# Patient Record
Sex: FEMALE | HISPANIC OR LATINO | Employment: OTHER | URBAN - METROPOLITAN AREA
[De-identification: names, ages, dates, MRNs, and addresses within clinical notes are randomized per-mention and may not be internally consistent; named-entity substitution may affect disease eponyms.]

---

## 2018-04-18 ENCOUNTER — APPOINTMENT (OUTPATIENT)
Dept: LAB | Facility: CLINIC | Age: 63
End: 2018-04-18
Payer: COMMERCIAL

## 2018-04-18 ENCOUNTER — OFFICE VISIT (OUTPATIENT)
Dept: DERMATOLOGY | Facility: CLINIC | Age: 63
End: 2018-04-18
Payer: COMMERCIAL

## 2018-04-18 ENCOUNTER — TRANSCRIBE ORDERS (OUTPATIENT)
Dept: LAB | Facility: CLINIC | Age: 63
End: 2018-04-18

## 2018-04-18 DIAGNOSIS — E13.8 DIABETES MELLITUS OF OTHER TYPE WITH COMPLICATION, UNSPECIFIED WHETHER LONG TERM INSULIN USE: ICD-10-CM

## 2018-04-18 DIAGNOSIS — L30.4 INTERTRIGO: ICD-10-CM

## 2018-04-18 DIAGNOSIS — R31.9 HEMATURIA, UNSPECIFIED TYPE: ICD-10-CM

## 2018-04-18 DIAGNOSIS — R31.9 HEMATURIA, UNSPECIFIED TYPE: Primary | ICD-10-CM

## 2018-04-18 DIAGNOSIS — L65.0 TELOGEN EFFLUVIUM: Primary | ICD-10-CM

## 2018-04-18 DIAGNOSIS — Z13.89 SCREENING FOR SKIN CONDITION: ICD-10-CM

## 2018-04-18 LAB
ALBUMIN SERPL BCP-MCNC: 3.1 G/DL (ref 3.5–5)
ALP SERPL-CCNC: 79 U/L (ref 46–116)
ALT SERPL W P-5'-P-CCNC: 21 U/L (ref 12–78)
ANION GAP SERPL CALCULATED.3IONS-SCNC: 8 MMOL/L (ref 4–13)
AST SERPL W P-5'-P-CCNC: 19 U/L (ref 5–45)
BACTERIA UR QL AUTO: ABNORMAL /HPF
BILIRUB SERPL-MCNC: 0.34 MG/DL (ref 0.2–1)
BILIRUB UR QL STRIP: NEGATIVE
BUN SERPL-MCNC: 12 MG/DL (ref 5–25)
CALCIUM SERPL-MCNC: 8.8 MG/DL (ref 8.3–10.1)
CHLORIDE SERPL-SCNC: 104 MMOL/L (ref 100–108)
CHOLEST SERPL-MCNC: 91 MG/DL (ref 50–200)
CLARITY UR: CLEAR
CO2 SERPL-SCNC: 29 MMOL/L (ref 21–32)
COLOR UR: ABNORMAL
CREAT SERPL-MCNC: 0.75 MG/DL (ref 0.6–1.3)
EST. AVERAGE GLUCOSE BLD GHB EST-MCNC: 137 MG/DL
GFR SERPL CREATININE-BSD FRML MDRD: 85 ML/MIN/1.73SQ M
GLUCOSE P FAST SERPL-MCNC: 109 MG/DL (ref 65–99)
GLUCOSE UR STRIP-MCNC: NEGATIVE MG/DL
HBA1C MFR BLD: 6.4 % (ref 4.2–6.3)
HDLC SERPL-MCNC: 35 MG/DL (ref 40–60)
HGB UR QL STRIP.AUTO: NEGATIVE
HYALINE CASTS #/AREA URNS LPF: ABNORMAL /LPF
KETONES UR STRIP-MCNC: NEGATIVE MG/DL
LDLC SERPL CALC-MCNC: 25 MG/DL (ref 0–100)
LEUKOCYTE ESTERASE UR QL STRIP: NEGATIVE
NITRITE UR QL STRIP: NEGATIVE
NON-SQ EPI CELLS URNS QL MICRO: ABNORMAL /HPF
NONHDLC SERPL-MCNC: 56 MG/DL
PH UR STRIP.AUTO: 6 [PH] (ref 4.5–8)
POTASSIUM SERPL-SCNC: 3.8 MMOL/L (ref 3.5–5.3)
PROT SERPL-MCNC: 7.8 G/DL (ref 6.4–8.2)
PROT UR STRIP-MCNC: NEGATIVE MG/DL
RBC #/AREA URNS AUTO: ABNORMAL /HPF
SODIUM SERPL-SCNC: 141 MMOL/L (ref 136–145)
SP GR UR STRIP.AUTO: 1.02 (ref 1–1.03)
TRIGL SERPL-MCNC: 156 MG/DL
UROBILINOGEN UR QL STRIP.AUTO: 1 E.U./DL
WBC #/AREA URNS AUTO: ABNORMAL /HPF

## 2018-04-18 PROCEDURE — 83036 HEMOGLOBIN GLYCOSYLATED A1C: CPT

## 2018-04-18 PROCEDURE — 80053 COMPREHEN METABOLIC PANEL: CPT

## 2018-04-18 PROCEDURE — 80061 LIPID PANEL: CPT

## 2018-04-18 PROCEDURE — 36415 COLL VENOUS BLD VENIPUNCTURE: CPT

## 2018-04-18 PROCEDURE — 99213 OFFICE O/P EST LOW 20 MIN: CPT | Performed by: DERMATOLOGY

## 2018-04-18 PROCEDURE — 81001 URINALYSIS AUTO W/SCOPE: CPT | Performed by: INTERNAL MEDICINE

## 2018-04-18 RX ORDER — METOPROLOL SUCCINATE 50 MG/1
TABLET, EXTENDED RELEASE ORAL
COMMUNITY
Start: 2017-12-05

## 2018-04-18 RX ORDER — CHLORAL HYDRATE 500 MG
CAPSULE ORAL
COMMUNITY

## 2018-04-18 RX ORDER — CLOPIDOGREL BISULFATE 75 MG/1
TABLET ORAL
COMMUNITY
Start: 2017-11-21

## 2018-04-18 RX ORDER — METHOCARBAMOL 500 MG/1
500 TABLET, FILM COATED ORAL EVERY 24 HOURS
COMMUNITY
Start: 2013-07-31 | End: 2018-05-07 | Stop reason: ALTCHOICE

## 2018-04-18 RX ORDER — UBIDECARENONE 75 MG
1 CAPSULE ORAL
COMMUNITY

## 2018-04-18 RX ORDER — NITROGLYCERIN 0.4 MG/1
1 TABLET SUBLINGUAL 3 TIMES DAILY
COMMUNITY

## 2018-04-18 RX ORDER — LISINOPRIL 10 MG/1
TABLET ORAL
COMMUNITY

## 2018-04-18 RX ORDER — OMEPRAZOLE 20 MG/1
CAPSULE, DELAYED RELEASE ORAL
COMMUNITY
End: 2019-01-17 | Stop reason: SDUPTHER

## 2018-04-18 RX ORDER — MONTELUKAST SODIUM 10 MG/1
10 TABLET ORAL
COMMUNITY
Start: 2017-12-04

## 2018-04-18 RX ORDER — CLOTRIMAZOLE 1 %
CREAM (GRAM) TOPICAL
COMMUNITY
Start: 2013-07-31 | End: 2019-01-17

## 2018-04-18 RX ORDER — ALBUTEROL SULFATE 90 UG/1
AEROSOL, METERED RESPIRATORY (INHALATION)
COMMUNITY
Start: 2018-02-16 | End: 2019-05-10 | Stop reason: SDUPTHER

## 2018-04-18 RX ORDER — ATORVASTATIN CALCIUM 80 MG/1
80 TABLET, FILM COATED ORAL
COMMUNITY
Start: 2013-07-31

## 2018-04-18 RX ORDER — ALPRAZOLAM 2 MG/1
TABLET ORAL
COMMUNITY

## 2018-04-18 NOTE — PROGRESS NOTES
[address] [facility] OF [address] [facility] DERMATOLOGY  [address] [place] [address]     MRN: [id number] : 1955  Encounter: [id number]  Patient Information: Ad Barnes  Chief complaint:Hair loss and concerns regarding a rash in the groin folds    History of present illness:  58-year-old female presents for concerns regarding some loss of hair loss over the last couple weeks patient notably had the flu in January was quite sick and was hospitalized with dehydration patient noted suddenly her hair loss over the past couple weeks also concerned regarding chronic irritation in the area of her abdominal scar  No past medical history on file  No past surgical history on file  Social History   History   Alcohol use Not on file     History   Drug use: Unknown     History   Smoking Status    Former Smoker   Smokeless Tobacco    Never Used     No family history on file  Meds/Allergies   No Known Allergies    Meds:  Prior to Admission medications    Medication Sig Start Date End Date Taking?  Authorizing Provider   albuterol (PROAIR HFA) 90 mcg/act inhaler USE 2 INHALATIONS FOUR TIMES A DAY 18  Yes Historical Provider, MD   ALPRAZolam Kyree Dome) 2 MG tablet 1mg -2mg 3 times daily prn   Yes Historical Provider, MD   aspirin 81 MG tablet Take by mouth   Yes Historical Provider, MD   atorvastatin (LIPITOR) 80 mg tablet Take 80 mg by mouth 13  Yes Historical Provider, MD   clopidogrel (PLAVIX) 75 mg tablet TAKE 1 TABLET DAILY 17  Yes Historical Provider, MD   clotrimazole (LOTRIMIN) 1 % cream apply to both adominal skin folds under belly twice a day 13  Yes Historical Provider, MD   cyanocobalamin (VITAMIN B-12) 100 mcg tablet Take 1 tablet by mouth   Yes Historical Provider, MD   lisinopril (ZESTRIL) 10 mg tablet Take by mouth   Yes Historical Provider, MD   methocarbamol (ROBAXIN) 500 mg tablet Take 500 mg by mouth every 24 hours 13  Yes Historical Provider, MD   metoprolol succinate (TOPROL-XL) 50 mg 24 hr tablet TAKE 1 TABLET DAILY 12/5/17  Yes Historical Provider, MD   montelukast (SINGULAIR) 10 mg tablet Take 10 mg by mouth 12/4/17  Yes Historical Provider, MD   nitroglycerin (NITROSTAT) 0 4 mg SL tablet Take 1 tablet by mouth Three times a day   Yes Historical Provider, MD   [place]-3 1000 MG CAPS Take by mouth   Yes Historical Provider, MD   omeprazole (PriLOSEC) 20 mg delayed release capsule Take by mouth   Yes Historical Provider, MD       Subjective:     Review of Systems:    General: negative for - chills, fatigue, fever,  weight gain or weight loss  Psychological: negative for - anxiety, behavioral disorder, concentration difficulties, decreased libido, depression, irritability, memory difficulties, mood swings, sleep disturbances or suicidal ideation  ENT: negative for - hearing difficulties , nasal congestion, nasal discharge, oral lesions, sinus pain, sneezing, sore throat  Allergy and Immunology: negative for - hives, insect bite sensitivity,  Hematological and Lymphatic: negative for - bleeding problems, blood clots,bruising, swollen lymph nodes  Endocrine: negative for - hair pattern changes, hot flashes, malaise/lethargy, mood swings, palpitations, polydipsia/polyuria, skin changes, temperature intolerance or unexpected weight change  Respiratory: negative for - cough, hemoptysis, orthopnea, shortness of breath, or wheezing  Cardiovascular: negative for - chest pain, dyspnea on exertion, edema,  Gastrointestinal: negative for - abdominal pain, nausea/vomiting  Genito-Urinary: negative for - dysuria, incontinence, irregular/heavy menses or urinary frequency/urgency  Musculoskeletal: negative for - gait disturbance, joint pain, joint stiffness, joint swelling, muscle pain, muscular weakness  Dermatological:  As in HPI  Neurological: negative for confusion, dizziness, headaches, impaired coordination/balance, memory loss, numbness/tingling, seizures, speech problems, tremors or weakness       Objective: There were no vitals taken for this visit  Physical Exam:    General Appearance:    Alert, cooperative, no distress   Head:    Normocephalic, without obvious abnormality, atraumatic           Skin:   A full skin exam was performed including scalp, head scalp, eyes, ears, nose, lips, neck, chest, axilla, abdomen, back, buttocks, bilateral upper extremities, bilateral lower extremities, hands, feet, fingers, toes, fingernails, and toenails  Hair pull some hairs not numerous normal pigmented lesions regular shape and color some inflammation erosion noted on the scar on her lower abdominal fold nothing else of concern noted     Assessment:     1  Telogen effluvium     2  Intertrigo     3  Screening for skin condition           Plan:    telogen effluvium we discussed the concept of this diagnosis hopefully this is a temporary hair loss which will resolve back to normal patient may have underlying pattern alopecia as well  Patient to call if this process continues after a few more months   intertrigo continues to be a problem advise the use of petrolatum to protect the area and weight loss would be helpful  Screening for Dermatologic Disorders: Nothing else of concern noted on complete exam follow up lambert Burgos MD  4/18/2018,11:32 AM    Portions of the record may have been created with voice recognition software   Occasional wrong word or "sound a like" substitutions may have occurred due to the inherent limitations of voice recognition software   Read the chart carefully and recognize, using context, where substitutions have occurred

## 2018-04-18 NOTE — PATIENT INSTRUCTIONS
telogen effluvium we discussed the concept of this diagnosis hopefully this is a temporary hair loss which will resolve back to normal patient may have underlying pattern alopecia as well    Patient to call if this process continues after a few more months   intertrigo continues to be a problem advise the use of petrolatum to protect the area and weight loss would be helpful  Screening for Dermatologic Disorders: Nothing else of concern noted on complete exam follow up prn

## 2018-04-19 ENCOUNTER — TRANSCRIBE ORDERS (OUTPATIENT)
Dept: MRI IMAGING | Facility: CLINIC | Age: 63
End: 2018-04-19

## 2018-04-19 DIAGNOSIS — S09.90XA TRAUMATIC INJURY OF HEAD, INITIAL ENCOUNTER: Primary | ICD-10-CM

## 2018-04-20 ENCOUNTER — HOSPITAL ENCOUNTER (OUTPATIENT)
Dept: CT IMAGING | Facility: CLINIC | Age: 63
Discharge: HOME | End: 2018-04-20
Payer: COMMERCIAL

## 2018-04-20 DIAGNOSIS — S09.90XA TRAUMATIC INJURY OF HEAD, INITIAL ENCOUNTER: ICD-10-CM

## 2018-04-20 PROCEDURE — 70450 CT HEAD/BRAIN W/O DYE: CPT

## 2018-05-07 ENCOUNTER — OFFICE VISIT (OUTPATIENT)
Dept: OBGYN CLINIC | Age: 63
End: 2018-05-07
Payer: COMMERCIAL

## 2018-05-07 VITALS
WEIGHT: 234 LBS | SYSTOLIC BLOOD PRESSURE: 152 MMHG | DIASTOLIC BLOOD PRESSURE: 72 MMHG | BODY MASS INDEX: 47.17 KG/M2 | HEIGHT: 59 IN

## 2018-05-07 DIAGNOSIS — R10.2 PELVIC PAIN: ICD-10-CM

## 2018-05-07 DIAGNOSIS — L30.4 INTERTRIGO: ICD-10-CM

## 2018-05-07 DIAGNOSIS — Z01.411 ENCOUNTER FOR GYNECOLOGICAL EXAMINATION WITH ABNORMAL FINDING: Primary | ICD-10-CM

## 2018-05-07 DIAGNOSIS — L30.9 DERMATITIS: ICD-10-CM

## 2018-05-07 DIAGNOSIS — Z12.31 ENCOUNTER FOR SCREENING MAMMOGRAM FOR MALIGNANT NEOPLASM OF BREAST: ICD-10-CM

## 2018-05-07 DIAGNOSIS — R31.9 HEMATURIA, UNSPECIFIED TYPE: ICD-10-CM

## 2018-05-07 PROCEDURE — G0101 CA SCREEN;PELVIC/BREAST EXAM: HCPCS | Performed by: NURSE PRACTITIONER

## 2018-05-07 PROCEDURE — G0145 SCR C/V CYTO,THINLAYER,RESCR: HCPCS | Performed by: NURSE PRACTITIONER

## 2018-05-07 RX ORDER — ZOLPIDEM TARTRATE 10 MG/1
10 TABLET ORAL DAILY
COMMUNITY
Start: 2013-07-31

## 2018-05-07 RX ORDER — CLOTRIMAZOLE AND BETAMETHASONE DIPROPIONATE 10; .64 MG/G; MG/G
CREAM TOPICAL 2 TIMES DAILY
Qty: 45 G | Refills: 1 | Status: SHIPPED | OUTPATIENT
Start: 2018-05-07 | End: 2019-01-17

## 2018-05-07 RX ORDER — TRAMADOL HYDROCHLORIDE 50 MG/1
50 TABLET ORAL
COMMUNITY
Start: 2013-07-31

## 2018-05-07 NOTE — PROGRESS NOTES
Assessment/Plan:    No problem-specific Assessment & Plan notes found for this encounter  Diagnoses and all orders for this visit:    Encounter for gynecological examination with abnormal finding  -     Liquid-based pap, screening    Pelvic pain  -     US pelvis complete w transvaginal; Future  -     conjugated estrogens (PREMARIN) vaginal cream; Insert 0 5 g into the vagina 2 (two) times a week for 30 days    Intertrigo  -     clotrimazole-betamethasone (LOTRISONE) 1-0 05 % cream; Apply topically 2 (two) times a day    Hematuria, unspecified type  -     Ambulatory referral to Urology    Dermatitis    Encounter for screening mammogram for malignant neoplasm of breast  -     Mammo screening bilateral w 3d & cad; Future    Other orders  -     traMADol (ULTRAM) 50 mg tablet; Take 50 mg by mouth  -     zolpidem (AMBIEN) 10 mg tablet; Take 10 mg by mouth daily        Call as needed, encouraged calcium/vit D in her diet, all questions answered  Return in 6 weeks for re-evaluation and possible vulvar bx if no symptom relief  Subjective:      Patient ID: Jerrod Castillo is a 57 y o  female  Pleasant 57 y o  postmenopausal female here for annual exam  She denies postmenopausal bleeding, Hysterectomy/oopherectomy for ovarian cysts in 1980s  + history of abnormal pap smears, last one abnml in 2017  +vaginal issues with itching and discomfort on her vulva  + pelvic pain  Denies menopausal/postmenopausal issues  Colonoscopy 2017  The following portions of the patient's history were reviewed and updated as appropriate:   She  has a past medical history of Arthritis ( ); Diabetes ([address] ); and High blood pressure    She   Patient Active Problem List    Diagnosis Date Noted    Encounter for gynecological examination with abnormal finding 05/07/2018    Dermatitis 05/07/2018    Hematuria 05/07/2018    Pelvic pain 05/07/2018    Telogen effluvium 04/18/2018    Intertrigo 04/18/2018    Screening for skin condition 04/18/2018     She  has a past surgical history that includes Other surgical history; Coronary stent placement; Gallbladder surgery; Hysterectomy; and Knee surgery (Right)  Her family history includes Breast cancer in her maternal aunt, maternal aunt, mother, and paternal aunt; Cervical cancer in her maternal aunt; Colon cancer in her father; Ovarian cancer in her maternal aunt; Uterine cancer in her maternal aunt  She  reports that she has quit smoking  She has never used smokeless tobacco  She reports that she does not drink alcohol or use drugs  Current Outpatient Prescriptions   Medication Sig Dispense Refill    albuterol (PROAIR HFA) 90 mcg/act inhaler USE 2 INHALATIONS FOUR TIMES A DAY      ALPRAZolam (XANAX) 2 MG tablet 1mg -2mg 3 times daily prn      aspirin 81 MG tablet Take by mouth      atorvastatin (LIPITOR) 80 mg tablet Take 80 mg by mouth      clopidogrel (PLAVIX) 75 mg tablet TAKE 1 TABLET DAILY      cyanocobalamin (VITAMIN B-12) 100 mcg tablet Take 1 tablet by mouth      lisinopril (ZESTRIL) 10 mg tablet Take by mouth      metoprolol succinate (TOPROL-XL) 50 mg 24 hr tablet TAKE 1 TABLET DAILY      montelukast (SINGULAIR) 10 mg tablet Take 10 mg by mouth      nitroglycerin (NITROSTAT) 0 4 mg SL tablet Take 1 tablet by mouth Three times a day      [place]-3 1000 MG CAPS Take by mouth      omeprazole (PriLOSEC) 20 mg delayed release capsule Take by mouth      zolpidem (AMBIEN) 10 mg tablet Take 10 mg by mouth daily      clotrimazole (LOTRIMIN) 1 % cream apply to both adominal skin folds under belly twice a day      clotrimazole-betamethasone (LOTRISONE) 1-0 05 % cream Apply topically 2 (two) times a day 45 g 1    conjugated estrogens (PREMARIN) vaginal cream Insert 0 5 g into the vagina 2 (two) times a week for 30 days 42 5 g 0    traMADol (ULTRAM) 50 mg tablet Take 50 mg by mouth       No current facility-administered medications for this visit        She is allergic to wound dressing adhesive  OB History    Para Term  AB Living   2 2 2     2   SAB TAB Ectopic Multiple Live Births           2      # Outcome Date GA Lbr Simeon/2nd Weight Sex Delivery Anes PTL Lv   2 Term            1 Term                 Grown daughter and son  Review of Systems   Constitutional: Negative for chills, fatigue, fever and unexpected weight change  Respiratory: Negative for shortness of breath  Gastrointestinal: Negative for anal bleeding, blood in stool, constipation and diarrhea  Genitourinary: Negative for difficulty urinating, dysuria and hematuria  Objective:      /72 (BP Location: Right arm, Patient Position: Sitting)   Ht 4' 11" (1 499 m)   Wt 106 kg (234 lb)   Breastfeeding? No   BMI 47 26 kg/m²          Physical Exam   Constitutional: She appears well-developed and well-nourished  No distress  HENT:   Head: Normocephalic  Neck: Normal range of motion  Neck supple  Pulmonary/Chest: Effort normal  Right breast exhibits no inverted nipple, no mass, no nipple discharge, no skin change and no tenderness  Left breast exhibits no inverted nipple, no mass, no nipple discharge, no skin change and no tenderness  Breasts are symmetrical    Abdominal: Soft  Genitourinary: No breast swelling, tenderness, discharge or bleeding  Pelvic exam was performed with patient supine  There is labial fusion  There is no rash, tenderness, lesion or injury on the right labia  There is no rash, tenderness, lesion or injury on the left labia  Right adnexum displays no mass, no tenderness and no fullness  Left adnexum displays no mass, no tenderness and no fullness  No erythema or tenderness in the vagina  No foreign body in the vagina  No signs of injury around the vagina  No vaginal discharge found  Genitourinary Comments: Cx/uterus absent, +leukoplakia with fusion noted bilaterally  Erythema/atrophy noted to vagina  Pannus with irritation midabdomen  Also pruritic as well  Lymphadenopathy:        Right: No inguinal adenopathy present  Left: No inguinal adenopathy present

## 2018-05-07 NOTE — PATIENT INSTRUCTIONS
Dermatitis   AMBULATORY CARE:   Dermatitis  is skin inflammation  You may have an itchy rash, redness, or swelling  You may also have bumps or blisters that crust over or ooze clear fluid  Call 911 if you have any of the following symptoms of anaphylaxis:   · Sudden trouble breathing    · Throat swelling and tightness    · Dizziness, lightheadedness, fainting, or confusion  Seek care immediately if:   · You develop a fever or have red streaks going up your arm or leg  · Your rash gets more swollen, red, or hot  Contact your healthcare provider if:   · Your skin blisters, oozes white or yellow pus, or has a foul-smelling discharge  · Your rash spreads or does not get better, even after treatment  · You have questions or concerns about your condition or care  Treatment for dermatitis  depends on the cause of your rash  You may need medicines to help decrease itching and inflammation or treat a bacterial infection  They may be given as a topical cream, shot, or a pill  Manage dermatitis:   · Apply a cool compress to your rash  This will help soothe your skin  · Keep your skin moist   Rub unscented cream or lotion on your skin to prevent dryness and itching  Do this right after a lukewarm bath or shower when your skin is still damp  · Avoid skin irritants  Do not use skin irritants, such as makeup, hair products, soaps, and cleansers  Use products that do not contain fragrances or dye  Follow up with your healthcare provider as directed:  Write down your questions so you remember to ask them during your visits  © 2017 [employer] Information is for End User's use only and may not be sold, redistributed or otherwise used for commercial purposes  All illustrations and images included in CareNotes® are the copyrighted property of [employer] , [employer]  or [employer]  The above information is an  only   It is not intended as medical advice for individual conditions or treatments  Talk to your doctor, nurse or pharmacist before following any medical regimen to see if it is safe and effective for you

## 2018-05-08 ENCOUNTER — TELEPHONE (OUTPATIENT)
Dept: OBGYN CLINIC | Facility: CLINIC | Age: 63
End: 2018-05-08

## 2018-05-09 DIAGNOSIS — R10.2 PELVIC PAIN: Primary | ICD-10-CM

## 2018-05-10 LAB
LAB AP GYN PRIMARY INTERPRETATION: NORMAL
Lab: NORMAL

## 2018-05-15 ENCOUNTER — OFFICE VISIT (OUTPATIENT)
Dept: UROLOGY | Facility: CLINIC | Age: 63
End: 2018-05-15
Payer: COMMERCIAL

## 2018-05-15 VITALS
HEIGHT: 59 IN | WEIGHT: 237 LBS | BODY MASS INDEX: 47.78 KG/M2 | SYSTOLIC BLOOD PRESSURE: 138 MMHG | HEART RATE: 82 BPM | DIASTOLIC BLOOD PRESSURE: 80 MMHG

## 2018-05-15 DIAGNOSIS — R31.21 ASYMPTOMATIC MICROSCOPIC HEMATURIA: ICD-10-CM

## 2018-05-15 DIAGNOSIS — R31.29 MICROHEMATURIA: Primary | ICD-10-CM

## 2018-05-15 LAB
SL AMB  POCT GLUCOSE, UA: NORMAL
SL AMB LEUKOCYTE ESTERASE,UA: NORMAL
SL AMB POCT BILIRUBIN,UA: NORMAL
SL AMB POCT BLOOD,UA: NORMAL
SL AMB POCT CLARITY,UA: CLEAR
SL AMB POCT COLOR,UA: YELLOW
SL AMB POCT KETONES,UA: NORMAL
SL AMB POCT NITRITE,UA: NORMAL
SL AMB POCT PH,UA: 6
SL AMB POCT SPECIFIC GRAVITY,UA: 1.01
SL AMB POCT URINE PROTEIN: NORMAL
SL AMB POCT UROBILINOGEN: NORMAL

## 2018-05-15 PROCEDURE — 81002 URINALYSIS NONAUTO W/O SCOPE: CPT | Performed by: UROLOGY

## 2018-05-15 PROCEDURE — 99203 OFFICE O/P NEW LOW 30 MIN: CPT | Performed by: UROLOGY

## 2018-05-15 NOTE — LETTER
May 15, 2018     [address] [place] [address]  [address] [facility]    Patient: Amy Wagner   YOB: 1955   Date of Visit: 5/15/2018       Dear Dr Chel Huber: Thank you for referring Amy Wagner to me for evaluation  Below are my notes for this consultation  If you have questions, please do not hesitate to call me  I look forward to following your patient along with you  Sincerely,        Archana Donovan MD        CC: No Recipients  Archana Donovan MD  5/15/2018  2:00 PM  Sign at close encounter  Referring Physician: Rafael Akins MD  A copy of this note was sent to the referring physician  Diagnoses and all orders for this visit:    Microhematuria  -     POCT urine dip    Asymptomatic microscopic hematuria            Assessment and plan: This is a 64-year-old female with a reported history of microscopic hematuria  I reviewed review with the patient the results of her recent urinalysis with microscopy which reveals 0 red blood cells per high-powered field  In addition urine dipstick in the office today was negative  As such discussed that no further urologic evaluation is necessary  Patient was encouraged to follow-up with her gynecologist for her atrophic vaginitis    Archana Donovan MD      Chief Complaint     Hematuria      History of Present Illness     Amy Wagner is a 57 y o  female referred for evaluation of possible microscopic hematuria  Patient reports a history of chronic constipation  She notes that often when she strains to defecate he sees blood with her stool  She denies any gross hematuria  She recently had a microscopic urinalysis obtained during a gynecologic evaluation  This was negative for any hematuria  Detailed Urologic History     - please refer to HPI    Review of Systems     Review of Systems   Constitutional: Positive for activity change  Negative for fatigue     HENT: Negative for congestion  Eyes: Negative for visual disturbance  Respiratory: Negative for shortness of breath and wheezing  Cardiovascular: Negative for chest pain and leg swelling  Gastrointestinal: Negative for abdominal pain  Endocrine: Negative for polyuria  Genitourinary: Negative for dysuria, flank pain, hematuria and urgency  Musculoskeletal: Positive for back pain  Allergic/Immunologic: Negative for immunocompromised state  Neurological: Negative for dizziness and numbness  Psychiatric/Behavioral: Negative for dysphoric mood  All other systems reviewed and are negative  Allergies     Allergies   Allergen Reactions    Wound Dressing Adhesive      Tape        Physical Exam     Physical Exam   Constitutional: She is oriented to person, place, and time  She appears well-developed  HENT:   Head: Normocephalic and atraumatic  Eyes: Pupils are equal, round, and reactive to light  Neck: Normal range of motion  Cardiovascular:   Negative lower extremity edema   Pulmonary/Chest: Effort normal and breath sounds normal    Abdominal: Soft  Genitourinary:   Genitourinary Comments: Negative suprapubic tenderness   Musculoskeletal: Normal range of motion  Neurological: She is alert and oriented to person, place, and time  Skin: Skin is warm  Psychiatric: She has a normal mood and affect             Vital Signs  Vitals:    05/15/18 1311   BP: 138/80   Pulse: 82   Weight: 108 kg (237 lb)   Height: 4' 11" (1 499 m)         Current Medications       Current Outpatient Prescriptions:     albuterol (PROAIR HFA) 90 mcg/act inhaler, USE 2 INHALATIONS FOUR TIMES A DAY, Disp: , Rfl:     ALPRAZolam (XANAX) 2 MG tablet, 1mg -2mg 3 times daily prn, Disp: , Rfl:     aspirin 81 MG tablet, Take by mouth, Disp: , Rfl:     atorvastatin (LIPITOR) 80 mg tablet, Take 80 mg by mouth, Disp: , Rfl:     clopidogrel (PLAVIX) 75 mg tablet, TAKE 1 TABLET DAILY, Disp: , Rfl:     cyanocobalamin (VITAMIN B-12) 100 mcg tablet, Take 1 tablet by mouth, Disp: , Rfl:     lisinopril (ZESTRIL) 10 mg tablet, Take by mouth, Disp: , Rfl:     metoprolol succinate (TOPROL-XL) 50 mg 24 hr tablet, TAKE 1 TABLET DAILY, Disp: , Rfl:     montelukast (SINGULAIR) 10 mg tablet, Take 10 mg by mouth, Disp: , Rfl:     nitroglycerin (NITROSTAT) 0 4 mg SL tablet, Take 1 tablet by mouth Three times a day, Disp: , Rfl:     Omega-3 1000 MG CAPS, Take by mouth, Disp: , Rfl:     traMADol (ULTRAM) 50 mg tablet, Take 50 mg by mouth, Disp: , Rfl:     zolpidem (AMBIEN) 10 mg tablet, Take 10 mg by mouth daily, Disp: , Rfl:     clotrimazole (LOTRIMIN) 1 % cream, apply to both adominal skin folds under belly twice a day, Disp: , Rfl:     clotrimazole-betamethasone (LOTRISONE) 1-0 05 % cream, Apply topically 2 (two) times a day, Disp: 45 g, Rfl: 1    conjugated estrogens (PREMARIN) vaginal cream, Insert 0 5 g into the vagina 2 (two) times a week for 30 days, Disp: 42 5 g, Rfl: 0    omeprazole (PriLOSEC) 20 mg delayed release capsule, Take by mouth, Disp: , Rfl:       Active Problems     Patient Active Problem List   Diagnosis    Telogen effluvium    Intertrigo    Screening for skin condition    Encounter for gynecological examination with abnormal finding    Dermatitis    Hematuria    Pelvic pain         Past Medical History     Past Medical History:   Diagnosis Date    Angina at rest [facility])     Arthritis       Back problem     Diabetes ([facility] [address] )     Gall stones     Heart attack ([facility] [address] )     High blood pressure     Stomach problems          Surgical History     Past Surgical History:   Procedure Laterality Date    CORONARY STENT PLACEMENT      x3    GALLBLADDER SURGERY      HYSTERECTOMY      KNEE SURGERY Right     OTHER SURGICAL HISTORY      Double Bypass          Family History     Family History   Problem Relation Age of Onset    Breast cancer Mother     Colon cancer Father     Breast cancer Maternal Aunt     Ovarian cancer Maternal Aunt     Breast cancer Maternal Aunt     Cervical cancer Maternal Aunt     Uterine cancer Maternal Aunt     Breast cancer Paternal Aunt          Social History     Social History     History   Smoking Status    Former Smoker   Smokeless Tobacco    Never Used         Pertinent Lab Values     Lab Results   Component Value Date    CREATININE 0 75 04/18/2018       No results found for: PSA    @RESULTRCNT(1H])@      Pertinent Imaging      - n/a    Portions of the record may have been created with voice recognition software   Occasional wrong word or "sound a like" substitutions may have occurred due to the inherent limitations of voice recognition software   Read the chart carefully and recognize, using context, where substitutions have occurred

## 2018-05-15 NOTE — PROGRESS NOTES
Referring Physician: Milton Adams MD  A copy of this note was sent to the referring physician  Diagnoses and all orders for this visit:    Microhematuria  -     POCT urine dip    Asymptomatic microscopic hematuria            Assessment and plan: This is a 12-year-old female with a reported history of microscopic hematuria  I reviewed review with the patient the results of her recent urinalysis with microscopy which reveals 0 red blood cells per high-powered field  In addition urine dipstick in the office today was negative  As such discussed that no further urologic evaluation is necessary  Patient was encouraged to follow-up with her gynecologist for her atrophic vaginitis    Victor Hugo Guzman MD      Chief Complaint     Hematuria      History of Present Illness     Tacho Tristan is a 57 y o  female referred for evaluation of possible microscopic hematuria  Patient reports a history of chronic constipation  She notes that often when she strains to defecate he sees blood with her stool  She denies any gross hematuria  She recently had a microscopic urinalysis obtained during a gynecologic evaluation  This was negative for any hematuria  Detailed Urologic History     - please refer to HPI    Review of Systems     Review of Systems   Constitutional: Positive for activity change  Negative for fatigue  HENT: Negative for congestion  Eyes: Negative for visual disturbance  Respiratory: Negative for shortness of breath and wheezing  Cardiovascular: Negative for chest pain and leg swelling  Gastrointestinal: Negative for abdominal pain  Endocrine: Negative for polyuria  Genitourinary: Negative for dysuria, flank pain, hematuria and urgency  Musculoskeletal: Positive for back pain  Allergic/Immunologic: Negative for immunocompromised state  Neurological: Negative for dizziness and numbness  Psychiatric/Behavioral: Negative for dysphoric mood     All other systems reviewed and are negative  Allergies     Allergies   Allergen Reactions    Wound Dressing Adhesive      Tape        Physical Exam     Physical Exam   Constitutional: She is oriented to person, place, and time  She appears well-developed  HENT:   Head: Normocephalic and atraumatic  Eyes: Pupils are equal, round, and reactive to light  Neck: Normal range of motion  Cardiovascular:   Negative lower extremity edema   Pulmonary/Chest: Effort normal and breath sounds normal    Abdominal: Soft  Genitourinary:   Genitourinary Comments: Negative suprapubic tenderness   Musculoskeletal: Normal range of motion  Neurological: She is alert and oriented to person, place, and time  Skin: Skin is warm  Psychiatric: She has a normal mood and affect             Vital Signs  Vitals:    05/15/18 1311   BP: 138/80   Pulse: 82   Weight: 108 kg (237 lb)   Height: 4' 11" (1 499 m)         Current Medications       Current Outpatient Prescriptions:     albuterol (PROAIR HFA) 90 mcg/act inhaler, USE 2 INHALATIONS FOUR TIMES A DAY, Disp: , Rfl:     ALPRAZolam (XANAX) 2 MG tablet, 1mg -2mg 3 times daily prn, Disp: , Rfl:     aspirin 81 MG tablet, Take by mouth, Disp: , Rfl:     atorvastatin (LIPITOR) 80 mg tablet, Take 80 mg by mouth, Disp: , Rfl:     clopidogrel (PLAVIX) 75 mg tablet, TAKE 1 TABLET DAILY, Disp: , Rfl:     cyanocobalamin (VITAMIN B-12) 100 mcg tablet, Take 1 tablet by mouth, Disp: , Rfl:     lisinopril (ZESTRIL) 10 mg tablet, Take by mouth, Disp: , Rfl:     metoprolol succinate (TOPROL-XL) 50 mg 24 hr tablet, TAKE 1 TABLET DAILY, Disp: , Rfl:     montelukast (SINGULAIR) 10 mg tablet, Take 10 mg by mouth, Disp: , Rfl:     nitroglycerin (NITROSTAT) 0 4 mg SL tablet, Take 1 tablet by mouth Three times a day, Disp: , Rfl:     Omega-3 1000 MG CAPS, Take by mouth, Disp: , Rfl:     traMADol (ULTRAM) 50 mg tablet, Take 50 mg by mouth, Disp: , Rfl:     zolpidem (AMBIEN) 10 mg tablet, Take 10 mg by mouth daily, Disp: , Rfl:     clotrimazole (LOTRIMIN) 1 % cream, apply to both adominal skin folds under belly twice a day, Disp: , Rfl:     clotrimazole-betamethasone (LOTRISONE) 1-0 05 % cream, Apply topically 2 (two) times a day, Disp: 45 g, Rfl: 1    conjugated estrogens (PREMARIN) vaginal cream, Insert 0 5 g into the vagina 2 (two) times a week for 30 days, Disp: 42 5 g, Rfl: 0    omeprazole (PriLOSEC) 20 mg delayed release capsule, Take by mouth, Disp: , Rfl:       Active Problems     Patient Active Problem List   Diagnosis    Telogen effluvium    Intertrigo    Screening for skin condition    Encounter for gynecological examination with abnormal finding    Dermatitis    Hematuria    Pelvic pain         Past Medical History     Past Medical History:   Diagnosis Date    [facility])     Arthritis       Back problem     [facility]     Gall stones     [facility]     High blood pressure     Stomach problems          Surgical History     Past Surgical History:   Procedure Laterality Date    CORONARY STENT PLACEMENT      x3    GALLBLADDER SURGERY      HYSTERECTOMY      KNEE SURGERY Right     OTHER SURGICAL HISTORY      Double Bypass          Family History     Family History   Problem Relation Age of Onset   Susy Mayes Breast cancer Mother     Colon cancer Father     Breast cancer Maternal Aunt     Ovarian cancer Maternal Aunt     Breast cancer Maternal Aunt     Cervical cancer Maternal Aunt     Uterine cancer Maternal Aunt     Breast cancer Paternal Aunt          Social History     Social History     History   Smoking Status    Former Smoker   Smokeless Tobacco    Never Used         Pertinent Lab Values     Lab Results   Component Value Date    CREATININE 0 75 04/18/2018       No results found for: PSA    @RESULTRCNT(1H])@      Pertinent Imaging      - n/a    Portions of the record may have been created with voice recognition software   Occasional wrong word or "sound a like" substitutions may have occurred due to the inherent limitations of voice recognition software   Read the chart carefully and recognize, using context, where substitutions have occurred

## 2018-06-18 ENCOUNTER — TELEPHONE (OUTPATIENT)
Dept: OBGYN CLINIC | Age: 63
End: 2018-06-18

## 2018-06-18 NOTE — TELEPHONE ENCOUNTER
Pt was scheduled to see you this morning for a f/u medication (which she never picked up) + test results  Pt would like to know if you can call her regarding this  Please advise

## 2018-06-18 NOTE — TELEPHONE ENCOUNTER
Spoke to pt  Vaginal itching is "once in a blue moon"  She will use lotrisone if it returns bc she could not afford the vajinal premarin  Has appt for pelvic u/s in July to evaluate her remaining ovary (hx of partial hysterectomy)  No further concerns  Will return after her appts to follow up with me

## 2018-07-27 ENCOUNTER — HOSPITAL ENCOUNTER (OUTPATIENT)
Dept: MAMMOGRAPHY | Facility: CLINIC | Age: 63
Discharge: HOME | End: 2018-07-27
Payer: COMMERCIAL

## 2018-07-27 ENCOUNTER — HOSPITAL ENCOUNTER (OUTPATIENT)
Dept: ULTRASOUND IMAGING | Facility: HOSPITAL | Age: 63
Discharge: HOME | End: 2018-07-27
Payer: COMMERCIAL

## 2018-07-27 DIAGNOSIS — Z12.31 ENCOUNTER FOR SCREENING MAMMOGRAM FOR MALIGNANT NEOPLASM OF BREAST: ICD-10-CM

## 2018-07-27 DIAGNOSIS — R10.2 PELVIC PAIN: ICD-10-CM

## 2018-07-27 PROCEDURE — 76830 TRANSVAGINAL US NON-OB: CPT

## 2018-07-27 PROCEDURE — 76856 US EXAM PELVIC COMPLETE: CPT

## 2018-07-27 PROCEDURE — 77067 SCR MAMMO BI INCL CAD: CPT

## 2018-07-27 PROCEDURE — 77063 BREAST TOMOSYNTHESIS BI: CPT

## 2018-07-31 ENCOUNTER — TELEPHONE (OUTPATIENT)
Dept: OBGYN CLINIC | Facility: CLINIC | Age: 63
End: 2018-07-31

## 2018-07-31 NOTE — TELEPHONE ENCOUNTER
----- Message from Huseyin Ramos, [address] sent at 7/30/2018 10:16 AM EDT -----  Pls advise pt that her ovaries were not seen which is not uncommon in a postmenopausal women bc they can shrink but no MASSES were seen so I don't feel any follow up imaging is necessary  Thanks

## 2018-11-29 ENCOUNTER — APPOINTMENT (OUTPATIENT)
Dept: LAB | Facility: CLINIC | Age: 63
End: 2018-11-29
Payer: COMMERCIAL

## 2018-11-29 ENCOUNTER — TRANSCRIBE ORDERS (OUTPATIENT)
Dept: LAB | Facility: CLINIC | Age: 63
End: 2018-11-29

## 2018-11-29 DIAGNOSIS — E13.8 DIABETES MELLITUS OF OTHER TYPE WITH COMPLICATION, UNSPECIFIED WHETHER LONG TERM INSULIN USE: Primary | ICD-10-CM

## 2018-11-29 DIAGNOSIS — E13.8 DIABETES MELLITUS OF OTHER TYPE WITH COMPLICATION, UNSPECIFIED WHETHER LONG TERM INSULIN USE: ICD-10-CM

## 2018-11-29 LAB
ALBUMIN SERPL BCP-MCNC: 3.4 G/DL (ref 3.5–5)
ALP SERPL-CCNC: 88 U/L (ref 46–116)
ALT SERPL W P-5'-P-CCNC: 24 U/L (ref 12–78)
ANION GAP SERPL CALCULATED.3IONS-SCNC: 3 MMOL/L (ref 4–13)
AST SERPL W P-5'-P-CCNC: 18 U/L (ref 5–45)
BILIRUB SERPL-MCNC: 0.37 MG/DL (ref 0.2–1)
BUN SERPL-MCNC: 14 MG/DL (ref 5–25)
CALCIUM SERPL-MCNC: 9.9 MG/DL (ref 8.3–10.1)
CHLORIDE SERPL-SCNC: 101 MMOL/L (ref 100–108)
CHOLEST SERPL-MCNC: 118 MG/DL (ref 50–200)
CO2 SERPL-SCNC: 31 MMOL/L (ref 21–32)
CREAT SERPL-MCNC: 0.84 MG/DL (ref 0.6–1.3)
EST. AVERAGE GLUCOSE BLD GHB EST-MCNC: 160 MG/DL
GFR SERPL CREATININE-BSD FRML MDRD: 73 ML/MIN/1.73SQ M
GLUCOSE P FAST SERPL-MCNC: 110 MG/DL (ref 65–99)
HBA1C MFR BLD: 7.2 % (ref 4.2–6.3)
HDLC SERPL-MCNC: 54 MG/DL (ref 40–60)
LDLC SERPL CALC-MCNC: 27 MG/DL (ref 0–100)
NONHDLC SERPL-MCNC: 64 MG/DL
POTASSIUM SERPL-SCNC: 3.6 MMOL/L (ref 3.5–5.3)
PROT SERPL-MCNC: 7.7 G/DL (ref 6.4–8.2)
SODIUM SERPL-SCNC: 135 MMOL/L (ref 136–145)
TRIGL SERPL-MCNC: 183 MG/DL

## 2018-11-29 PROCEDURE — 80053 COMPREHEN METABOLIC PANEL: CPT

## 2018-11-29 PROCEDURE — 80061 LIPID PANEL: CPT

## 2018-11-29 PROCEDURE — 36415 COLL VENOUS BLD VENIPUNCTURE: CPT

## 2018-11-29 PROCEDURE — 83036 HEMOGLOBIN GLYCOSYLATED A1C: CPT

## 2019-01-15 ENCOUNTER — TELEPHONE (OUTPATIENT)
Dept: GASTROENTEROLOGY | Facility: CLINIC | Age: 64
End: 2019-01-15

## 2019-01-16 RX ORDER — METHOCARBAMOL 500 MG/1
500 TABLET, FILM COATED ORAL
COMMUNITY
Start: 2013-07-31

## 2019-01-16 RX ORDER — ALBUTEROL SULFATE 90 UG/1
AEROSOL, METERED RESPIRATORY (INHALATION)
COMMUNITY
Start: 2018-11-13

## 2019-01-16 RX ORDER — PNEUMOCOCCAL VACCINE POLYVALENT 25; 25; 25; 25; 25; 25; 25; 25; 25; 25; 25; 25; 25; 25; 25; 25; 25; 25; 25; 25; 25; 25; 25 UG/.5ML; UG/.5ML; UG/.5ML; UG/.5ML; UG/.5ML; UG/.5ML; UG/.5ML; UG/.5ML; UG/.5ML; UG/.5ML; UG/.5ML; UG/.5ML; UG/.5ML; UG/.5ML; UG/.5ML; UG/.5ML; UG/.5ML; UG/.5ML; UG/.5ML; UG/.5ML; UG/.5ML; UG/.5ML; UG/.5ML
INJECTION, SOLUTION INTRAMUSCULAR; SUBCUTANEOUS
Refills: 0 | COMMUNITY
Start: 2018-11-05

## 2019-01-17 ENCOUNTER — OFFICE VISIT (OUTPATIENT)
Dept: GASTROENTEROLOGY | Facility: CLINIC | Age: 64
End: 2019-01-17
Payer: COMMERCIAL

## 2019-01-17 VITALS
BODY MASS INDEX: 49.28 KG/M2 | HEART RATE: 74 BPM | DIASTOLIC BLOOD PRESSURE: 82 MMHG | WEIGHT: 244 LBS | SYSTOLIC BLOOD PRESSURE: 144 MMHG

## 2019-01-17 DIAGNOSIS — K62.5 RECTAL BLEEDING: Primary | ICD-10-CM

## 2019-01-17 DIAGNOSIS — R10.33 PERIUMBILICAL ABDOMINAL PAIN: ICD-10-CM

## 2019-01-17 DIAGNOSIS — R10.13 EPIGASTRIC PAIN: ICD-10-CM

## 2019-01-17 PROCEDURE — 99204 OFFICE O/P NEW MOD 45 MIN: CPT | Performed by: INTERNAL MEDICINE

## 2019-01-17 RX ORDER — OMEPRAZOLE 20 MG/1
20 CAPSULE, DELAYED RELEASE ORAL DAILY
Qty: 30 CAPSULE | Refills: 3 | Status: SHIPPED | OUTPATIENT
Start: 2019-01-17

## 2019-01-17 NOTE — PROGRESS NOTES
[facility] - Outpatient Consultation  Isiah Otto 70 y o  female MRN: [id number]  Encounter: [id number]          ASSESSMENT AND PLAN:      1  Periumbilical/epigastric abdominal pain  - Will plan EGD at this time to rule out PUD, gastritis, esophagitis  - Will restart PPI QD  2  Rectal bleeding  - Will do colonoscopy  - Recommend high fiber    ______________________________________________________________________    HPI:   70year old female who is here with abdominal pain and rectal bleeding for 6 months  She reports blood is bright red in color  She reports her stools are variable and she does have to strain at times, at other times she is having diarrhea  She admits to periumbilical/epigastric abdominal pain and bloating  She reports that certain foods aggravate her pain  Nausea but no vomiting  She has been off of her PPI for some time now  Her last EGD and colonoscopy were several years ago  She has a history of diverticulosis, colon polyps, and IH  REVIEW OF SYSTEMS:    CONSTITUTIONAL: Denies any fever, chills, rigors, and weight loss  HEENT: No earache or tinnitus  Denies hearing loss or visual disturbances  CARDIOVASCULAR: No chest pain or palpitations  RESPIRATORY: Denies any cough, hemoptysis, shortness of breath or dyspnea on exertion  GASTROINTESTINAL: As noted in the History of Present Illness  GENITOURINARY: No problems with urination  Denies any hematuria or dysuria  NEUROLOGIC: No dizziness or vertigo, denies headaches  MUSCULOSKELETAL: Denies any muscle or joint pain  SKIN: Denies skin rashes or itching  ENDOCRINE: Denies excessive thirst  Denies intolerance to heat or cold  PSYCHOSOCIAL: Denies depression or anxiety  Denies any recent memory loss  Historical Information   Past Medical History:   Diagnosis Date    [facility])     Arthritis       Back problem     [facility]     Gall stones     Heart attack ([facility] )  High blood pressure     Stomach problems      Past Surgical History:   Procedure Laterality Date    CORONARY STENT PLACEMENT      x3    GALLBLADDER SURGERY      HYSTERECTOMY      KNEE SURGERY Right     OTHER SURGICAL HISTORY      Double Bypass      Social History   History   Alcohol Use No     History   Drug Use No     History   Smoking Status    Former Smoker   Smokeless Tobacco    Never Used     Family History   Problem Relation Age of Onset    Breast cancer Mother     Colon cancer Father     Breast cancer Maternal Aunt     Ovarian cancer Maternal Aunt     Breast cancer Maternal Aunt     Cervical cancer Maternal Aunt     Uterine cancer Maternal Aunt     Breast cancer Paternal Aunt        Meds/Allergies       Current Outpatient Prescriptions:     albuterol (PROAIR HFA) 90 mcg/act inhaler    ALPRAZolam (XANAX) 2 MG tablet    aspirin 81 MG tablet    atorvastatin (LIPITOR) 80 mg tablet    clopidogrel (PLAVIX) 75 mg tablet    clotrimazole (LOTRIMIN) 1 % cream    cyanocobalamin (VITAMIN B-12) 100 mcg tablet    lisinopril (ZESTRIL) 10 mg tablet    methocarbamol (ROBAXIN) 500 mg tablet    metoprolol succinate (TOPROL-XL) 50 mg 24 hr tablet    montelukast (SINGULAIR) 10 mg tablet    nitroglycerin (NITROSTAT) 0 4 mg SL tablet    Omega-3 1000 MG CAPS    PNEUMOVAX 23 25 MCG/0 5ML    traMADol (ULTRAM) 50 mg tablet    zolpidem (AMBIEN) 10 mg tablet    albuterol (PROAIR HFA) 90 mcg/act inhaler    guaiFENesin (ROBITUSSIN) 100 MG/5ML oral liquid    omeprazole (PriLOSEC) 20 mg delayed release capsule    Allergies   Allergen Reactions    Wound Dressing Adhesive      Tape     Wound Dressings Rash     Swelling, red           Objective     Blood pressure 144/82, pulse 74, weight 111 kg (244 lb), not currently breastfeeding  Body mass index is 49 28 kg/m²          PHYSICAL EXAM:      General Appearance:   Alert, cooperative, no distress   HEENT:   Normocephalic, atraumatic, anicteric      Neck:  Supple, symmetrical, trachea midline   Lungs:   Clear to auscultation bilaterally; no rales, rhonchi or wheezing; respirations unlabored    Heart[de-identified]   Regular rate and rhythm; no murmur, rub, or gallop  Abdomen:   Soft, tender in RUQ and epigastrum, non-distended; normal bowel sounds; no masses, no organomegaly    Genitalia:   Deferred    Rectal:   Deferred    Extremities:  No cyanosis, clubbing or edema    Pulses:  2+ and symmetric    Skin:  No jaundice, rashes, or lesions    Lymph nodes:  No palpable cervical lymphadenopathy        Lab Results:   No visits with results within 1 Day(s) from this visit  Latest known visit with results is:   Appointment on 11/29/2018   Component Date Value    Sodium 11/29/2018 135*    Potassium 11/29/2018 3 6     Chloride 11/29/2018 101     CO2 11/29/2018 31     ANION GAP 11/29/2018 3*    BUN 11/29/2018 14     Creatinine 11/29/2018 0 84     Glucose, Fasting 11/29/2018 110*    Calcium 11/29/2018 9 9     AST 11/29/2018 18     ALT 11/29/2018 24     Alkaline Phosphatase 11/29/2018 88     Total Protein 11/29/2018 7 7     Albumin 11/29/2018 3 4*    Total Bilirubin 11/29/2018 0 37     eGFR 11/29/2018 73     Cholesterol 11/29/2018 118     Triglycerides 11/29/2018 183*    HDL, Direct 11/29/2018 54     LDL Calculated 11/29/2018 27     Non-HDL-Chol (CHOL-HDL) 11/29/2018 64     Hemoglobin A1C 11/29/2018 7 2*    EAG 11/29/2018 160          Radiology Results:   No results found

## 2019-02-11 ENCOUNTER — TELEPHONE (OUTPATIENT)
Dept: GASTROENTEROLOGY | Facility: CLINIC | Age: 64
End: 2019-02-11

## 2019-02-11 NOTE — TELEPHONE ENCOUNTER
We do not have her colonoscopy report - can we please obtain   egd was normal and biopsy was benign (normal) pls let patient know

## 2019-02-11 NOTE — TELEPHONE ENCOUNTER
Obtained Colonoscopy report in [facility] and presented to [facility]    Colonoscopy was norm     Called PT and advised colonoscopy, egd and biopsy were all normal

## 2019-02-11 NOTE — TELEPHONE ENCOUNTER
Dr Leatha Marc - Patient called to get results of colonoscopy performed on 01/22/19   Please call [phone]

## 2019-05-10 ENCOUNTER — ANNUAL EXAM (OUTPATIENT)
Dept: OBGYN CLINIC | Age: 64
End: 2019-05-10
Payer: COMMERCIAL

## 2019-05-10 ENCOUNTER — TELEPHONE (OUTPATIENT)
Dept: OBGYN CLINIC | Age: 64
End: 2019-05-10

## 2019-05-10 VITALS
HEIGHT: 59 IN | SYSTOLIC BLOOD PRESSURE: 128 MMHG | DIASTOLIC BLOOD PRESSURE: 82 MMHG | WEIGHT: 248 LBS | BODY MASS INDEX: 50 KG/M2

## 2019-05-10 DIAGNOSIS — N95.2 ATROPHIC VAGINITIS: ICD-10-CM

## 2019-05-10 DIAGNOSIS — T14.8XXA OPEN WOUND: ICD-10-CM

## 2019-05-10 DIAGNOSIS — Z12.31 ENCOUNTER FOR SCREENING MAMMOGRAM FOR MALIGNANT NEOPLASM OF BREAST: ICD-10-CM

## 2019-05-10 DIAGNOSIS — L30.4 INTERTRIGO: ICD-10-CM

## 2019-05-10 DIAGNOSIS — L30.9 DERMATITIS: ICD-10-CM

## 2019-05-10 DIAGNOSIS — Z01.411 ENCOUNTER FOR GYNECOLOGICAL EXAMINATION WITH ABNORMAL FINDING: Primary | ICD-10-CM

## 2019-05-10 DIAGNOSIS — Z78.0 ASYMPTOMATIC POSTMENOPAUSAL STATE: ICD-10-CM

## 2019-05-10 PROBLEM — Z13.89 SCREENING FOR SKIN CONDITION: Status: RESOLVED | Noted: 2018-04-18 | Resolved: 2019-05-10

## 2019-05-10 PROBLEM — M81.0 AGE-RELATED OSTEOPOROSIS WITHOUT CURRENT PATHOLOGICAL FRACTURE: Status: ACTIVE | Noted: 2019-05-10

## 2019-05-10 PROCEDURE — 87147 CULTURE TYPE IMMUNOLOGIC: CPT | Performed by: NURSE PRACTITIONER

## 2019-05-10 PROCEDURE — G0101 CA SCREEN;PELVIC/BREAST EXAM: HCPCS | Performed by: NURSE PRACTITIONER

## 2019-05-10 PROCEDURE — 87070 CULTURE OTHR SPECIMN AEROBIC: CPT | Performed by: NURSE PRACTITIONER

## 2019-05-10 PROCEDURE — 87205 SMEAR GRAM STAIN: CPT | Performed by: NURSE PRACTITIONER

## 2019-05-10 RX ORDER — ALENDRONATE SODIUM 70 MG/1
TABLET ORAL
COMMUNITY
Start: 2019-04-05

## 2019-05-10 RX ORDER — CLOTRIMAZOLE AND BETAMETHASONE DIPROPIONATE 10; .64 MG/G; MG/G
CREAM TOPICAL 2 TIMES DAILY
Qty: 45 G | Refills: 1 | Status: SHIPPED | OUTPATIENT
Start: 2019-05-10

## 2019-05-12 LAB
BACTERIA WND AEROBE CULT: ABNORMAL
BACTERIA WND AEROBE CULT: ABNORMAL
GRAM STN SPEC: ABNORMAL
GRAM STN SPEC: ABNORMAL

## 2019-05-13 DIAGNOSIS — T14.8XXA OPEN WOUND: Primary | ICD-10-CM

## 2019-05-13 RX ORDER — PENICILLIN V POTASSIUM 500 MG/1
500 TABLET ORAL EVERY 6 HOURS SCHEDULED
Qty: 40 TABLET | Refills: 0 | Status: SHIPPED | OUTPATIENT
Start: 2019-05-13 | End: 2019-05-23

## 2019-07-10 ENCOUNTER — HOSPITAL ENCOUNTER (OUTPATIENT)
Dept: MAMMOGRAPHY | Facility: CLINIC | Age: 64
Discharge: HOME | End: 2019-07-10
Payer: MEDICARE

## 2019-07-10 VITALS — HEIGHT: 59 IN | WEIGHT: 253 LBS | BODY MASS INDEX: 51 KG/M2

## 2019-07-10 DIAGNOSIS — Z12.31 ENCOUNTER FOR SCREENING MAMMOGRAM FOR MALIGNANT NEOPLASM OF BREAST: ICD-10-CM

## 2019-07-10 PROCEDURE — 77067 SCR MAMMO BI INCL CAD: CPT

## 2019-07-10 PROCEDURE — 77063 BREAST TOMOSYNTHESIS BI: CPT

## 2022-01-31 NOTE — TELEPHONE ENCOUNTER
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 
Per [facility] pharmacy, pt needs prior auth for premarin cream rx'd 05/07 by ramya  Need to call her secondary ins which is [facility] at [phone]  ID # [id number]  Bin# [id number]    linda De Souza  [phone]    Pt awaits response tomorrow  thanks
sciatic pain

## 2022-03-15 ENCOUNTER — APPOINTMENT (EMERGENCY)
Dept: RADIOLOGY | Facility: HOSPITAL | Age: 67
End: 2022-03-15
Payer: COMMERCIAL

## 2022-03-15 ENCOUNTER — HOSPITAL ENCOUNTER (EMERGENCY)
Facility: HOSPITAL | Age: 67
Discharge: HOME | End: 2022-03-15
Attending: EMERGENCY MEDICINE | Admitting: EMERGENCY MEDICINE
Payer: COMMERCIAL

## 2022-03-15 ENCOUNTER — TELEPHONE (OUTPATIENT)
Dept: NEUROLOGY | Facility: CLINIC | Age: 67
End: 2022-03-15

## 2022-03-15 ENCOUNTER — APPOINTMENT (EMERGENCY)
Dept: CT IMAGING | Facility: HOSPITAL | Age: 67
End: 2022-03-15
Payer: COMMERCIAL

## 2022-03-15 VITALS
OXYGEN SATURATION: 94 % | DIASTOLIC BLOOD PRESSURE: 84 MMHG | BODY MASS INDEX: 45.65 KG/M2 | WEIGHT: 226 LBS | HEART RATE: 64 BPM | TEMPERATURE: 98 F | SYSTOLIC BLOOD PRESSURE: 164 MMHG | RESPIRATION RATE: 30 BRPM

## 2022-03-15 DIAGNOSIS — K43.9 EPIGASTRIC HERNIA: Primary | ICD-10-CM

## 2022-03-15 DIAGNOSIS — R07.9 CHEST PAIN, UNSPECIFIED TYPE: ICD-10-CM

## 2022-03-15 DIAGNOSIS — R51.9 NONINTRACTABLE HEADACHE, UNSPECIFIED CHRONICITY PATTERN, UNSPECIFIED HEADACHE TYPE: ICD-10-CM

## 2022-03-15 LAB
2HR DELTA HS TROPONIN: 2 NG/L
ALBUMIN SERPL BCP-MCNC: 3.5 G/DL (ref 3.5–5)
ALP SERPL-CCNC: 88 U/L (ref 46–116)
ALT SERPL W P-5'-P-CCNC: 20 U/L (ref 12–78)
ANION GAP SERPL CALCULATED.3IONS-SCNC: 10 MMOL/L (ref 4–13)
APTT PPP: 40 SECONDS (ref 23–37)
AST SERPL W P-5'-P-CCNC: 19 U/L (ref 5–45)
ATRIAL RATE: 77 BPM
BASOPHILS # BLD AUTO: 0.05 THOUSANDS/ΜL (ref 0–0.1)
BASOPHILS NFR BLD AUTO: 1 % (ref 0–1)
BILIRUB SERPL-MCNC: 0.65 MG/DL (ref 0.2–1)
BUN SERPL-MCNC: 14 MG/DL (ref 5–25)
CALCIUM SERPL-MCNC: 9.2 MG/DL (ref 8.3–10.1)
CARDIAC TROPONIN I PNL SERPL HS: 5 NG/L
CARDIAC TROPONIN I PNL SERPL HS: 7 NG/L
CHLORIDE SERPL-SCNC: 103 MMOL/L (ref 100–108)
CO2 SERPL-SCNC: 24 MMOL/L (ref 21–32)
CREAT SERPL-MCNC: 0.75 MG/DL (ref 0.6–1.3)
EOSINOPHIL # BLD AUTO: 0.07 THOUSAND/ΜL (ref 0–0.61)
EOSINOPHIL NFR BLD AUTO: 1 % (ref 0–6)
ERYTHROCYTE [DISTWIDTH] IN BLOOD BY AUTOMATED COUNT: 13.5 % (ref 11.6–15.1)
GFR SERPL CREATININE-BSD FRML MDRD: 82 ML/MIN/1.73SQ M
GLUCOSE SERPL-MCNC: 133 MG/DL (ref 65–140)
HCT VFR BLD AUTO: 40.1 % (ref 34.8–46.1)
HGB BLD-MCNC: 13.4 G/DL (ref 11.5–15.4)
IMM GRANULOCYTES # BLD AUTO: 0.03 THOUSAND/UL (ref 0–0.2)
IMM GRANULOCYTES NFR BLD AUTO: 0 % (ref 0–2)
INR PPP: 1.14 (ref 0.84–1.19)
LIPASE SERPL-CCNC: 48 U/L (ref 73–393)
LYMPHOCYTES # BLD AUTO: 2.09 THOUSANDS/ΜL (ref 0.6–4.47)
LYMPHOCYTES NFR BLD AUTO: 21 % (ref 14–44)
MAGNESIUM SERPL-MCNC: 2 MG/DL (ref 1.6–2.6)
MCH RBC QN AUTO: 30.7 PG (ref 26.8–34.3)
MCHC RBC AUTO-ENTMCNC: 33.4 G/DL (ref 31.4–37.4)
MCV RBC AUTO: 92 FL (ref 82–98)
MONOCYTES # BLD AUTO: 0.71 THOUSAND/ΜL (ref 0.17–1.22)
MONOCYTES NFR BLD AUTO: 7 % (ref 4–12)
NEUTROPHILS # BLD AUTO: 7.24 THOUSANDS/ΜL (ref 1.85–7.62)
NEUTS SEG NFR BLD AUTO: 70 % (ref 43–75)
NRBC BLD AUTO-RTO: 0 /100 WBCS
P AXIS: 67 DEGREES
PLATELET # BLD AUTO: 292 THOUSANDS/UL (ref 149–390)
PMV BLD AUTO: 9.1 FL (ref 8.9–12.7)
POTASSIUM SERPL-SCNC: 3.8 MMOL/L (ref 3.5–5.3)
PR INTERVAL: 162 MS
PROT SERPL-MCNC: 7.8 G/DL (ref 6.4–8.2)
PROTHROMBIN TIME: 14.2 SECONDS (ref 11.6–14.5)
QRS AXIS: 49 DEGREES
QRSD INTERVAL: 86 MS
QT INTERVAL: 404 MS
QTC INTERVAL: 457 MS
RBC # BLD AUTO: 4.37 MILLION/UL (ref 3.81–5.12)
SODIUM SERPL-SCNC: 137 MMOL/L (ref 136–145)
T WAVE AXIS: 87 DEGREES
TSH SERPL DL<=0.05 MIU/L-ACNC: 2.95 UIU/ML (ref 0.36–3.74)
VENTRICULAR RATE: 77 BPM
WBC # BLD AUTO: 10.19 THOUSAND/UL (ref 4.31–10.16)

## 2022-03-15 PROCEDURE — 84484 ASSAY OF TROPONIN QUANT: CPT | Performed by: EMERGENCY MEDICINE

## 2022-03-15 PROCEDURE — 85025 COMPLETE CBC W/AUTO DIFF WBC: CPT | Performed by: EMERGENCY MEDICINE

## 2022-03-15 PROCEDURE — 70450 CT HEAD/BRAIN W/O DYE: CPT

## 2022-03-15 PROCEDURE — 36415 COLL VENOUS BLD VENIPUNCTURE: CPT | Performed by: EMERGENCY MEDICINE

## 2022-03-15 PROCEDURE — 99285 EMERGENCY DEPT VISIT HI MDM: CPT

## 2022-03-15 PROCEDURE — 84443 ASSAY THYROID STIM HORMONE: CPT | Performed by: EMERGENCY MEDICINE

## 2022-03-15 PROCEDURE — 85730 THROMBOPLASTIN TIME PARTIAL: CPT | Performed by: EMERGENCY MEDICINE

## 2022-03-15 PROCEDURE — 93005 ELECTROCARDIOGRAM TRACING: CPT

## 2022-03-15 PROCEDURE — 80053 COMPREHEN METABOLIC PANEL: CPT | Performed by: EMERGENCY MEDICINE

## 2022-03-15 PROCEDURE — 74177 CT ABD & PELVIS W/CONTRAST: CPT

## 2022-03-15 PROCEDURE — 71046 X-RAY EXAM CHEST 2 VIEWS: CPT

## 2022-03-15 PROCEDURE — 96375 TX/PRO/DX INJ NEW DRUG ADDON: CPT

## 2022-03-15 PROCEDURE — 71275 CT ANGIOGRAPHY CHEST: CPT

## 2022-03-15 PROCEDURE — 99285 EMERGENCY DEPT VISIT HI MDM: CPT | Performed by: EMERGENCY MEDICINE

## 2022-03-15 PROCEDURE — 96365 THER/PROPH/DIAG IV INF INIT: CPT

## 2022-03-15 PROCEDURE — 83690 ASSAY OF LIPASE: CPT | Performed by: EMERGENCY MEDICINE

## 2022-03-15 PROCEDURE — 93010 ELECTROCARDIOGRAM REPORT: CPT | Performed by: INTERNAL MEDICINE

## 2022-03-15 PROCEDURE — 85610 PROTHROMBIN TIME: CPT | Performed by: EMERGENCY MEDICINE

## 2022-03-15 PROCEDURE — G1004 CDSM NDSC: HCPCS

## 2022-03-15 PROCEDURE — 83735 ASSAY OF MAGNESIUM: CPT | Performed by: EMERGENCY MEDICINE

## 2022-03-15 RX ORDER — METOCLOPRAMIDE HYDROCHLORIDE 5 MG/ML
10 INJECTION INTRAMUSCULAR; INTRAVENOUS ONCE
Status: COMPLETED | OUTPATIENT
Start: 2022-03-15 | End: 2022-03-15

## 2022-03-15 RX ORDER — DIPHENHYDRAMINE HYDROCHLORIDE 50 MG/ML
25 INJECTION INTRAMUSCULAR; INTRAVENOUS ONCE
Status: COMPLETED | OUTPATIENT
Start: 2022-03-15 | End: 2022-03-15

## 2022-03-15 RX ORDER — MAGNESIUM SULFATE 1 G/100ML
1 INJECTION INTRAVENOUS ONCE
Status: COMPLETED | OUTPATIENT
Start: 2022-03-15 | End: 2022-03-15

## 2022-03-15 RX ADMIN — MAGNESIUM SULFATE HEPTAHYDRATE 1 G: 1 INJECTION, SOLUTION INTRAVENOUS at 15:32

## 2022-03-15 RX ADMIN — METOCLOPRAMIDE HYDROCHLORIDE 10 MG: 5 INJECTION INTRAMUSCULAR; INTRAVENOUS at 13:25

## 2022-03-15 RX ADMIN — DIPHENHYDRAMINE HYDROCHLORIDE 25 MG: 50 INJECTION, SOLUTION INTRAMUSCULAR; INTRAVENOUS at 13:25

## 2022-03-15 RX ADMIN — IOHEXOL 100 ML: 350 INJECTION, SOLUTION INTRAVENOUS at 14:12

## 2022-03-15 NOTE — TELEPHONE ENCOUNTER
Patient calling to schedule new patient appointment for headaches  Patient confirmed this was the primary diagnosis  Some testing done in past   Triage intake sent

## 2022-03-15 NOTE — ED PROVIDER NOTES
Pt Name: Landy Amezcua  MRN: [id number]  [place] 1955  Age/Sex: 74 y o  female  Date of evaluation: 3/15/2022  PCP: Eddy Noriega MD    [address] [place] [address]    Chief Complaint   Patient presents with    Chest Pain     Nausea, diarrhea, chest pains in middle chest pt describes a pressure  Nitro taken yesterday at night  Headaches and high blood pressure, sent by PCP  New stents placed over the summer         HPI and MDM    74 y o  female presenting with chest pain  Chest pressure for 1 week intermittently, also has had headache intermittently for one week  Yesterday started having nausea, vomiting, and diarrhea  Also complains of right sided abdominal pain  States she also is noticing that her ankles are getting swollen  No sick contacts  Hx of ACS s/p stent last summer, these sx do not feel similar  Neurologically intact without any focal deficits  Workup revealing large epigastric hernia, patient is aware of this  Since it is causing her issues now, advised her to follow up with General surgery  She has a heart score of 5  Advise cardiology follow-up  CT head is reassuring, she is following up with Neurology outpatient  Patient is feeling better upon re-evaluation  Patient updated with results  Family also updated with results  Return precautions discussed  Medications   metoclopramide (REGLAN) injection 10 mg (10 mg Intravenous Given 3/15/22 1325)   diphenhydrAMINE (BENADRYL) injection 25 mg (25 mg Intravenous Given 3/15/22 1325)   iohexol (OMNIPAQUE) 350 MG/ML injection (SINGLE-DOSE) 100 mL (100 mL Intravenous Given 3/15/22 1412)   magnesium sulfate IVPB (premix) SOLN 1 g (0 g Intravenous Stopped 3/15/22 1632)         Past Medical and Surgical History    Past Medical History:   Diagnosis Date    Angina at rest [facility])     Arthritis       Back problem     Diabetes ([facility] [address] )     Gall stones     Heart attack ([facility] [address] )     High blood pressure     Stomach problems        Past Surgical History:   Procedure Laterality Date    CORONARY STENT PLACEMENT      x3    GALLBLADDER SURGERY      HYSTERECTOMY      KNEE SURGERY Right     OTHER SURGICAL HISTORY      Double Bypass        Family History   Problem Relation Age of Onset    Breast cancer Mother 46    Colon cancer Father     Breast cancer Maternal Aunt     Ovarian cancer Maternal Aunt     Breast cancer Maternal Aunt     Cervical cancer Maternal Aunt     Uterine cancer Maternal Aunt     No Known Problems Sister     No Known Problems Daughter     No Known Problems Sister     No Known Problems Sister        Social History     Tobacco Use    Smoking status: Former Smoker    Smokeless tobacco: Never Used   Substance Use Topics    Alcohol use: No    Drug use: No           Allergies    Allergies   Allergen Reactions    Wound Dressing Adhesive      Tape     Wound Dressings Rash     Swelling, red       Home Medications    Prior to Admission medications    Medication Sig Start Date End Date Taking?  Authorizing Provider   albuterol (PROAIR HFA) 90 mcg/act inhaler USE 2 INHALATIONS FOUR TIMES A DAY 11/13/18   Historical Provider, MD   alendronate (FOSAMAX) 70 mg tablet  4/5/19   Historical Provider, MD   ALPRAZolam Evetta Vargas) 2 MG tablet 1mg -2mg 3 times daily prn    Historical Provider, MD   aspirin 81 MG tablet Take by mouth    Historical Provider, MD   atorvastatin (LIPITOR) 80 mg tablet Take 80 mg by mouth 7/31/13   Historical Provider, MD   calcium-vitamin D 250-100 MG-UNIT per tablet Take 1 tablet by mouth 2 (two) times a day    Historical Provider, MD   clopidogrel (PLAVIX) 75 mg tablet TAKE 1 TABLET DAILY 11/21/17   Historical Provider, MD   clotrimazole-betamethasone (Robin Claude) 1-0 05 % cream Apply topically 2 (two) times a day 5/10/19   ANASTACIA Arriaga   conjugated estrogens (PREMARIN) vaginal cream Insert 0 5 g into the vagina 2 (two) times a week for 30 days 5/13/19 6/12/19  ANASTACIA rAriaga   cyanocobalamin (VITAMIN B-12) 100 mcg tablet Take 1 tablet by mouth    Historical Provider, MD   guaiFENesin (ROBITUSSIN) 100 MG/5ML oral liquid Take 100 mg by mouth Three times daily as needed    Historical Provider, MD   lisinopril (ZESTRIL) 10 mg tablet Take by mouth    Historical Provider, MD   methocarbamol (ROBAXIN) 500 mg tablet Take 500 mg by mouth 7/31/13   Historical Provider, MD   metoprolol succinate (TOPROL-XL) 50 mg 24 hr tablet TAKE 1 TABLET DAILY 12/5/17   Historical Provider, MD   montelukast (SINGULAIR) 10 mg tablet Take 10 mg by mouth 12/4/17   Historical Provider, MD   nitroglycerin (NITROSTAT) 0 4 mg SL tablet Take 1 tablet by mouth Three times a day    Historical Provider, MD   Nicasio-3 1000 MG CAPS Take by mouth    Historical Provider, MD   omeprazole (PriLOSEC) 20 mg delayed release capsule Take 1 capsule (20 mg total) by mouth daily 1/17/19   Jakob Caldwell DO   PNEUMOVAX 23 25 MCG/0 5ML TO BE ADMINISTERED BY PHARMACIST FOR IMMUNIZATION 11/5/18   Historical Provider, MD   terconazole (TERAZOL 7) 0 4 % vaginal cream Insert 1 applicator into the vagina daily at bedtime 5/13/19   ANASTACIA Arriaga   traMADol (ULTRAM) 50 mg tablet Take 50 mg by mouth 7/31/13   Historical Provider, MD   zolpidem (AMBIEN) 10 mg tablet Take 10 mg by mouth daily 7/31/13   Historical Provider, MD           Review of Systems    Review of Systems   Constitutional: Negative for chills and fever  HENT: Negative for rhinorrhea and sore throat  Eyes: Negative for pain and visual disturbance  Respiratory: Negative for cough and shortness of breath  Cardiovascular: Positive for chest pain and leg swelling  Gastrointestinal: Positive for abdominal pain, diarrhea, nausea and vomiting  Negative for blood in stool  Genitourinary: Negative for dysuria and hematuria  Musculoskeletal: Negative for back pain and myalgias  Skin: Negative for rash and wound  Neurological: Positive for headaches  Negative for syncope             Physical Exam      ED Triage Vitals [03/15/22 1306]   Temperature Pulse Respirations Blood Pressure SpO2   98 °F (36 7 °C) 78 18 (!) 188/84 96 %      Temp src Heart Rate Source Patient Position - Orthostatic VS BP Location FiO2 (%)   -- Monitor Sitting Left arm --      Pain Score       --               Physical Exam  Constitutional:       General: She is not in acute distress  Appearance: She is not ill-appearing  HENT:      Head: Normocephalic and atraumatic  Nose: Nose normal    Eyes:      Extraocular Movements: Extraocular movements intact  Pupils: Pupils are equal, round, and reactive to light  Cardiovascular:      Rate and Rhythm: Normal rate and regular rhythm  Pulmonary:      Effort: No respiratory distress  Breath sounds: Normal breath sounds  No wheezing  Chest:      Chest wall: No tenderness  Abdominal:      General: There is no distension  Palpations: Abdomen is soft  Tenderness: There is no abdominal tenderness  There is no guarding or rebound  Musculoskeletal:         General: No tenderness or deformity  Normal range of motion  Cervical back: Normal range of motion and neck supple  Right lower leg: No edema  Left lower leg: No edema  Skin:     General: Skin is warm  Findings: No erythema  Neurological:      Mental Status: She is alert and oriented to person, place, and time  Mental status is at baseline  Cranial Nerves: No cranial nerve deficit  Sensory: No sensory deficit  Motor: No weakness        Comments: No visual field deficits   Psychiatric:         Mood and Affect: Mood normal               Diagnostic Results      Labs:    Results Reviewed     Procedure Component Value Units Date/Time    HS Troponin I 2hr [[id number]]  (Normal) Collected: 03/15/22 1601    Lab Status: Final result Specimen: Blood from Arm, Left Updated: 03/15/22 1633     hs TnI 2hr 7 ng/L      Delta 2hr hsTnI 2 ng/L     Protime-INR [[id number]]  (Normal) Collected: 03/15/22 1324    Lab Status: Final result Specimen: Blood from Arm, Left Updated: 03/15/22 1357     Protime 14 2 seconds      INR 1 14    APTT [[id number]]  (Abnormal) Collected: 03/15/22 1324    Lab Status: Final result Specimen: Blood from Arm, Left Updated: 03/15/22 1357     PTT 40 seconds     TSH, 3rd generation with Free T4 reflex [[id number]]  (Normal) Collected: 03/15/22 1324    Lab Status: Final result Specimen: Blood from Arm, Left Updated: 03/15/22 1357     TSH 3RD GENERATON 2 949 uIU/mL     Narrative:      Patients undergoing fluorescein dye angiography may retain small amounts of fluorescein in the body for 48-72 hours post procedure  Samples containing fluorescein can produce falsely depressed TSH values  If the patient had this procedure,a specimen should be resubmitted post fluorescein clearance        HS Troponin 0hr (reflex protocol) [[id number]]  (Normal) Collected: 03/15/22 1324    Lab Status: Final result Specimen: Blood from Arm, Left Updated: 03/15/22 1356     hs TnI 0hr 5 ng/L     Comprehensive metabolic panel [[id number]] Collected: 03/15/22 1324    Lab Status: Final result Specimen: Blood from Arm, Left Updated: 03/15/22 1351     Sodium 137 mmol/L      Potassium 3 8 mmol/L      Chloride 103 mmol/L      CO2 24 mmol/L      ANION GAP 10 mmol/L      BUN 14 mg/dL      Creatinine 0 75 mg/dL      Glucose 133 mg/dL      Calcium 9 2 mg/dL      AST 19 U/L      ALT 20 U/L      Alkaline Phosphatase 88 U/L      Total Protein 7 8 g/dL      Albumin 3 5 g/dL      Total Bilirubin 0 65 mg/dL      eGFR 82 ml/min/1 73sq m     Narrative:      Mary guidelines for Chronic Kidney Disease (CKD):     Stage 1 with normal or high GFR (GFR > 90 mL/min/1 73 square meters)    Stage 2 Mild CKD (GFR = 60-89 mL/min/1 73 square meters)    Stage 3A Moderate CKD (GFR = 45-59 mL/min/1 73 square meters)    Stage 3B Moderate CKD (GFR = 30-44 mL/min/1 73 square meters)    Stage 4 Severe CKD (GFR = 15-29 mL/min/1 73 square meters)    Stage 5 End Stage CKD (GFR <15 mL/min/1 73 square meters)  Note: GFR calculation is accurate only with a steady state creatinine    Lipase [[id number]]  (Abnormal) Collected: 03/15/22 1324    Lab Status: Final result Specimen: Blood from Arm, Left Updated: 03/15/22 1351     Lipase 48 u/L     Magnesium [[id number]]  (Normal) Collected: 03/15/22 1324    Lab Status: Final result Specimen: Blood from Arm, Left Updated: 03/15/22 1351     Magnesium 2 0 mg/dL     CBC and differential [[id number]]  (Abnormal) Collected: 03/15/22 1324    Lab Status: Final result Specimen: Blood from Arm, Left Updated: 03/15/22 1334     WBC 10 19 Thousand/uL      RBC 4 37 Million/uL      Hemoglobin 13 4 g/dL      Hematocrit 40 1 %      MCV 92 fL      MCH 30 7 pg      MCHC 33 4 g/dL      RDW 13 5 %      MPV 9 1 fL      Platelets 836 Thousands/uL      nRBC 0 /100 WBCs      Neutrophils Relative 70 %      Immat GRANS % 0 %      Lymphocytes Relative 21 %      Monocytes Relative 7 %      Eosinophils Relative 1 %      Basophils Relative 1 %      Neutrophils Absolute 7 24 Thousands/µL      Immature Grans Absolute 0 03 Thousand/uL      Lymphocytes Absolute 2 09 Thousands/µL      Monocytes Absolute 0 71 Thousand/µL      Eosinophils Absolute 0 07 Thousand/µL      Basophils Absolute 0 05 Thousands/µL           All labs reviewed and utilized in the medical decision making process    Radiology:    CT head without contrast   Final Result      No acute intracranial abnormality  Workstation performed: [id number]         PE Study with CT Abdomen and Pelvis with contrast   Final Result      No evidence of pulmonary embolus  The abdomen demonstrates no evidence of small bowel obstruction or inflammatory change  A large epigastric hernia is seen to the right of midline at the level of the liver  Bronchial thickening could be related to bronchitis                 The study was marked in [facility] for immediate notification  Workstation performed: [id number]         XR chest 2 views    (Results Pending)       All radiology studies independently viewed by me and interpreted by the radiologist     Procedure    Procedures        FINAL IMPRESSION    Final diagnoses:   Epigastric hernia   Chest pain, unspecified type   Nonintractable headache, unspecified chronicity pattern, unspecified headache type         DISPOSITION    Time reflects when diagnosis was documented in both MDM as applicable and the Disposition within this note     Time User Action Codes Description Comment    3/15/2022  4:51 PM Alonza Ridges Add [K43 9] Epigastric hernia     3/15/2022  4:51 PM Alonza Ridges Add [R07 9] Chest pain, unspecified type     3/15/2022  4:51 PM Alonza Ridges Add [R51 9] Nonintractable headache, unspecified chronicity pattern, unspecified headache type       ED Disposition     ED Disposition Condition Date/Time Comment    Discharge Stable Tu Mar 15, 2022  5:16 PM Latonia Hernandez discharge to home/self care              Follow-up Information     Follow up With Specialties Details Why [address] [facility] General Surgery, Osteopathic Medicine Schedule an appointment as soon as possible for a visit  For hernia [address] [facility] [address] [facility]  [address]  [facility] [address] Hidalgo MD Internal Medicine Schedule an appointment as soon as possible for a visit in 1 week  [address] [address]              PATIENT REFERRED TO:    Jose Gordon DO  [address]  [facility] [address]    Schedule an appointment as soon as possible for a visit   For hernia    Thi Hidalgo MD  [address]  [phone]    Schedule an appointment as soon as possible for a visit in 1 week        DISCHARGE MEDICATIONS:    Discharge Medication List as of 3/15/2022  5:19 PM      CONTINUE these medications which have NOT CHANGED    Details   albuterol (PROAIR HFA) 90 mcg/act inhaler USE 2 INHALATIONS FOUR TIMES A DAY, Historical Med      alendronate (FOSAMAX) 70 mg tablet Starting Fri 4/5/2019, Historical Med      ALPRAZolam (XANAX) 2 MG tablet 1mg -2mg 3 times daily prn, Historical Med      aspirin 81 MG tablet Take by mouth, Historical Med      atorvastatin (LIPITOR) 80 mg tablet Take 80 mg by mouth, Starting Wed 7/31/2013, Historical Med      calcium-vitamin D 250-100 MG-UNIT per tablet Take 1 tablet by mouth 2 (two) times a day, Historical Med      clopidogrel (PLAVIX) 75 mg tablet TAKE 1 TABLET DAILY, Historical Med      clotrimazole-betamethasone (LOTRISONE) 1-0 05 % cream Apply topically 2 (two) times a day, Starting Fri 5/10/2019, Normal      conjugated estrogens (PREMARIN) vaginal cream Insert 0 5 g into the vagina 2 (two) times a week for 30 days, Starting Mon 5/13/2019, Until Wed 6/12/2019, Normal      cyanocobalamin (VITAMIN B-12) 100 mcg tablet Take 1 tablet by mouth, Historical Med      guaiFENesin (ROBITUSSIN) 100 MG/5ML oral liquid Take 100 mg by mouth Three times daily as needed, Historical Med      lisinopril (ZESTRIL) 10 mg tablet Take by mouth, Historical Med      methocarbamol (ROBAXIN) 500 mg tablet Take 500 mg by mouth, Starting Wed 7/31/2013, Historical Med      metoprolol succinate (TOPROL-XL) 50 mg 24 hr tablet TAKE 1 TABLET DAILY, Historical Med      montelukast (SINGULAIR) 10 mg tablet Take 10 mg by mouth, Starting Mon 12/4/2017, Historical Med      nitroglycerin (NITROSTAT) 0 4 mg SL tablet Take 1 tablet by mouth Three times a day, Historical Med      Omega-3 1000 MG CAPS Take by mouth, Historical Med      omeprazole (PriLOSEC) 20 mg delayed release capsule Take 1 capsule (20 mg total) by mouth daily, Starting u 1/17/2019, Normal      PNEUMOVAX 23 25 MCG/0 5ML TO BE ADMINISTERED BY PHARMACIST FOR IMMUNIZATION, Historical Med      terconazole (TERAZOL 7) 0 4 % vaginal cream Insert 1 applicator into the vagina daily at bedtime, Starting Mon 5/13/2019, Normal      traMADol (ULTRAM) 50 mg tablet Take 50 mg by mouth, Starting Wed 7/31/2013, Historical Med      zolpidem (AMBIEN) 10 mg tablet Take 10 mg by mouth daily, Starting Wed 7/31/2013, [address], DO        This note was partially completed using voice recognition technology, and was scanned for gross errors; however some errors may still exist  Please contact the author with any questions or requests for clarification        Sylvie Acuna, DO  03/15/22 0869

## 2022-03-17 ENCOUNTER — CONSULT (OUTPATIENT)
Dept: SURGERY | Facility: CLINIC | Age: 67
End: 2022-03-17
Payer: COMMERCIAL

## 2022-03-17 VITALS
TEMPERATURE: 98 F | HEART RATE: 72 BPM | WEIGHT: 223 LBS | HEIGHT: 59 IN | OXYGEN SATURATION: 94 % | RESPIRATION RATE: 16 BRPM | BODY MASS INDEX: 44.96 KG/M2 | SYSTOLIC BLOOD PRESSURE: 150 MMHG | DIASTOLIC BLOOD PRESSURE: 88 MMHG

## 2022-03-17 DIAGNOSIS — E66.01 MORBID OBESITY WITH BODY MASS INDEX (BMI) OF 45.0 TO 49.9 IN ADULT (HCC): ICD-10-CM

## 2022-03-17 DIAGNOSIS — K43.0 INCARCERATED INCISIONAL HERNIA: Primary | ICD-10-CM

## 2022-03-17 PROCEDURE — 99214 OFFICE O/P EST MOD 30 MIN: CPT | Performed by: SURGERY

## 2022-03-17 RX ORDER — PREDNISOLONE ACETATE 10 MG/ML
SUSPENSION/ DROPS OPHTHALMIC
COMMUNITY
Start: 2021-12-18

## 2022-03-17 RX ORDER — ALPRAZOLAM 0.5 MG/1
0.5 TABLET ORAL DAILY PRN
COMMUNITY
Start: 2021-12-15

## 2022-03-17 RX ORDER — ACETAMINOPHEN 325 MG/1
TABLET ORAL EVERY 6 HOURS
COMMUNITY

## 2022-03-17 RX ORDER — LORAZEPAM 0.5 MG/1
TABLET ORAL
COMMUNITY

## 2022-03-17 RX ORDER — UMECLIDINIUM BROMIDE AND VILANTEROL TRIFENATATE 62.5; 25 UG/1; UG/1
1 POWDER RESPIRATORY (INHALATION)
COMMUNITY
Start: 2021-11-29

## 2022-03-17 RX ORDER — AMLODIPINE BESYLATE 5 MG/1
TABLET ORAL
COMMUNITY
Start: 2022-03-17

## 2022-03-17 RX ORDER — EZETIMIBE 10 MG/1
TABLET ORAL
COMMUNITY

## 2022-03-17 RX ORDER — METOPROLOL SUCCINATE 25 MG/1
TABLET, EXTENDED RELEASE ORAL
COMMUNITY
Start: 2022-01-19

## 2022-03-17 RX ORDER — OFLOXACIN 3 MG/ML
SOLUTION/ DROPS OPHTHALMIC
COMMUNITY
Start: 2021-12-11

## 2022-03-17 RX ORDER — ALBUTEROL SULFATE 2.5 MG/3ML
SOLUTION RESPIRATORY (INHALATION)
COMMUNITY
Start: 2022-03-10

## 2022-03-17 RX ORDER — LOSARTAN POTASSIUM 25 MG/1
TABLET ORAL
COMMUNITY
Start: 2021-11-18

## 2022-03-17 RX ORDER — ALPRAZOLAM 0.25 MG/1
TABLET ORAL
COMMUNITY
Start: 2022-03-10

## 2022-03-17 RX ORDER — NYSTATIN 100000 [USP'U]/G
POWDER TOPICAL
COMMUNITY

## 2022-03-17 NOTE — PROGRESS NOTES
Consult- General Surgery   Kelly Virgen 74 y o  female MRN: [id number]  Unit/Bed#:  Encounter: [id number]    Assessment/Plan     Assessment:  Incarcerated incisional hernia, stable  Morbid obesity with BMI of 45  History of CAD, status post CABG and stent placement  History of smoking  History of COPD    Plan:  The patient has a moderate size incarcerated incisional hernia from prior open cholecystectomy, nontender to palpation, no significant symptoms at this time  She also has a BMI of 45, I will refer the patient to weight loss clinic before contemplating any surgical intervention  The patient will return to my office in 1 month to evaluate the progress of her weight loss  History of Present Illness     HPI:  Kelly Virgen is a 74 y o  female who presents to my office for evaluation of ventral hernia  The patient was complaining of chest pains, pain on the right side of her abdomen radiated to the lower right leg for which she went to the emergency room a couple days ago  She had extensive workup with CTA and CT scan of the abdomen and pelvis which revealed incisional hernia from prior open cholecystectomy  Patient does have occasional discomfort from the lump above the umbilicus on and off, especially when laying on her left side  She denies having any nausea, vomiting or any other constitutional symptoms  Review of Systems: The rest of the review of system total of 10 were negative except for the HPI  Historical Information   Past Medical History:   Diagnosis Date    [facility] rest [facility])     Arthritis       Back problem     Diabetes ([facility] [address] )     Gall stones     [facility] ([facility] )     High blood pressure     Stomach problems      Past Surgical History:   Procedure Laterality Date    CORONARY STENT PLACEMENT      x3    GALLBLADDER SURGERY      HYSTERECTOMY      KNEE SURGERY Right     OTHER SURGICAL HISTORY      Double Bypass      Social History   Social History     Substance and Sexual Activity   Alcohol Use No     Social History     Substance and Sexual Activity   Drug Use No     Social History     Tobacco Use   Smoking Status Former Smoker   Smokeless Tobacco Never Used     Family History: non-contributory    Meds/Allergies   all medications and allergies reviewed     Current Outpatient Medications:     acetaminophen (Tylenol) 325 mg tablet, every 6 (six) hours, Disp: , Rfl:     albuterol (2 5 mg/3 mL) 0 083 % nebulizer solution, , Disp: , Rfl:     albuterol (PROAIR HFA) 90 mcg/act inhaler, USE 2 INHALATIONS FOUR TIMES A DAY, Disp: , Rfl:     amLODIPine (NORVASC) 5 mg tablet, , Disp: , Rfl:     aspirin 81 MG tablet, Take by mouth, Disp: , Rfl:     atorvastatin (LIPITOR) 80 mg tablet, Take 80 mg by mouth, Disp: , Rfl:     calcium-vitamin D 250-100 MG-UNIT per tablet, Take 1 tablet by mouth 2 (two) times a day, Disp: , Rfl:     clopidogrel (PLAVIX) 75 mg tablet, TAKE 1 TABLET DAILY, Disp: , Rfl:     guaiFENesin (ROBITUSSIN) 100 MG/5ML oral liquid, Take 100 mg by mouth Three times daily as needed, Disp: , Rfl:     losartan (COZAAR) 25 mg tablet, losartan 25 mg tablet, Disp: , Rfl:     metoprolol succinate (TOPROL-XL) 50 mg 24 hr tablet, TAKE 1 TABLET DAILY, Disp: , Rfl:     nitroglycerin (NITROSTAT) 0 4 mg SL tablet, Take 1 tablet by mouth Three times a day, Disp: , Rfl:     Omega-3 1000 MG CAPS, Take by mouth, Disp: , Rfl:     sertraline (ZOLOFT) 50 mg tablet, Take 50 mg by mouth daily, Disp: , Rfl:     traMADol (ULTRAM) 50 mg tablet, Take 50 mg by mouth, Disp: , Rfl:     umeclidinium-vilanterol (Anoro Ellipta) 62 5-25 MCG/INH inhaler, Inhale 1 puff, Disp: , Rfl:     zolpidem (AMBIEN) 10 mg tablet, Take 10 mg by mouth daily, Disp: , Rfl:     alendronate (FOSAMAX) 70 mg tablet, , Disp: , Rfl:     ALPRAZolam (XANAX) 0 25 mg tablet, , Disp: , Rfl:     ALPRAZolam (XANAX) 0 5 mg tablet, Take 0 5 mg by mouth daily as needed, Disp: , Rfl:     ALPRAZolam (XANAX) 2 MG tablet, 1mg -2mg 3 times daily prn, Disp: , Rfl:     ascorbic Acid (VITAMIN C) 500 MG CPCR, Take 500 mg by mouth, Disp: , Rfl:     Cholecalciferol 25 MCG (1000 UT) capsule, Take 1,000 Units by mouth, Disp: , Rfl:     clotrimazole-betamethasone (LOTRISONE) 1-0 05 % cream, Apply topically 2 (two) times a day, Disp: 45 g, Rfl: 1    conjugated estrogens (PREMARIN) vaginal cream, Insert 0 5 g into the vagina 2 (two) times a week for 30 days, Disp: 42 5 g, Rfl: 1    cyanocobalamin (VITAMIN B-12) 100 mcg tablet, Take 1 tablet by mouth, Disp: , Rfl:     ezetimibe (Zetia) 10 mg tablet, None Entered, Disp: , Rfl:     fluticasone-salmeterol (Advair Diskus) 500-50 mcg/dose inhaler, None Entered, Disp: , Rfl:     lisinopril (ZESTRIL) 10 mg tablet, Take by mouth, Disp: , Rfl:     LORazepam (ATIVAN) 0 5 mg tablet, 1 TABLET TWICE DAILY, Disp: , Rfl:     methocarbamol (ROBAXIN) 500 mg tablet, Take 500 mg by mouth, Disp: , Rfl:     metoprolol succinate (TOPROL-XL) 25 mg 24 hr tablet, , Disp: , Rfl:     montelukast (SINGULAIR) 10 mg tablet, Take 10 mg by mouth, Disp: , Rfl:     nystatin (nystatin) powder, Nystop 100,000 unit/gram topical powder, Disp: , Rfl:     ofloxacin (OCUFLOX) 0 3 % ophthalmic solution, place 1 drop INTO AFFECTED EYE(S) four times a day as directed (S   (REFER TO PRESCRIPTION NOTES)  , Disp: , Rfl:     omeprazole (PriLOSEC) 20 mg delayed release capsule, Take 1 capsule (20 mg total) by mouth daily, Disp: 30 capsule, Rfl: 3    PNEUMOVAX 23 25 MCG/0 5ML, TO BE ADMINISTERED BY PHARMACIST FOR IMMUNIZATION, Disp: , Rfl: 0    prednisoLONE acetate (PRED FORTE) 1 % ophthalmic suspension, place 1 drop INTO AFFECTED EYE(S) four times a day as directed (S   (REFER TO PRESCRIPTION NOTES)  , Disp: , Rfl:     terconazole (TERAZOL 7) 0 4 % vaginal cream, Insert 1 applicator into the vagina daily at bedtime, Disp: 45 g, Rfl: 0  Allergies   Allergen Reactions    Wound Dressing Adhesive      Tape     Wound Dressings Rash Swelling, red       Objective     Current Vitals:   Blood Pressure: 150/88 (03/17/22 1310)  Pulse: 72 (03/17/22 1310)  Temperature: 98 °F (36 7 °C) (03/17/22 1310)  Temp Source: Oral (03/17/22 1310)  Respirations: 16 (03/17/22 1310)  Height: 4' 11" (149 9 cm) (03/17/22 1310)  Weight - Scale: 101 kg (223 lb) (03/17/22 1310)  SpO2: 94 % (03/17/22 1310)    Physical Exam  Vitals and nursing note reviewed  Constitutional:       General: She is not in acute distress  Appearance: She is obese  Cardiovascular:      Rate and Rhythm: Normal rate and regular rhythm  Comments: Mid sternotomy surgical scar  Pulmonary:      Effort: No respiratory distress  Breath sounds: Normal breath sounds  Abdominal:      Comments: Abdomen obese, soft, nondistended and nontender, right subcostal incision well healed  There is a lump medial to the subcostal incision, nontender to palpation, non reducible  Skin:     General: Skin is warm  Coloration: Skin is not jaundiced  Findings: No erythema or rash  Neurological:      Mental Status: She is alert and oriented to person, place, and time  Cranial Nerves: No cranial nerve deficit  Psychiatric:         Mood and Affect: Mood normal          Behavior: Behavior normal        Imaging: I have personally reviewed pertinent reports  and I have personally reviewed pertinent films in PACS  Procedure: XR chest 2 views    Result Date: 3/16/2022  Narrative: CHEST INDICATION:   cp  COMPARISON:  None EXAM PERFORMED/VIEWS:  XR CHEST PA & LATERAL  The frontal view was performed utilizing dual energy radiographic technique  FINDINGS: Cardiomediastinal silhouette appears unremarkable  The lungs are clear  CABG  No pneumothorax or pleural effusion  Osseous structures appear within normal limits for patient age  Impression: No acute cardiopulmonary disease   Workstation performed: [id number]     Procedure: CT head without contrast    Result Date: 3/15/2022  Narrative: CT BRAIN - WITHOUT CONTRAST INDICATION:   headache  COMPARISON:  Head CT April 20, 2018 TECHNIQUE:  CT examination of the brain was performed  In addition to axial images, sagittal and coronal 2D reformatted images were created and submitted for interpretation  Radiation dose length product (DLP) for this visit:  811 mGy-cm   This examination, like all CT scans performed in the [facility], was performed utilizing techniques to minimize radiation dose exposure, including the use of iterative reconstruction and automated exposure control  IMAGE QUALITY:  Diagnostic  FINDINGS: PARENCHYMA:  No intracranial mass, mass effect or midline shift  No CT signs of acute infarction  No acute parenchymal hemorrhage  VENTRICLES AND EXTRA-AXIAL SPACES:  Normal for the patient's age  VISUALIZED ORBITS AND PARANASAL SINUSES:  Unremarkable  CALVARIUM AND EXTRACRANIAL SOFT TISSUES:  Normal      Impression: No acute intracranial abnormality  Workstation performed: [id number]     Procedure: PE Study with CT Abdomen and Pelvis with contrast    Result Date: 3/15/2022  Narrative: CT PULMONARY ANGIOGRAM OF THE CHEST AND CT ABDOMEN AND PELVIS WITH INTRAVENOUS CONTRAST INDICATION:   cp, abd pain, n/v/d  High blood pressure COMPARISON:  None  TECHNIQUE:  CT examination of the chest, abdomen and pelvis was performed  Thin section CT angiographic technique was used in the chest in order to evaluate for pulmonary embolus and coronal 3D MIP postprocessing was performed on the acquisition scanner  Axial, sagittal, and coronal 2D reformatted images were created from the source data and submitted for interpretation  Some motion artifacts limit the examination  Radiation dose length product (DLP) for this visit:  7051 mGy-cm     This examination, like all CT scans performed in the [facility], was performed utilizing techniques to minimize radiation dose exposure, including the use of iterative reconstruction and automated exposure control  IV Contrast:  100 mL of iohexol (OMNIPAQUE) Enteric Contrast:  Enteric contrast was not administered  FINDINGS: CHEST PULMONARY ARTERIAL TREE:  No pulmonary embolus is seen  LUNGS:  Lungs are clear  Mild bronchial thickening is seen involving the lower lobe bronchi  [place] PLEURA:  Unremarkable  HEART/AORTA:  Heart is unremarkable for patient's age  No thoracic aortic aneurysm  Coronary bypass changes are identified  MEDIASTINUM AND GRISELDA:  Unremarkable  CHEST WALL AND LOWER NECK:   Unremarkable  ABDOMEN LIVER/BILIARY TREE:  Unremarkable  GALLBLADDER:  Gallbladder is surgically absent  Common bile duct is not dilated  SPLEEN:  Unremarkable  PANCREAS:  Unremarkable  ADRENAL GLANDS:  Unremarkable  KIDNEYS/URETERS:  Unremarkable  No hydronephrosis  STOMACH AND BOWEL:  Unremarkable  APPENDIX:  No findings to suggest appendicitis  ABDOMINOPELVIC CAVITY:  No ascites  No pneumoperitoneum  No lymphadenopathy  A few small periceliac nodes are identified  VESSELS:  Unremarkable for patient's age  PELVIS REPRODUCTIVE ORGANS:  Surgical changes of prior hysterectomy  URINARY BLADDER:  Unremarkable  ABDOMINAL WALL/INGUINAL REGIONS:  Epigastric hernia of fat is identified  Slight edema is noted within the hernia which may suggest some lymphatic congestion  OSSEOUS STRUCTURES:  No acute fracture or destructive osseous lesion  Some minimal rib deformities are seen left greater than right could represent old fractures  The patient is status post median sternotomy  Impression: No evidence of pulmonary embolus  The abdomen demonstrates no evidence of small bowel obstruction or inflammatory change  A large epigastric hernia is seen to the right of midline at the level of the liver  Bronchial thickening could be related to bronchitis  The study was marked in [facility] for immediate notification   Workstation performed: [id number]     EKG, Pathology, and Other Studies: I have personally reviewed pertinent reports

## 2022-04-28 ENCOUNTER — TELEPHONE (OUTPATIENT)
Dept: BARIATRICS | Facility: CLINIC | Age: 67
End: 2022-04-28

## 2022-05-05 ENCOUNTER — OFFICE VISIT (OUTPATIENT)
Dept: SURGERY | Facility: CLINIC | Age: 67
End: 2022-05-05
Payer: COMMERCIAL

## 2022-05-05 VITALS
WEIGHT: 217 LBS | TEMPERATURE: 98 F | DIASTOLIC BLOOD PRESSURE: 78 MMHG | HEIGHT: 59 IN | SYSTOLIC BLOOD PRESSURE: 120 MMHG | HEART RATE: 72 BPM | OXYGEN SATURATION: 96 % | BODY MASS INDEX: 43.75 KG/M2 | RESPIRATION RATE: 16 BRPM

## 2022-05-05 DIAGNOSIS — E66.01 MORBID OBESITY WITH BODY MASS INDEX (BMI) OF 45.0 TO 49.9 IN ADULT (HCC): ICD-10-CM

## 2022-05-05 DIAGNOSIS — K43.0 INCARCERATED INCISIONAL HERNIA: Primary | ICD-10-CM

## 2022-05-05 PROCEDURE — 99213 OFFICE O/P EST LOW 20 MIN: CPT | Performed by: SURGERY

## 2022-05-05 NOTE — PROGRESS NOTES
Follow Up - General Surgery   Kim Montaño 74 y o  female MRN: [id number]  Unit/Bed#:  Encounter: [id number]    Assessment/Plan     Assessment:  Incarcerated incisional hernia  History of CAD, MI, status post CABG  History of diabetes, under control  History of hypertension  Morbid obesity with BMI of 43 8  Plan:  Patient has lost approximately 5 lb since the last time we saw her in March 17 2022, patient missed her appointment with weight loss clinic last week  The patient was encouraged to be seen by weight loss clinic and I will see her in the office a month after that  She will get cardiac clearance in the meantime  History of Present Illness     HPI:  Kim Montaño is a 74 y o  female who presents to my office for follow-up  The patient is known to me from prior office visit for incisional hernia after open cholecystectomy  Patient does have some discomfort but denies having any nausea, vomiting, diarrhea, constipation or any other constitutional symptoms  Patient confess missing her appointment with weight loss clinic last week  She has been follow a diet from the Internet  She does walk approximately 10-15 minutes around her neighborhood every other day  She does get short of breath with walking  Review of Systems: The rest of the review of system total of 10 were negative except for the HPI  Historical Information   Past Medical History:   Diagnosis Date    [facility] rest [facility])     Arthritis       Back problem     Diabetes ([facility] )     Gall stones     Heart attack ([facility] )     High blood pressure     Stomach problems      Past Surgical History:   Procedure Laterality Date    CORONARY STENT PLACEMENT      x3    GALLBLADDER SURGERY      HYSTERECTOMY      KNEE SURGERY Right     OTHER SURGICAL HISTORY      Double Bypass      Social History   Social History     Substance and Sexual Activity   Alcohol Use No     Social History     Substance and Sexual Activity   Drug Use No     Social History     Tobacco Use   Smoking Status Former Smoker   Smokeless Tobacco Never Used     Family History: non-contributory    Meds/Allergies   all medications and allergies reviewed     Current Outpatient Medications:     albuterol (2 5 mg/3 mL) 0 083 % nebulizer solution, , Disp: , Rfl:     albuterol (PROAIR HFA) 90 mcg/act inhaler, USE 2 INHALATIONS FOUR TIMES A DAY, Disp: , Rfl:     ALPRAZolam (XANAX) 0 25 mg tablet, , Disp: , Rfl:     ascorbic Acid (VITAMIN C) 500 MG CPCR, Take 500 mg by mouth, Disp: , Rfl:     aspirin 81 MG tablet, Take by mouth, Disp: , Rfl:     atorvastatin (LIPITOR) 80 mg tablet, Take 80 mg by mouth, Disp: , Rfl:     calcium-vitamin D 250-100 MG-UNIT per tablet, Take 1 tablet by mouth 2 (two) times a day, Disp: , Rfl:     Cholecalciferol 25 MCG (1000 UT) capsule, Take 1,000 Units by mouth, Disp: , Rfl:     clopidogrel (PLAVIX) 75 mg tablet, TAKE 1 TABLET DAILY, Disp: , Rfl:     clotrimazole-betamethasone (LOTRISONE) 1-0 05 % cream, Apply topically 2 (two) times a day, Disp: 45 g, Rfl: 1    conjugated estrogens (PREMARIN) vaginal cream, Insert 0 5 g into the vagina 2 (two) times a week for 30 days, Disp: 42 5 g, Rfl: 1    cyanocobalamin (VITAMIN B-12) 100 mcg tablet, Take 1 tablet by mouth, Disp: , Rfl:     guaiFENesin (ROBITUSSIN) 100 MG/5ML oral liquid, Take 100 mg by mouth Three times daily as needed, Disp: , Rfl:     LORazepam (ATIVAN) 0 5 mg tablet, 1 TABLET TWICE DAILY, Disp: , Rfl:     losartan (COZAAR) 25 mg tablet, losartan 25 mg tablet, Disp: , Rfl:     methocarbamol (ROBAXIN) 500 mg tablet, Take 500 mg by mouth, Disp: , Rfl:     metoprolol succinate (TOPROL-XL) 25 mg 24 hr tablet, , Disp: , Rfl:     nitroglycerin (NITROSTAT) 0 4 mg SL tablet, Take 1 tablet by mouth Three times a day, Disp: , Rfl:     nystatin (nystatin) powder, Nystop 100,000 unit/gram topical powder, Disp: , Rfl:     Omega-3 1000 MG CAPS, Take by mouth, Disp: , Rfl:     PNEUMOVAX 23 25 MCG/0 5ML, TO BE ADMINISTERED BY PHARMACIST FOR IMMUNIZATION, Disp: , Rfl: 0    terconazole (TERAZOL 7) 0 4 % vaginal cream, Insert 1 applicator into the vagina daily at bedtime, Disp: 45 g, Rfl: 0    traMADol (ULTRAM) 50 mg tablet, Take 50 mg by mouth, Disp: , Rfl:     zolpidem (AMBIEN) 10 mg tablet, Take 10 mg by mouth daily, Disp: , Rfl:     acetaminophen (Tylenol) 325 mg tablet, every 6 (six) hours, Disp: , Rfl:     alendronate (FOSAMAX) 70 mg tablet, , Disp: , Rfl:     ALPRAZolam (XANAX) 0 5 mg tablet, Take 0 5 mg by mouth daily as needed, Disp: , Rfl:     ALPRAZolam (XANAX) 2 MG tablet, 1mg -2mg 3 times daily prn, Disp: , Rfl:     amLODIPine (NORVASC) 5 mg tablet, , Disp: , Rfl:     ezetimibe (Zetia) 10 mg tablet, None Entered, Disp: , Rfl:     fluticasone-salmeterol (Advair Diskus) 500-50 mcg/dose inhaler, None Entered, Disp: , Rfl:     lisinopril (ZESTRIL) 10 mg tablet, Take by mouth, Disp: , Rfl:     metoprolol succinate (TOPROL-XL) 50 mg 24 hr tablet, TAKE 1 TABLET DAILY, Disp: , Rfl:     montelukast (SINGULAIR) 10 mg tablet, Take 10 mg by mouth, Disp: , Rfl:     ofloxacin (OCUFLOX) 0 3 % ophthalmic solution, place 1 drop INTO AFFECTED EYE(S) four times a day as directed (S   (REFER TO PRESCRIPTION NOTES)  , Disp: , Rfl:     omeprazole (PriLOSEC) 20 mg delayed release capsule, Take 1 capsule (20 mg total) by mouth daily, Disp: 30 capsule, Rfl: 3    prednisoLONE acetate (PRED FORTE) 1 % ophthalmic suspension, place 1 drop INTO AFFECTED EYE(S) four times a day as directed (S   (REFER TO PRESCRIPTION NOTES)  , Disp: , Rfl:     sertraline (ZOLOFT) 50 mg tablet, Take 50 mg by mouth daily, Disp: , Rfl:     umeclidinium-vilanterol (Anoro Ellipta) 62 5-25 MCG/INH inhaler, Inhale 1 puff, Disp: , Rfl:   Allergies   Allergen Reactions    Wound Dressing Adhesive      Tape     Wound Dressings Rash     Swelling, red       Objective     Current Vitals:   Blood Pressure: 120/78 (05/05/22 0945)  Pulse: 72 (05/05/22 0945)  Temperature: 98 °F (36 7 °C) (05/05/22 0945)  Temp Source: Temporal (05/05/22 0945)  Respirations: 16 (05/05/22 0945)  Height: 4' 11" (149 9 cm) (05/05/22 0945)  Weight - Scale: 98 4 kg (217 lb) (05/05/22 0945)  SpO2: 96 % (05/05/22 0945)    Physical Exam  Vitals and nursing note reviewed  Constitutional:       General: She is not in acute distress  Appearance: She is obese  Cardiovascular:      Rate and Rhythm: Regular rhythm  Heart sounds: No murmur heard  Comments: Mid sternotomy surgical scar  Pulmonary:      Effort: Pulmonary effort is normal  No respiratory distress  Breath sounds: Normal breath sounds  Abdominal:      Comments: Abdomen is obese, soft, nondistended and mildly tender in the middle aspect of the right subcostal incision  Unable to palpate visceromegaly or mass palpable  Skin:     General: Skin is warm  Coloration: Skin is not jaundiced  Findings: No erythema or rash  Neurological:      Mental Status: She is alert and oriented to person, place, and time  Cranial Nerves: No cranial nerve deficit     Psychiatric:         Mood and Affect: Mood normal          Behavior: Behavior normal

## 2022-06-14 ENCOUNTER — TELEPHONE (OUTPATIENT)
Dept: SURGERY | Facility: CLINIC | Age: 67
End: 2022-06-14

## 2022-06-15 ENCOUNTER — TELEPHONE (OUTPATIENT)
Dept: GASTROENTEROLOGY | Facility: CLINIC | Age: 67
End: 2022-06-15

## 2022-06-15 NOTE — TELEPHONE ENCOUNTER
Dr Rick Joyner pt  Pt called, said she was in the ED Sunday and Monday for rectal bleeding, it has stopped now but she was told by her family doctor to call here to see what to do, she had a CT scan and blood work done

## 2022-07-18 ENCOUNTER — OFFICE VISIT (OUTPATIENT)
Dept: SURGERY | Facility: CLINIC | Age: 67
End: 2022-07-18
Payer: COMMERCIAL

## 2022-07-18 VITALS
DIASTOLIC BLOOD PRESSURE: 78 MMHG | OXYGEN SATURATION: 98 % | HEART RATE: 70 BPM | BODY MASS INDEX: 44.15 KG/M2 | HEIGHT: 59 IN | SYSTOLIC BLOOD PRESSURE: 124 MMHG | WEIGHT: 219 LBS

## 2022-07-18 DIAGNOSIS — K43.0 INCARCERATED INCISIONAL HERNIA: Primary | ICD-10-CM

## 2022-07-18 DIAGNOSIS — E66.01 MORBID OBESITY WITH BODY MASS INDEX (BMI) OF 45.0 TO 49.9 IN ADULT (HCC): ICD-10-CM

## 2022-07-18 PROCEDURE — 99213 OFFICE O/P EST LOW 20 MIN: CPT | Performed by: SURGERY

## 2022-07-18 NOTE — PROGRESS NOTES
Follow Up - General Surgery   Ben Beatty 69 y o  female MRN: [id number]  Unit/Bed#:  Encounter: [id number]    Assessment/Plan     Assessment:  Incarcerated subcostal incisional hernia  History of CAD  History of diabetes  History of hypertension  Morbid obesity BMI of 44 2  Plan:  Patient about the same, minimal complaints from the hernia, occasional sharp pains, not stopping her from doing her normal physical activities  Again discussed with patient about weight loss, encourage to see weight loss clinic before contemplating any surgical intervention  History of Present Illness     HPI:  Ben Beatty is a 69 y o  female who presents to my office accompanied by her granddaughter for follow-up  Patient stated having occasional sharp pains from the hernia, she is able to do her normal physical activities despite the pain  She denied having any nausea, vomiting, diarrhea, constipation or any other constitutional symptoms  I reviewed the CT scan of the chest, abdomen and pelvis performed in May of 2022 with patient and granddaughter  Review of Systems: The rest of the review of system total of 10 were negative except for the HPI  Historical Information   Past Medical History:   Diagnosis Date    [facility] rest [facility])     Arthritis       Back problem     Diabetes ([facility] )     Gall stones     [facility] attack ([facility] )     High blood pressure     Stomach problems      Past Surgical History:   Procedure Laterality Date    CORONARY STENT PLACEMENT      x3    GALLBLADDER SURGERY      HYSTERECTOMY      KNEE SURGERY Right     OTHER SURGICAL HISTORY      Double Bypass      Social History   Social History     Substance and Sexual Activity   Alcohol Use No     Social History     Substance and Sexual Activity   Drug Use No     Social History     Tobacco Use   Smoking Status Former Smoker   Smokeless Tobacco Never Used     Family History: non-contributory    Meds/Allergies   all medications and allergies reviewed Current Outpatient Medications:     acetaminophen (TYLENOL) 325 mg tablet, every 6 (six) hours, Disp: , Rfl:     albuterol (2 5 mg/3 mL) 0 083 % nebulizer solution, , Disp: , Rfl:     albuterol (PROVENTIL HFA,VENTOLIN HFA) 90 mcg/act inhaler, USE 2 INHALATIONS FOUR TIMES A DAY, Disp: , Rfl:     ALPRAZolam (XANAX) 0 25 mg tablet, , Disp: , Rfl:     amLODIPine (NORVASC) 5 mg tablet, , Disp: , Rfl:     ascorbic Acid (VITAMIN C) 500 MG CPCR, Take 500 mg by mouth, Disp: , Rfl:     aspirin 81 MG tablet, Take by mouth, Disp: , Rfl:     atorvastatin (LIPITOR) 80 mg tablet, Take 80 mg by mouth, Disp: , Rfl:     calcium-vitamin D 250-100 MG-UNIT per tablet, Take 1 tablet by mouth 2 (two) times a day, Disp: , Rfl:     Cholecalciferol 25 MCG (1000 UT) capsule, Take 1,000 Units by mouth, Disp: , Rfl:     clopidogrel (PLAVIX) 75 mg tablet, TAKE 1 TABLET DAILY, Disp: , Rfl:     clotrimazole-betamethasone (LOTRISONE) 1-0 05 % cream, Apply topically 2 (two) times a day, Disp: 45 g, Rfl: 1    cyanocobalamin (VITAMIN B-12) 100 mcg tablet, Take 1 tablet by mouth, Disp: , Rfl:     LORazepam (ATIVAN) 0 5 mg tablet, 1 TABLET TWICE DAILY, Disp: , Rfl:     losartan (COZAAR) 25 mg tablet, losartan 25 mg tablet, Disp: , Rfl:     metoprolol succinate (TOPROL-XL) 50 mg 24 hr tablet, TAKE 1 TABLET DAILY, Disp: , Rfl:     nitroglycerin (NITROSTAT) 0 4 mg SL tablet, Take 1 tablet by mouth Three times a day, Disp: , Rfl:     nystatin (MYCOSTATIN) powder, Nystop 100,000 unit/gram topical powder, Disp: , Rfl:     ofloxacin (OCUFLOX) 0 3 % ophthalmic solution, place 1 drop INTO AFFECTED EYE(S) four times a day as directed (S   (REFER TO PRESCRIPTION NOTES)  , Disp: , Rfl:     Omega-3 1000 MG CAPS, Take by mouth, Disp: , Rfl:     terconazole (TERAZOL 7) 0 4 % vaginal cream, Insert 1 applicator into the vagina daily at bedtime, Disp: 45 g, Rfl: 0    traMADol (ULTRAM) 50 mg tablet, Take 50 mg by mouth, Disp: , Rfl:    umeclidinium-vilanterol (Anoro Ellipta) 62 5-25 MCG/INH inhaler, Inhale 1 puff, Disp: , Rfl:     zolpidem (AMBIEN) 10 mg tablet, Take 10 mg by mouth daily, Disp: , Rfl:     alendronate (FOSAMAX) 70 mg tablet, , Disp: , Rfl:     ALPRAZolam (XANAX) 0 5 mg tablet, Take 0 5 mg by mouth daily as needed (Patient not taking: Reported on 7/18/2022), Disp: , Rfl:     ALPRAZolam (XANAX) 2 MG tablet, 1mg -2mg 3 times daily prn (Patient not taking: Reported on 7/18/2022), Disp: , Rfl:     conjugated estrogens (PREMARIN) vaginal cream, Insert 0 5 g into the vagina 2 (two) times a week for 30 days, Disp: 42 5 g, Rfl: 1    ezetimibe (ZETIA) 10 mg tablet, None Entered (Patient not taking: Reported on 7/18/2022), Disp: , Rfl:     fluticasone-salmeterol (Advair) 500-50 mcg/dose inhaler, None Entered (Patient not taking: Reported on 7/18/2022), Disp: , Rfl:     guaiFENesin (ROBITUSSIN) 100 MG/5ML oral liquid, Take 100 mg by mouth Three times daily as needed (Patient not taking: Reported on 7/18/2022), Disp: , Rfl:     lisinopril (ZESTRIL) 10 mg tablet, Take by mouth (Patient not taking: Reported on 7/18/2022), Disp: , Rfl:     methocarbamol (ROBAXIN) 500 mg tablet, Take 500 mg by mouth (Patient not taking: Reported on 7/18/2022), Disp: , Rfl:     metoprolol succinate (TOPROL-XL) 25 mg 24 hr tablet, , Disp: , Rfl:     montelukast (SINGULAIR) 10 mg tablet, Take 10 mg by mouth (Patient not taking: Reported on 7/18/2022), Disp: , Rfl:     omeprazole (PriLOSEC) 20 mg delayed release capsule, Take 1 capsule (20 mg total) by mouth daily (Patient not taking: Reported on 7/18/2022), Disp: 30 capsule, Rfl: 3    PNEUMOVAX 23 25 MCG/0 5ML, TO BE ADMINISTERED BY PHARMACIST FOR IMMUNIZATION (Patient not taking: Reported on 7/18/2022), Disp: , Rfl: 0    prednisoLONE acetate (PRED FORTE) 1 % ophthalmic suspension, place 1 drop INTO AFFECTED EYE(S) four times a day as directed (S   (REFER TO PRESCRIPTION NOTES)   (Patient not taking: Reported on 7/18/2022), Disp: , Rfl:     sertraline (ZOLOFT) 50 mg tablet, Take 50 mg by mouth daily, Disp: , Rfl:   Allergies   Allergen Reactions    Wound Dressing Adhesive      Tape     Wound Dressings Rash     Swelling, red       Objective     Current Vitals:   Blood Pressure: 124/78 (07/18/22 1059)  Pulse: 70 (07/18/22 1059)  Height: 4' 11" (149 9 cm) (07/18/22 1059)  Weight - Scale: 99 3 kg (219 lb) (07/18/22 1059)  SpO2: 98 % (07/18/22 1059)      Invasive Devices  Report    None                 Physical Exam  Vitals and nursing note reviewed  Constitutional:       General: She is not in acute distress  Appearance: She is obese  Cardiovascular:      Rate and Rhythm: Normal rate and regular rhythm  Pulmonary:      Effort: No respiratory distress  Breath sounds: Normal breath sounds  Abdominal:      Comments: Abdomen is obese, soft, nondistended and nontender, palpable lump on the right upper quadrant from incisional hernia  Skin:     General: Skin is warm  Coloration: Skin is not jaundiced  Findings: No erythema or rash  Neurological:      Mental Status: She is alert and oriented to person, place, and time  Cranial Nerves: No cranial nerve deficit     Psychiatric:         Mood and Affect: Mood normal          Behavior: Behavior normal

## 2022-08-29 ENCOUNTER — TELEPHONE (OUTPATIENT)
Dept: BARIATRICS | Facility: CLINIC | Age: 67
End: 2022-08-29

## 2022-09-28 ENCOUNTER — TELEPHONE (OUTPATIENT)
Dept: GASTROENTEROLOGY | Facility: CLINIC | Age: 67
End: 2022-09-28

## 2022-11-04 ENCOUNTER — APPOINTMENT (EMERGENCY)
Dept: RADIOLOGY | Facility: HOSPITAL | Age: 67
End: 2022-11-04

## 2022-11-04 ENCOUNTER — HOSPITAL ENCOUNTER (OUTPATIENT)
Facility: HOSPITAL | Age: 67
Setting detail: OBSERVATION
Discharge: HOME | End: 2022-11-07
Attending: EMERGENCY MEDICINE | Admitting: INTERNAL MEDICINE

## 2022-11-04 DIAGNOSIS — R07.9 CHEST PAIN, UNSPECIFIED TYPE: Primary | ICD-10-CM

## 2022-11-04 DIAGNOSIS — I20.9 ANGINA PECTORIS (HCC): ICD-10-CM

## 2022-11-04 LAB
2HR DELTA HS TROPONIN: 1 NG/L
4HR DELTA HS TROPONIN: 2 NG/L
ALBUMIN SERPL BCP-MCNC: 3.7 G/DL (ref 3.5–5)
ALP SERPL-CCNC: 104 U/L (ref 46–116)
ALT SERPL W P-5'-P-CCNC: 20 U/L (ref 12–78)
ANION GAP SERPL CALCULATED.3IONS-SCNC: 11 MMOL/L (ref 4–13)
AST SERPL W P-5'-P-CCNC: 19 U/L (ref 5–45)
BASOPHILS # BLD AUTO: 0.04 THOUSANDS/ÂΜL (ref 0–0.1)
BASOPHILS NFR BLD AUTO: 0 % (ref 0–1)
BILIRUB SERPL-MCNC: 0.47 MG/DL (ref 0.2–1)
BUN SERPL-MCNC: 16 MG/DL (ref 5–25)
CALCIUM SERPL-MCNC: 9.6 MG/DL (ref 8.3–10.1)
CARDIAC TROPONIN I PNL SERPL HS: 4 NG/L
CARDIAC TROPONIN I PNL SERPL HS: 5 NG/L
CARDIAC TROPONIN I PNL SERPL HS: 6 NG/L
CHLORIDE SERPL-SCNC: 100 MMOL/L (ref 96–108)
CO2 SERPL-SCNC: 26 MMOL/L (ref 21–32)
CREAT SERPL-MCNC: 0.94 MG/DL (ref 0.6–1.3)
EOSINOPHIL # BLD AUTO: 0.1 THOUSAND/ÂΜL (ref 0–0.61)
EOSINOPHIL NFR BLD AUTO: 1 % (ref 0–6)
ERYTHROCYTE [DISTWIDTH] IN BLOOD BY AUTOMATED COUNT: 12.6 % (ref 11.6–15.1)
GFR SERPL CREATININE-BSD FRML MDRD: 62 ML/MIN/1.73SQ M
GLUCOSE SERPL-MCNC: 125 MG/DL (ref 65–140)
HCT VFR BLD AUTO: 40.7 % (ref 34.8–46.1)
HGB BLD-MCNC: 13.5 G/DL (ref 11.5–15.4)
IMM GRANULOCYTES # BLD AUTO: 0.04 THOUSAND/UL (ref 0–0.2)
IMM GRANULOCYTES NFR BLD AUTO: 0 % (ref 0–2)
LYMPHOCYTES # BLD AUTO: 2.43 THOUSANDS/ÂΜL (ref 0.6–4.47)
LYMPHOCYTES NFR BLD AUTO: 18 % (ref 14–44)
MCH RBC QN AUTO: 31.5 PG (ref 26.8–34.3)
MCHC RBC AUTO-ENTMCNC: 33.2 G/DL (ref 31.4–37.4)
MCV RBC AUTO: 95 FL (ref 82–98)
MONOCYTES # BLD AUTO: 0.84 THOUSAND/ÂΜL (ref 0.17–1.22)
MONOCYTES NFR BLD AUTO: 6 % (ref 4–12)
NEUTROPHILS # BLD AUTO: 9.87 THOUSANDS/ÂΜL (ref 1.85–7.62)
NEUTS SEG NFR BLD AUTO: 75 % (ref 43–75)
NRBC BLD AUTO-RTO: 0 /100 WBCS
PLATELET # BLD AUTO: 309 THOUSANDS/UL (ref 149–390)
PMV BLD AUTO: 8.8 FL (ref 8.9–12.7)
POTASSIUM SERPL-SCNC: 3.8 MMOL/L (ref 3.5–5.3)
PROT SERPL-MCNC: 7.9 G/DL (ref 6.4–8.4)
RBC # BLD AUTO: 4.29 MILLION/UL (ref 3.81–5.12)
SODIUM SERPL-SCNC: 137 MMOL/L (ref 135–147)
WBC # BLD AUTO: 13.32 THOUSAND/UL (ref 4.31–10.16)

## 2022-11-04 RX ORDER — ASPIRIN 81 MG/1
324 TABLET, CHEWABLE ORAL ONCE
Status: COMPLETED | OUTPATIENT
Start: 2022-11-04 | End: 2022-11-04

## 2022-11-04 RX ORDER — METOCLOPRAMIDE HYDROCHLORIDE 5 MG/ML
10 INJECTION INTRAMUSCULAR; INTRAVENOUS ONCE
Status: COMPLETED | OUTPATIENT
Start: 2022-11-04 | End: 2022-11-04

## 2022-11-04 RX ORDER — DIPHENHYDRAMINE HYDROCHLORIDE 50 MG/ML
25 INJECTION INTRAMUSCULAR; INTRAVENOUS ONCE
Status: COMPLETED | OUTPATIENT
Start: 2022-11-04 | End: 2022-11-04

## 2022-11-04 RX ADMIN — METOCLOPRAMIDE 10 MG: 5 INJECTION, SOLUTION INTRAMUSCULAR; INTRAVENOUS at 20:34

## 2022-11-04 RX ADMIN — DIPHENHYDRAMINE HYDROCHLORIDE 25 MG: 50 INJECTION, SOLUTION INTRAMUSCULAR; INTRAVENOUS at 20:34

## 2022-11-04 RX ADMIN — ASPIRIN 324 MG: 81 TABLET, CHEWABLE ORAL at 20:29

## 2022-11-05 PROBLEM — R77.9 ELEVATED BLOOD PROTEIN: Status: RESOLVED | Noted: 2022-11-05 | Resolved: 2022-11-05

## 2022-11-05 PROBLEM — I10 HTN (HYPERTENSION): Status: ACTIVE | Noted: 2022-11-05

## 2022-11-05 PROBLEM — R77.9 ELEVATED BLOOD PROTEIN: Status: ACTIVE | Noted: 2022-11-05

## 2022-11-05 PROBLEM — R51.9 HEADACHE: Status: ACTIVE | Noted: 2022-11-05

## 2022-11-05 LAB
ATRIAL RATE: 250 BPM
ATRIAL RATE: 73 BPM
ERYTHROCYTE [SEDIMENTATION RATE] IN BLOOD: 40 MM/HOUR (ref 0–29)
P AXIS: 58 DEGREES
P AXIS: 66 DEGREES
PR INTERVAL: 194 MS
QRS AXIS: 43 DEGREES
QRS AXIS: 54 DEGREES
QRSD INTERVAL: 90 MS
QRSD INTERVAL: 92 MS
QT INTERVAL: 428 MS
QT INTERVAL: 440 MS
QTC INTERVAL: 471 MS
QTC INTERVAL: 471 MS
T WAVE AXIS: 76 DEGREES
T WAVE AXIS: 83 DEGREES
VENTRICULAR RATE: 69 BPM
VENTRICULAR RATE: 73 BPM

## 2022-11-05 RX ORDER — CLOPIDOGREL BISULFATE 75 MG/1
75 TABLET ORAL DAILY
Status: DISCONTINUED | OUTPATIENT
Start: 2022-11-05 | End: 2022-11-07 | Stop reason: HOSPADM

## 2022-11-05 RX ORDER — AMLODIPINE BESYLATE 5 MG/1
5 TABLET ORAL DAILY
Status: DISCONTINUED | OUTPATIENT
Start: 2022-11-05 | End: 2022-11-07 | Stop reason: HOSPADM

## 2022-11-05 RX ORDER — LIDOCAINE 50 MG/G
1 PATCH TOPICAL DAILY
Status: DISCONTINUED | OUTPATIENT
Start: 2022-11-05 | End: 2022-11-07 | Stop reason: HOSPADM

## 2022-11-05 RX ORDER — NITROGLYCERIN 0.4 MG/1
0.4 TABLET SUBLINGUAL
Status: DISCONTINUED | OUTPATIENT
Start: 2022-11-05 | End: 2022-11-07 | Stop reason: HOSPADM

## 2022-11-05 RX ORDER — ALPRAZOLAM 0.25 MG/1
0.25 TABLET ORAL
Status: DISCONTINUED | OUTPATIENT
Start: 2022-11-05 | End: 2022-11-07 | Stop reason: HOSPADM

## 2022-11-05 RX ORDER — LOSARTAN POTASSIUM 25 MG/1
25 TABLET ORAL DAILY
Status: DISCONTINUED | OUTPATIENT
Start: 2022-11-05 | End: 2022-11-07 | Stop reason: HOSPADM

## 2022-11-05 RX ORDER — ALBUTEROL SULFATE 2.5 MG/3ML
2.5 SOLUTION RESPIRATORY (INHALATION) EVERY 4 HOURS PRN
Status: DISCONTINUED | OUTPATIENT
Start: 2022-11-05 | End: 2022-11-07 | Stop reason: HOSPADM

## 2022-11-05 RX ORDER — MORPHINE SULFATE 4 MG/ML
4 INJECTION, SOLUTION INTRAMUSCULAR; INTRAVENOUS EVERY 4 HOURS PRN
Status: DISCONTINUED | OUTPATIENT
Start: 2022-11-05 | End: 2022-11-07 | Stop reason: HOSPADM

## 2022-11-05 RX ORDER — ACETAMINOPHEN 325 MG/1
650 TABLET ORAL EVERY 6 HOURS PRN
Status: DISCONTINUED | OUTPATIENT
Start: 2022-11-05 | End: 2022-11-07 | Stop reason: HOSPADM

## 2022-11-05 RX ORDER — ASPIRIN 81 MG/1
81 TABLET, CHEWABLE ORAL DAILY
Status: DISCONTINUED | OUTPATIENT
Start: 2022-11-05 | End: 2022-11-07 | Stop reason: HOSPADM

## 2022-11-05 RX ORDER — ZOLPIDEM TARTRATE 5 MG/1
10 TABLET ORAL
Status: DISCONTINUED | OUTPATIENT
Start: 2022-11-05 | End: 2022-11-07 | Stop reason: HOSPADM

## 2022-11-05 RX ORDER — ATORVASTATIN CALCIUM 40 MG/1
80 TABLET, FILM COATED ORAL
Status: DISCONTINUED | OUTPATIENT
Start: 2022-11-05 | End: 2022-11-07 | Stop reason: HOSPADM

## 2022-11-05 RX ORDER — METOPROLOL SUCCINATE 25 MG/1
25 TABLET, EXTENDED RELEASE ORAL DAILY
Status: DISCONTINUED | OUTPATIENT
Start: 2022-11-05 | End: 2022-11-07 | Stop reason: HOSPADM

## 2022-11-05 RX ORDER — CHLORAL HYDRATE 500 MG
1000 CAPSULE ORAL 3 TIMES DAILY
Status: DISCONTINUED | OUTPATIENT
Start: 2022-11-05 | End: 2022-11-07 | Stop reason: HOSPADM

## 2022-11-05 RX ORDER — ENOXAPARIN SODIUM 100 MG/ML
40 INJECTION SUBCUTANEOUS 2 TIMES DAILY
Status: DISCONTINUED | OUTPATIENT
Start: 2022-11-05 | End: 2022-11-07 | Stop reason: HOSPADM

## 2022-11-05 RX ORDER — ACETAMINOPHEN 325 MG/1
650 TABLET ORAL EVERY 6 HOURS PRN
Status: DISCONTINUED | OUTPATIENT
Start: 2022-11-05 | End: 2022-11-05

## 2022-11-05 RX ORDER — ONDANSETRON 2 MG/ML
4 INJECTION INTRAMUSCULAR; INTRAVENOUS EVERY 4 HOURS PRN
Status: DISCONTINUED | OUTPATIENT
Start: 2022-11-05 | End: 2022-11-07 | Stop reason: HOSPADM

## 2022-11-05 RX ADMIN — LOSARTAN POTASSIUM 25 MG: 25 TABLET, FILM COATED ORAL at 08:21

## 2022-11-05 RX ADMIN — NITROGLYCERIN 1 INCH: 20 OINTMENT TOPICAL at 00:59

## 2022-11-05 RX ADMIN — CLOPIDOGREL BISULFATE 75 MG: 75 TABLET ORAL at 08:21

## 2022-11-05 RX ADMIN — ENOXAPARIN SODIUM 40 MG: 40 INJECTION SUBCUTANEOUS at 08:20

## 2022-11-05 RX ADMIN — OMEGA-3 FATTY ACIDS CAP 1000 MG 1000 MG: 1000 CAP at 00:59

## 2022-11-05 RX ADMIN — ASPIRIN 81 MG: 81 TABLET, CHEWABLE ORAL at 08:21

## 2022-11-05 RX ADMIN — ZOLPIDEM TARTRATE 10 MG: 5 TABLET, COATED ORAL at 00:59

## 2022-11-05 RX ADMIN — OMEGA-3 FATTY ACIDS CAP 1000 MG 1000 MG: 1000 CAP at 16:14

## 2022-11-05 RX ADMIN — ENOXAPARIN SODIUM 40 MG: 40 INJECTION SUBCUTANEOUS at 18:34

## 2022-11-05 RX ADMIN — METOPROLOL SUCCINATE 25 MG: 25 TABLET, EXTENDED RELEASE ORAL at 08:20

## 2022-11-05 RX ADMIN — VITAM B12 100 MCG: 100 TAB at 08:21

## 2022-11-05 RX ADMIN — AMLODIPINE BESYLATE 5 MG: 5 TABLET ORAL at 08:21

## 2022-11-05 RX ADMIN — ATORVASTATIN CALCIUM 80 MG: 40 TABLET, FILM COATED ORAL at 16:14

## 2022-11-05 RX ADMIN — LIDOCAINE 5% 1 PATCH: 700 PATCH TOPICAL at 08:20

## 2022-11-05 RX ADMIN — ALPRAZOLAM 0.25 MG: 0.25 TABLET ORAL at 21:56

## 2022-11-05 RX ADMIN — MORPHINE SULFATE 4 MG: 4 INJECTION INTRAVENOUS at 19:52

## 2022-11-05 RX ADMIN — OMEGA-3 FATTY ACIDS CAP 1000 MG 1000 MG: 1000 CAP at 08:20

## 2022-11-05 RX ADMIN — MORPHINE SULFATE 2 MG: 2 INJECTION, SOLUTION INTRAMUSCULAR; INTRAVENOUS at 08:21

## 2022-11-05 NOTE — ASSESSMENT & PLAN NOTE
· Elevated BP on arrival 185/122  · Improved without intervention   · continue home amlodipine, losartan   · Monitor

## 2022-11-05 NOTE — H&P
[id number]  H&P- Jacki Shelby 1955, 69 y o  female MRN: [id number]  Unit/Bed#: ED 29 Encounter: [id number]  Primary Care Provider: Jc Alejo MD   Date and time admitted to hospital: 11/4/2022  5:39 PM    * Chest pain  Assessment & Plan  Hx of MI in 1998 with bypass, 3 stents placed since on 3 different occasions  Las stent placed in 2012  · KAY score 5  · EKG NSR 73  V1-V2 inversion, stable   • CXR unremarkable   • Troponin 6, delta negative; continue to trend with serial EKGs  • ASA 325mg x1  • Continue asa 81mg, statin   • Possible musculoskeletal vs pericarditis vs acs  • Check ESR, lidocaine patch  • Pain management  • Cardiology consult; recommendations appreciated  • Telemetry  • Cardiac diet; low sodium       Headache  Assessment & Plan  · Now resolved  · Prn tylenol     HTN (hypertension)  Assessment & Plan  · Elevated BP on arrival 185/122  · Improved without intervention   · continue home amlodipine, losartan   · Monitor     VTE Pharmacologic Prophylaxis:   Moderate Risk (Score 3-4) - Pharmacological DVT Prophylaxis Ordered: enoxaparin (Lovenox)  Code Status: Level 1 - Full Code   Discussion with family: Patient declined call to   Anticipated Length of Stay: Patient will be admitted on an observation basis with an anticipated length of stay of less than 2 midnights secondary to CP r/o ACS  Total Time for Visit, including Counseling / Coordination of Care: 60 minutes Greater than 50% of this total time spent on direct patient counseling and coordination of care  Chief Complaint:   Chief Complaint   Patient presents with   • Chest Pain     L chest pressure started today  Denies n/v/d  Reports sob and headache  Denies cough/fever  History of Present Illness:  Jacki Shelby is a 69 y o  female with a PMH of CAD s/p stents and bypass, CVA, HTN who presents with chest pain   Left sided-chest pressure intermittently for past 2 days, which became constant today  She developed stabbing pain today in addition to pressure that radiated to the back  No relief after taking nitro x 2  She had lightheadedness and palpations in ED  States she "felt like something bad was going to happen " Patient reports that she has been lifting/doing more laundry than usual  Has chronic shortness of breath which she thinks is worsening with age  It comes on during rest and with activity  No fever or chills  No hx of DVT  She is compliant with aspirin/plavix  She has been experiencing external stress due to social issues  Review of Systems:  A 10-point review of systems was obtained  Pertinent positives and negatives are outlined in the HPI above  Remainder of review of systems are otherwise negative  Past Medical and Surgical History:   Past Medical History:   Diagnosis Date   • Angina at rest [facility])    • Arthritis   • Back problem    • [facility] ([facility] )    • Gall stones    • Heart attack ([facility] )    • High blood pressure    • Stomach problems        Past Surgical History:   Procedure Laterality Date   • CORONARY STENT PLACEMENT      x3   • GALLBLADDER SURGERY     • HYSTERECTOMY     • KNEE SURGERY Right    • OTHER SURGICAL HISTORY      Double Bypass        Meds/Allergies:  Prior to Admission medications    Medication Sig Start Date End Date Taking?  Authorizing Provider   acetaminophen (TYLENOL) 325 mg tablet every 6 (six) hours    Historical Provider, MD   albuterol (2 5 mg/3 mL) 0 083 % nebulizer solution  3/10/22   Historical Provider, MD   ALPRAZolam Adolfo [place]) 0 25 mg tablet  3/10/22   Historical Provider, MD   amLODIPine (NORVASC) 5 mg tablet  3/17/22   Historical Provider, MD   aspirin 81 MG tablet Take by mouth    Historical Provider, MD   atorvastatin (LIPITOR) 80 mg tablet Take 80 mg by mouth 7/31/13   Historical Provider, MD   calcium-vitamin D 250-100 MG-UNIT per tablet Take 1 tablet by mouth 2 (two) times a day    Historical Provider, MD   clopidogrel (PLAVIX) 75 mg tablet TAKE 1 TABLET DAILY 11/21/17   Historical Provider, MD   clotrimazole-betamethasone (Melvin Reeks) 1-0 05 % cream Apply topically 2 (two) times a day 5/10/19   ANASTACIA Arriaga   conjugated estrogens (PREMARIN) vaginal cream Insert 0 5 g into the vagina 2 (two) times a week for 30 days 5/13/19 5/5/22  ANASTACIA Arriaga   cyanocobalamin (VITAMIN B-12) 100 mcg tablet Take 1 tablet by mouth    Historical Provider, MD   guaiFENesin (ROBITUSSIN) 100 MG/5ML oral liquid Take 100 mg by mouth Three times daily as needed  Patient not taking: Reported on 7/18/2022    Historical Provider, MD   losartan (COZAAR) 25 mg tablet losartan 25 mg tablet 11/18/21   Historical Provider, MD   metoprolol succinate (TOPROL-XL) 25 mg 24 hr tablet  1/19/22   Historical Provider, MD   nitroglycerin (NITROSTAT) 0 4 mg SL tablet Take 1 tablet by mouth Three times a day    Historical Provider, MD   [place]-3 1000 MG CAPS Take by mouth    Historical Provider, MD   PNEUMOVAX 23 25 MCG/0 5ML TO BE ADMINISTERED BY PHARMACIST FOR IMMUNIZATION  Patient not taking: Reported on 7/18/2022 11/5/18   Historical Provider, MD   terconazole (TERAZOL 7) 0 4 % vaginal cream Insert 1 applicator into the vagina daily at bedtime 5/13/19   ANASTACIA Arriaga   traMADol (ULTRAM) 50 mg tablet Take 50 mg by mouth 7/31/13   Historical Provider, MD   zolpidem (AMBIEN) 10 mg tablet Take 10 mg by mouth daily 7/31/13   Historical Provider, MD VINSON have reviewed home medications with patient personally  Allergies:    Allergies   Allergen Reactions   • Wound Dressing Adhesive      Tape    • Wound Dressings Rash     Swelling, red       Social History:  Marital Status: Single   Occupation:   Patient Pre-hospital Living Situation: Home  Patient Pre-hospital Level of Mobility: walks  Patient Pre-hospital Diet Restrictions:   Substance Use History:   Social History     Substance and Sexual Activity   Alcohol Use No     Social History     Tobacco Use Smoking Status Former Smoker   Smokeless Tobacco Never Used     Social History     Substance and Sexual Activity   Drug Use No       Family History:  Family History   Problem Relation Age of Onset   • Breast cancer Mother 46   • Colon cancer Father    • Breast cancer Maternal Aunt    • Ovarian cancer Maternal Aunt    • Breast cancer Maternal Aunt    • Cervical cancer Maternal Aunt    • Uterine cancer Maternal Aunt    • No Known Problems Sister    • No Known Problems Daughter    • No Known Problems Sister    • No Known Problems Sister        Physical Exam:     Vitals:   Blood Pressure: 129/67 (11/05/22 0600)  Pulse: 70 (11/05/22 0600)  Temperature: 97 5 °F (36 4 °C) (11/04/22 1730)  Temp Source: Tympanic (11/04/22 1730)  Respirations: 20 (11/05/22 0600)  Height: 4' 11" (149 9 cm) (11/04/22 2318)  Weight - Scale: 109 kg (240 lb) (11/04/22 2318)  SpO2: 94 % (11/05/22 0600)    Physical Exam  Vitals and nursing note reviewed  Constitutional:       General: She is not in acute distress  Appearance: She is well-developed  HENT:      Head: Normocephalic and atraumatic  Eyes:      Extraocular Movements: Extraocular movements intact  Conjunctiva/sclera: Conjunctivae normal       Pupils: Pupils are equal, round, and reactive to light  Cardiovascular:      Rate and Rhythm: Normal rate and regular rhythm  Heart sounds: No murmur heard  Pulmonary:      Effort: Pulmonary effort is normal  No respiratory distress  Breath sounds: Normal breath sounds  No wheezing  Abdominal:      General: Abdomen is flat  Bowel sounds are normal       Palpations: Abdomen is soft  Tenderness: There is no abdominal tenderness  Musculoskeletal:      Cervical back: Neck supple  Comments: +chest wall tenderness   Skin:     General: Skin is warm and dry  Neurological:      Mental Status: She is alert  Mental status is at baseline     Psychiatric:         Mood and Affect: Mood normal           Additional Data: Lab Results:  Results from last 7 days   Lab Units 11/04/22  1743   WBC Thousand/uL 13 32*   HEMOGLOBIN g/dL 13 5   HEMATOCRIT % 40 7   PLATELETS Thousands/uL 309   NEUTROS PCT % 75   LYMPHS PCT % 18   MONOS PCT % 6   EOS PCT % 1     Results from last 7 days   Lab Units 11/04/22  1743   SODIUM mmol/L 137   POTASSIUM mmol/L 3 8   CHLORIDE mmol/L 100   CO2 mmol/L 26   BUN mg/dL 16   CREATININE mg/dL 0 94   ANION GAP mmol/L 11   CALCIUM mg/dL 9 6   ALBUMIN g/dL 3 7   TOTAL BILIRUBIN mg/dL 0 47   ALK PHOS U/L 104   ALT U/L 20   AST U/L 19   GLUCOSE RANDOM mg/dL 125                       Imaging: Reviewed radiology reports from this admission including: chest xray  XR chest 2 views    (Results Pending)       EKG and Other Studies Reviewed on Admission:   · EKG: NSR  HR 73  V1-V2 inversion       ** Please Note: This note has been constructed using a voice recognition system   **

## 2022-11-05 NOTE — ASSESSMENT & PLAN NOTE
Hx of MI in 1998 with bypass, 3 stents placed since on 3 different occasions  Las stent placed in 2012  · KAY score 5  · EKG NSR 73   V1-V2 inversion, stable   • CXR unremarkable   • Troponin 6, delta negative; continue to trend with serial EKGs  • ASA 325mg x1  • Continue asa 81mg, statin   • Possible musculoskeletal vs pericarditis vs acs  • Check ESR, lidocaine patch  • Pain management  • Cardiology consult; recommendations appreciated  • Telemetry  • Cardiac diet; low sodium

## 2022-11-05 NOTE — ED PROVIDER NOTES
Pt Name: Dale Abreu  MRN: [id number]  [place] 1955  Age/Sex: 69 y o  female  Date of evaluation: 11/4/2022  PCP: Ramon Huizar MD    [address] [place] [address]    Chief Complaint   Patient presents with   • Chest Pain     L chest pressure started today  Denies n/v/d  Reports sob and headache  Denies cough/fever  HPI    69 y o  female presenting with left-sided chest pressure and pain  Patient states she began having pain in the center of the chest in left the chest intermittently over the past 1-2 days, it became much more severe today  The pain is dull, severe, feeling pressure, in the center of the chest as well as the left upper chest, not change with any clear provoking or palliating factors  She also complains of shortness of breath as well as a headache  The headache is dull, moderate intensity, in the left side of the head, radiating throughout the head, worse with lights or noise and better rest   She denies nausea, vomiting diarrhea, fevers, trauma, numbness, weakness, changes in speech or vision, other symptoms  HPI      Past Medical and Surgical History    Past Medical History:   Diagnosis Date   • Angina at rest [facility])    • Arthritis      • Back problem    • Diabetes ([facility] [address] )    • Gall stones    • Heart attack ([facility] [address] )    • High blood pressure    • Stomach problems        Past Surgical History:   Procedure Laterality Date   • CORONARY STENT PLACEMENT      x3   • GALLBLADDER SURGERY     • HYSTERECTOMY     • KNEE SURGERY Right    • OTHER SURGICAL HISTORY      Double Bypass        Family History   Problem Relation Age of Onset   • Breast cancer Mother 46   • Colon cancer Father    • Breast cancer Maternal Aunt    • Ovarian cancer Maternal Aunt    • Breast cancer Maternal Aunt    • Cervical cancer Maternal Aunt    • Uterine cancer Maternal Aunt    • No Known Problems Sister    • No Known Problems Daughter    • No Known Problems Sister    • No Known Problems Sister        Social History     Tobacco Use   • Smoking status: Former Smoker   • Smokeless tobacco: Never Used   Substance Use Topics   • Alcohol use: No   • Drug use: No           Allergies    Allergies   Allergen Reactions   • Wound Dressing Adhesive      Tape    • Wound Dressings Rash     Swelling, red       Home Medications    Prior to Admission medications    Medication Sig Start Date End Date Taking?  Authorizing Provider   acetaminophen (TYLENOL) 325 mg tablet every 6 (six) hours    Historical Provider, MD   albuterol (2 5 mg/3 mL) 0 083 % nebulizer solution  3/10/22   Historical Provider, MD   ALPRAZolam Thompson ) 0 25 mg tablet  3/10/22   Historical Provider, MD   amLODIPine (NORVASC) 5 mg tablet  3/17/22   Historical Provider, MD   aspirin 81 MG tablet Take by mouth    Historical Provider, MD   atorvastatin (LIPITOR) 80 mg tablet Take 80 mg by mouth 7/31/13   Historical Provider, MD   calcium-vitamin D 250-100 MG-UNIT per tablet Take 1 tablet by mouth 2 (two) times a day    Historical Provider, MD   clopidogrel (PLAVIX) 75 mg tablet TAKE 1 TABLET DAILY 11/21/17   Historical Provider, MD   clotrimazole-betamethasone (Yazmin Marker) 1-0 05 % cream Apply topically 2 (two) times a day 5/10/19   ANASTACIA Arriaga   conjugated estrogens (PREMARIN) vaginal cream Insert 0 5 g into the vagina 2 (two) times a week for 30 days 5/13/19 5/5/22  ANASTACIA Arriaga   cyanocobalamin (VITAMIN B-12) 100 mcg tablet Take 1 tablet by mouth    Historical Provider, MD   guaiFENesin (ROBITUSSIN) 100 MG/5ML oral liquid Take 100 mg by mouth Three times daily as needed  Patient not taking: Reported on 7/18/2022    Historical Provider, MD   losartan (COZAAR) 25 mg tablet losartan 25 mg tablet 11/18/21   Historical Provider, MD   metoprolol succinate (TOPROL-XL) 25 mg 24 hr tablet  1/19/22   Historical Provider, MD   nitroglycerin (NITROSTAT) 0 4 mg SL tablet Take 1 tablet by mouth Three times a day    Historical Provider, MD   [place]-3 1000 MG CAPS Take by mouth Historical Provider, MD   PNEUMOVAX 23 25 MCG/0 5ML TO BE ADMINISTERED BY PHARMACIST FOR IMMUNIZATION  Patient not taking: Reported on 7/18/2022 11/5/18   Historical Provider, MD   terconazole (TERAZOL 7) 0 4 % vaginal cream Insert 1 applicator into the vagina daily at bedtime 5/13/19   ANASTACIA Arriaga   traMADol (ULTRAM) 50 mg tablet Take 50 mg by mouth 7/31/13   Historical Provider, MD   zolpidem (AMBIEN) 10 mg tablet Take 10 mg by mouth daily 7/31/13   Historical Provider, MD           Review of Systems    Review of Systems   Constitutional: Negative for activity change, chills and fever  HENT: Negative for drooling and facial swelling  Eyes: Negative for pain, discharge and visual disturbance  Respiratory: Positive for shortness of breath  Negative for apnea, cough, chest tightness and wheezing  Cardiovascular: Positive for chest pain  Negative for leg swelling  Gastrointestinal: Negative for abdominal pain, constipation, diarrhea, nausea and vomiting  Genitourinary: Negative for difficulty urinating, dysuria and urgency  Musculoskeletal: Negative for arthralgias, back pain and gait problem  Skin: Negative for color change and rash  Neurological: Positive for headaches  Negative for dizziness, speech difficulty and weakness  Psychiatric/Behavioral: Negative for agitation, behavioral problems and confusion  All other systems reviewed and negative  Physical Exam      ED Triage Vitals [11/04/22 1727]   Temperature Pulse Respirations Blood Pressure SpO2   99 3 °F (37 4 °C) 101 20 (!) 185/122 98 %      Temp Source Heart Rate Source Patient Position - Orthostatic VS BP Location FiO2 (%)   Tympanic Monitor Sitting Left arm --      Pain Score       --               Physical Exam  Vitals and nursing note reviewed  Constitutional:       General: She is not in acute distress  Appearance: She is well-developed  She is not ill-appearing, toxic-appearing or diaphoretic     HENT: Head: Normocephalic and atraumatic  Right Ear: External ear normal       Left Ear: External ear normal       Nose: Nose normal       Mouth/Throat:      Mouth: Mucous membranes are moist       Pharynx: Oropharynx is clear  Eyes:      Conjunctiva/sclera: Conjunctivae normal       Pupils: Pupils are equal, round, and reactive to light  Cardiovascular:      Rate and Rhythm: Normal rate and regular rhythm  Pulses: Normal pulses  Heart sounds: Normal heart sounds  No murmur heard  No friction rub  No gallop  Pulmonary:      Effort: Pulmonary effort is normal  No respiratory distress  Breath sounds: Normal breath sounds  No wheezing or rales  Abdominal:      General: There is no distension  Palpations: Abdomen is soft  Tenderness: There is no abdominal tenderness  There is no guarding or rebound  Musculoskeletal:         General: No deformity  Normal range of motion  Cervical back: Normal range of motion and neck supple  Skin:     General: Skin is warm and dry  Capillary Refill: Capillary refill takes less than 2 seconds  Findings: No erythema or rash  Neurological:      Mental Status: She is alert and oriented to person, place, and time  Cranial Nerves: No cranial nerve deficit  Sensory: No sensory deficit  Motor: No weakness  Coordination: Coordination normal       Gait: Gait normal    Psychiatric:         Behavior: Behavior normal          Thought Content: Thought content normal          Judgment: Judgment normal               Diagnostic Results    EKG Interpretation    Rate:  73  BPM  Rhythm:  Normal Sinus Rhythm   Axis:  Normal   Intervals: Normal, no blocks, QTc  471 ms  Q waves:  Q-waves in septal leads  T waves:   Inverted V1 and V2  ST segments:  No significant elevations or depressions     Impression:  Normal sinus rhythm with nonspecific T-wave changes as well as old-appearing septal infarct but without evidence of STEMI or significant arrhythmia      EKG for comparison:  EKG dated 15 March 2022 similar in character no major changes other than mild changes in T-wave morphology  EKG interpreted by me       Labs:    Results Reviewed     Procedure Component Value Units Date/Time    HS Troponin I 4hr [[id number]]  (Normal) Collected: 11/04/22 2204    Lab Status: Final result Specimen: Blood from Arm, Left Updated: 11/04/22 2242     hs TnI 4hr 6 ng/L      Delta 4hr hsTnI 2 ng/L     HS Troponin I 2hr [[id number]]  (Normal) Collected: 11/04/22 2010    Lab Status: Final result Specimen: Blood from Arm, Left Updated: 11/04/22 2115     hs TnI 2hr 5 ng/L      Delta 2hr hsTnI 1 ng/L     HS Troponin 0hr (reflex protocol) [[id number]]  (Normal) Collected: 11/04/22 1743    Lab Status: Final result Specimen: Blood from Arm, Right Updated: 11/04/22 1819     hs TnI 0hr 4 ng/L     Comprehensive metabolic panel [[id number]] Collected: 11/04/22 1743    Lab Status: Final result Specimen: Blood from Arm, Right Updated: 11/04/22 1806     Sodium 137 mmol/L      Potassium 3 8 mmol/L      Chloride 100 mmol/L      CO2 26 mmol/L      ANION GAP 11 mmol/L      BUN 16 mg/dL      Creatinine 0 94 mg/dL      Glucose 125 mg/dL      Calcium 9 6 mg/dL      AST 19 U/L      ALT 20 U/L      Alkaline Phosphatase 104 U/L      Total Protein 7 9 g/dL      Albumin 3 7 g/dL      Total Bilirubin 0 47 mg/dL      eGFR 62 ml/min/1 73sq m     Narrative:      Mary guidelines for Chronic Kidney Disease (CKD):   •  Stage 1 with normal or high GFR (GFR > 90 mL/min/1 73 square meters)  •  Stage 2 Mild CKD (GFR = 60-89 mL/min/1 73 square meters)  •  Stage 3A Moderate CKD (GFR = 45-59 mL/min/1 73 square meters)  •  Stage 3B Moderate CKD (GFR = 30-44 mL/min/1 73 square meters)  •  Stage 4 Severe CKD (GFR = 15-29 mL/min/1 73 square meters)  •  Stage 5 End Stage CKD (GFR <15 mL/min/1 73 square meters)  Note: GFR calculation is accurate only with a steady state creatinine    CBC and differential [[id number]]  (Abnormal) Collected: 11/04/22 1743    Lab Status: Final result Specimen: Blood from Arm, Right Updated: 11/04/22 1748     WBC 13 32 Thousand/uL      RBC 4 29 Million/uL      Hemoglobin 13 5 g/dL      Hematocrit 40 7 %      MCV 95 fL      MCH 31 5 pg      MCHC 33 2 g/dL      RDW 12 6 %      MPV 8 8 fL      Platelets 105 Thousands/uL      nRBC 0 /100 WBCs      Neutrophils Relative 75 %      Immat GRANS % 0 %      Lymphocytes Relative 18 %      Monocytes Relative 6 %      Eosinophils Relative 1 %      Basophils Relative 0 %      Neutrophils Absolute 9 87 Thousands/µL      Immature Grans Absolute 0 04 Thousand/uL      Lymphocytes Absolute 2 43 Thousands/µL      Monocytes Absolute 0 84 Thousand/µL      Eosinophils Absolute 0 10 Thousand/µL      Basophils Absolute 0 04 Thousands/µL           All labs reviewed and utilized in the medical decision making process    Radiology:    XR chest 2 views    (Results Pending)       All radiology studies independently viewed by me and interpreted by the radiologist     Procedure    Procedures        ED Course of Care and Re-Assessments      Patient given aspirin due to chest pain, headache improved with Reglan and Benadryl  Medications   aspirin chewable tablet 324 mg (324 mg Oral Given 11/4/22 2029)   metoclopramide (REGLAN) injection 10 mg (10 mg Intravenous Given 11/4/22 2034)   diphenhydrAMINE (BENADRYL) injection 25 mg (25 mg Intravenous Given 11/4/22 2034)           FINAL IMPRESSION    Final diagnoses:   Chest pain, unspecified type         DISPOSITION/PLAN    Presentation as above with chest pain in the setting of significant cardiac history to include stents and cardiac bypass  Vital signs remarkable for significant hypertension, examination nonspecific with nonfocal neurologic exam   Very low suspicion for intracranial hemorrhage, sepsis, meningitis, encephalitis, aortic dissection    Some concern for ACS based on significant history as well as similar current symptoms with index chest pain  Heart score 7  Admitted Internal Medicine for further care and observation, hemodynamically stable and comfortable at time of admit  Time reflects when diagnosis was documented in both MDM as applicable and the Disposition within this note     Time User Action Codes Description Comment    11/4/2022 10:17 PM Robyn WICK Add [R07 9] Chest pain, unspecified type       ED Disposition     ED Disposition   Admit    Condition   Stable    Date/Time   Fri Nov 4, 2022 10:17 PM    Comment   Case was discussed with LILLIANA and the patient's admission status was agreed to be Admission Status: observation status to the service of Dr Evelina Reis  Follow-up Information    None           PATIENT REFERRED TO:    No follow-up provider specified  DISCHARGE MEDICATIONS:    Patient's Medications   Discharge Prescriptions    No medications on file       No discharge procedures on file  Amber Caro MD    Portions of the record may have been created with voice recognition software  Occasional wrong word or "sound alike" substitutions may have occurred due to the inherent limitations of voice recognition software    Please read the chart carefully and recognize, using context, where substitutions have occurred     Amber Caro MD  11/04/22 8271 Vital Signs Last 24 Hrs  T(C): 37, Max: 37 (06-18 @ 14:43)  T(F): 98.6, Max: 98.6 (06-18 @ 14:43)  HR: 61 (61 - 72)  BP: 121/74 (115/74 - 121/74)  BP(mean): --  RR: 16 (16 - 16)  SpO2: 100% (95% - 100%)

## 2022-11-05 NOTE — ED NOTES
Patient has been resting comfortably throughout the night, no c/o pain or discomfort, awaiting room assignment       Rafy Velasquez RN  11/05/22 3493

## 2022-11-05 NOTE — CONSULTS
Consultation - Cardiology   Nino Parham 69 y o  female MRN: [id number]  Unit/Bed#: ED 29 Encounter: [id number]  11/05/22  12:01 PM    Assessment/ Plan:  1  Chest pain unspecified  • Troponins negative x3  • EKG reveals normal sinus rhythm  • Echo ordered  • Continue aspirin 81 mg, atorvastatin 80 mg, and Toprol 25 mg  • Plan for nuclear stress test on Monday  • Telemetry monitoring    2  Coronary artery disease s/p CABG and PCI  • S/p CABG x2 (1997)  • S/p PCI x3 (March 2004)  • S/p PCI SVG to OM (05/27/2021)  • Continue aspirin 81 mg, Plavix 75 mg, atorvastatin 80 mg, Toprol 25 mg, and losartan 25 mg    3  Hypertension  • Currently 127/60; continue to monitor  • Continue amlodipine 5 mg, Toprol 25 mg, and losartan 25 mg    4  Mixed hyperlipidemia  • Continue atorvastatin 80 mg    5  2 silent strokes noted on brain imaging  • Management per Internal Medicine      History of Present Illness   Physician Requesting Consult: Sakina White MD    Reason for Consult / Principal Problem:  Chest pain    HPI: Nino Parham is a 69y o  year old female with history of CAD s/p CABG and PCI, HTN, HLD, and 2 silent strokes noted on brain imaging who presents with chest pain  Left-sided chest pressure has been intermittent over the last 2 days, however recently became constant and sharp in nature with radiation to her back which prompted patient to visit the ED  Patient received 2 doses of nitroglycerin in the ED without relief  Lightheadedness and palpitations were endorsed in the ED  Patient reports an increase in exercise recently without symptoms prior to these last 2 days  Reports chronic shortness of breath with both exertion and rest   Patient reports compliance with aspirin/Plavix  Also notably she has been under more external stress due to social issues  Consults    EKG:  Normal sinus rhythm      Review of Systems   Constitutional: Negative for chills, diaphoresis and fever     HENT: Negative for ear pain and sore throat  Eyes: Negative for pain and visual disturbance  Respiratory: Positive for chest tightness and shortness of breath (Chronic)  Negative for cough  Cardiovascular: Positive for chest pain and palpitations (Reported in ED)  Negative for leg swelling  Gastrointestinal: Negative for abdominal pain and vomiting  Genitourinary: Negative for dysuria and hematuria  Musculoskeletal: Negative for arthralgias and back pain  Skin: Negative for color change and rash  Neurological: Positive for light-headedness  Negative for dizziness, seizures and syncope  Psychiatric/Behavioral: Negative for agitation and confusion  All other systems reviewed and are negative  Historical Information   Past Medical History:   Diagnosis Date   • Angina at rest [facility])    • Arthritis      • Back problem    • Diabetes ([facility] [address] )    • Gall stones    • [facility] attack [facility]    • High blood pressure    • Stomach problems      Past Surgical History:   Procedure Laterality Date   • CORONARY STENT PLACEMENT      x3   • GALLBLADDER SURGERY     • HYSTERECTOMY     • KNEE SURGERY Right    • OTHER SURGICAL HISTORY      Double Bypass      Social History     Substance and Sexual Activity   Alcohol Use No     Social History     Substance and Sexual Activity   Drug Use No     Social History     Tobacco Use   Smoking Status Former Smoker   Smokeless Tobacco Never Used       Family History:   Family History   Problem Relation Age of Onset   • Breast cancer Mother 46   • Colon cancer Father    • Breast cancer Maternal Aunt    • Ovarian cancer Maternal Aunt    • Breast cancer Maternal Aunt    • Cervical cancer Maternal Aunt    • Uterine cancer Maternal Aunt    • No Known Problems Sister    • No Known Problems Daughter    • No Known Problems Sister    • No Known Problems Sister        Meds/Allergies   all current active meds have been reviewed  Allergies   Allergen Reactions   • Wound Dressing Adhesive      Tape    • Wound Dressings Rash Swelling, red       Objective   Vitals: Blood pressure 127/60, pulse 59, temperature 97 5 °F (36 4 °C), temperature source Tympanic, resp  rate 22, height 4' 11" (1 499 m), weight 109 kg (240 lb), SpO2 95 %, not currently breastfeeding , Body mass index is 48 47 kg/m² ,   Orthostatic Blood Pressures    Flowsheet Row Most Recent Value   Blood Pressure 127/60 filed at 11/05/2022 1028   Patient Position - Orthostatic VS Lying filed at 11/05/2022 1327          Systolic (49UMZ), ZOV:422 , Min:117 , MMF:459     Diastolic (79BSZ), AOS:19, Min:57, Max:122      No intake or output data in the 24 hours ending 11/05/22 1201    Invasive Devices  Report    Peripheral Intravenous Line  Duration           Peripheral IV 11/04/22 Left Antecubital <1 day                    Physical Exam:  Physical Exam  Vitals and nursing note reviewed  Constitutional:       General: She is not in acute distress  Appearance: She is well-developed  She is not toxic-appearing or diaphoretic  HENT:      Head: Normocephalic and atraumatic  Nose: Nose normal    Eyes:      Conjunctiva/sclera: Conjunctivae normal    Cardiovascular:      Rate and Rhythm: Normal rate and regular rhythm  Pulses: Normal pulses  Heart sounds: Normal heart sounds  No murmur heard  No gallop  Pulmonary:      Effort: Pulmonary effort is normal  No respiratory distress  Breath sounds: Normal breath sounds  No wheezing or rales  Chest:      Chest wall: No tenderness  Abdominal:      Palpations: Abdomen is soft  Tenderness: There is no abdominal tenderness  Musculoskeletal:      Cervical back: Neck supple  Right lower leg: No edema  Left lower leg: No edema  Skin:     General: Skin is warm and dry  Neurological:      Mental Status: She is alert and oriented to person, place, and time     Psychiatric:         Mood and Affect: Mood normal          Behavior: Behavior normal           Lab Results:     Cardiac Profile:   Results from last 7 days   Lab Units 11/04/22 2204 11/04/22 2010 11/04/22  1743   HS TNI 0HR ng/L  --   --  4   HS TNI 2HR ng/L  --  5  --    HSTNI D2 ng/L  --  1  --    HS TNI 4HR ng/L 6  --   --    HSTNI D4 ng/L 2  --   --        CBC with diff:   Results from last 7 days   Lab Units 11/04/22  1743   WBC Thousand/uL 13 32*   HEMOGLOBIN g/dL 13 5   HEMATOCRIT % 40 7   MCV fL 95   PLATELETS Thousands/uL 309   MCH pg 31 5   MCHC g/dL 33 2   RDW % 12 6   MPV fL 8 8*   NRBC AUTO /100 WBCs 0         CMP:   Results from last 7 days   Lab Units 11/04/22  1743   POTASSIUM mmol/L 3 8   CHLORIDE mmol/L 100   CO2 mmol/L 26   BUN mg/dL 16   CREATININE mg/dL 0 94   CALCIUM mg/dL 9 6   AST U/L 19   ALT U/L 20   ALK PHOS U/L 104   EGFR ml/min/1 73sq m 62

## 2022-11-06 ENCOUNTER — APPOINTMENT (OUTPATIENT)
Dept: NON INVASIVE DIAGNOSTICS | Facility: HOSPITAL | Age: 67
End: 2022-11-06

## 2022-11-06 LAB
AORTIC ROOT: 3.4 CM
APICAL FOUR CHAMBER EJECTION FRACTION: 63 %
ASCENDING AORTA: 3.2 CM
BASOPHILS # BLD AUTO: 0.05 THOUSANDS/ÂΜL (ref 0–0.1)
BASOPHILS NFR BLD AUTO: 1 % (ref 0–1)
E WAVE DECELERATION TIME: 248 MS
EOSINOPHIL # BLD AUTO: 0.17 THOUSAND/ÂΜL (ref 0–0.61)
EOSINOPHIL NFR BLD AUTO: 2 % (ref 0–6)
ERYTHROCYTE [DISTWIDTH] IN BLOOD BY AUTOMATED COUNT: 12.8 % (ref 11.6–15.1)
FRACTIONAL SHORTENING: 27 % (ref 28–44)
HCT VFR BLD AUTO: 41.7 % (ref 34.8–46.1)
HGB BLD-MCNC: 13.6 G/DL (ref 11.5–15.4)
IMM GRANULOCYTES # BLD AUTO: 0.04 THOUSAND/UL (ref 0–0.2)
IMM GRANULOCYTES NFR BLD AUTO: 0 % (ref 0–2)
INTERVENTRICULAR SEPTUM IN DIASTOLE (PARASTERNAL SHORT AXIS VIEW): 1.3 CM
INTERVENTRICULAR SEPTUM: 1.3 CM (ref 0.6–1.1)
LAAS-AP2: 19.1 CM2
LAAS-AP4: 16.5 CM2
LEFT ATRIUM SIZE: 3.9 CM
LEFT INTERNAL DIMENSION IN SYSTOLE: 3.5 CM (ref 2.1–4)
LEFT VENTRICULAR INTERNAL DIMENSION IN DIASTOLE: 4.8 CM (ref 3.5–6)
LEFT VENTRICULAR POSTERIOR WALL IN END DIASTOLE: 1.3 CM
LEFT VENTRICULAR STROKE VOLUME: 59 ML
LVSV (TEICH): 59 ML
LYMPHOCYTES # BLD AUTO: 2.9 THOUSANDS/ÂΜL (ref 0.6–4.47)
LYMPHOCYTES NFR BLD AUTO: 27 % (ref 14–44)
MCH RBC QN AUTO: 31.5 PG (ref 26.8–34.3)
MCHC RBC AUTO-ENTMCNC: 32.6 G/DL (ref 31.4–37.4)
MCV RBC AUTO: 97 FL (ref 82–98)
MONOCYTES # BLD AUTO: 0.93 THOUSAND/ÂΜL (ref 0.17–1.22)
MONOCYTES NFR BLD AUTO: 9 % (ref 4–12)
MV E'TISSUE VEL-SEP: 10 CM/S
MV PEAK A VEL: 0.91 M/S
MV PEAK E VEL: 85 CM/S
MV STENOSIS PRESSURE HALF TIME: 72 MS
MV VALVE AREA P 1/2 METHOD: 3.06 CM2
NEUTROPHILS # BLD AUTO: 6.67 THOUSANDS/ÂΜL (ref 1.85–7.62)
NEUTS SEG NFR BLD AUTO: 61 % (ref 43–75)
NRBC BLD AUTO-RTO: 0 /100 WBCS
PLATELET # BLD AUTO: 300 THOUSANDS/UL (ref 149–390)
PMV BLD AUTO: 9.2 FL (ref 8.9–12.7)
RBC # BLD AUTO: 4.32 MILLION/UL (ref 3.81–5.12)
RIGHT ATRIAL 2D VOLUME: 22 ML
RIGHT ATRIUM AREA SYSTOLE A4C: 10.9 CM2
RIGHT VENTRICLE ID DIMENSION: 3.2 CM
SL CV LEFT ATRIUM LENGTH A2C: 5 CM
SL CV LV EF: 60
SL CV PED ECHO LEFT VENTRICLE DIASTOLIC VOLUME (MOD BIPLANE) 2D: 108 ML
SL CV PED ECHO LEFT VENTRICLE SYSTOLIC VOLUME (MOD BIPLANE) 2D: 49 ML
TR MAX PG: 29 MMHG
TR PEAK VELOCITY: 2.7 M/S
TRICUSPID VALVE PEAK REGURGITATION VELOCITY: 2.71 M/S
WBC # BLD AUTO: 10.76 THOUSAND/UL (ref 4.31–10.16)

## 2022-11-06 RX ADMIN — ZOLPIDEM TARTRATE 10 MG: 5 TABLET, COATED ORAL at 00:04

## 2022-11-06 RX ADMIN — OMEGA-3 FATTY ACIDS CAP 1000 MG 1000 MG: 1000 CAP at 10:20

## 2022-11-06 RX ADMIN — METOPROLOL SUCCINATE 25 MG: 25 TABLET, EXTENDED RELEASE ORAL at 10:22

## 2022-11-06 RX ADMIN — AMLODIPINE BESYLATE 5 MG: 5 TABLET ORAL at 10:20

## 2022-11-06 RX ADMIN — ATORVASTATIN CALCIUM 80 MG: 40 TABLET, FILM COATED ORAL at 17:41

## 2022-11-06 RX ADMIN — ENOXAPARIN SODIUM 40 MG: 40 INJECTION SUBCUTANEOUS at 10:21

## 2022-11-06 RX ADMIN — VITAM B12 100 MCG: 100 TAB at 10:20

## 2022-11-06 RX ADMIN — LIDOCAINE 5% 1 PATCH: 700 PATCH TOPICAL at 10:21

## 2022-11-06 RX ADMIN — ENOXAPARIN SODIUM 40 MG: 40 INJECTION SUBCUTANEOUS at 17:41

## 2022-11-06 RX ADMIN — ASPIRIN 81 MG: 81 TABLET, CHEWABLE ORAL at 10:20

## 2022-11-06 RX ADMIN — LOSARTAN POTASSIUM 25 MG: 25 TABLET, FILM COATED ORAL at 10:20

## 2022-11-06 RX ADMIN — CLOPIDOGREL BISULFATE 75 MG: 75 TABLET ORAL at 10:20

## 2022-11-06 RX ADMIN — ZOLPIDEM TARTRATE 10 MG: 5 TABLET, COATED ORAL at 23:28

## 2022-11-06 NOTE — PLAN OF CARE
Problem: MOBILITY - ADULT  Goal: Maintain or return to baseline ADL function  Description: INTERVENTIONS:  -  Assess patient's ability to carry out ADLs; assess patient's baseline for ADL function and identify physical deficits which impact ability to perform ADLs (bathing, care of mouth/teeth, toileting, grooming, dressing, etc )  - Assess/evaluate cause of self-care deficits   - Assess range of motion  - Assess patient's mobility; develop plan if impaired  - Assess patient's need for assistive devices and provide as appropriate  - Encourage maximum independence but intervene and supervise when necessary  - Involve family in performance of ADLs  - Assess for home care needs following discharge   - Consider OT consult to assist with ADL evaluation and planning for discharge  - Provide patient education as appropriate  Outcome: Progressing  Goal: Maintains/Returns to pre admission functional level  Description: INTERVENTIONS:  - Perform BMAT or MOVE assessment daily    - Set and communicate daily mobility goal to care team and patient/family/caregiver  - Collaborate with rehabilitation services on mobility goals if consulted  - Perform Range of Motion 3 times a day  - Reposition patient every 3 hours    - Dangle patient 3 times a day  - Stand patient 3 times a day  - Ambulate patient 3 times a day  - Out of bed to chair 3 times a day   - Out of bed for meals 3 times a day  - Out of bed for toileting  - Record patient progress and toleration of activity level   Outcome: Progressing     Problem: Potential for Falls  Goal: Patient will remain free of falls  Description: INTERVENTIONS:  - Educate patient/family on patient safety including physical limitations  - Instruct patient to call for assistance with activity   - Consult OT/PT to assist with strengthening/mobility   - Keep Call bell within reach  - Keep bed low and locked with side rails adjusted as appropriate  - Keep care items and personal belongings within reach  - Initiate and maintain comfort rounds  - Make Fall Risk Sign visible to staff  - Offer Toileting every 2 Hours, in advance of need  - Initiate/Maintain bed alarm  - Obtain necessary fall risk management equipment  - Apply yellow socks and bracelet for high fall risk patients  - Consider moving patient to room near nurses station  Outcome: Progressing     Problem: RESPIRATORY - ADULT  Goal: Achieves optimal ventilation and oxygenation  Description: INTERVENTIONS:  - Assess for changes in respiratory status  - Assess for changes in mentation and behavior  - Position to facilitate oxygenation and minimize respiratory effort  - Oxygen administered by appropriate delivery if ordered  - Initiate smoking cessation education as indicated  - Encourage broncho-pulmonary hygiene including cough, deep breathe, Incentive Spirometry  - Assess the need for suctioning and aspirate as needed  - Assess and instruct to report SOB or any respiratory difficulty  - Respiratory Therapy support as indicated  Outcome: Progressing

## 2022-11-06 NOTE — UTILIZATION REVIEW
Initial Clinical Review    Admission: Date/Time/Statement:   Admission Orders (From admission, onward)     Ordered        11/04/22 2230  Place in Observation  Once                      Orders Placed This Encounter   Procedures   • Place in Observation     Standing Status:   Standing     Number of Occurrences:   1     Order Specific Question:   Level of Care     Answer:   Med Surg [16]     ED Arrival Information     Expected   -    Arrival   11/4/2022 17:25    Acuity   Urgent            Means of arrival   Walk-In    Escorted by   Family Member    Service   Hospitalist    Admission type   Emergency            Arrival complaint   chest pain            Chief Complaint   Patient presents with   • Chest Pain     L chest pressure started today  Denies n/v/d  Reports sob and headache  Denies cough/fever  Initial Presentation: 69 y o  female   To ER from home:  Admit  OBS  Status, MS level of care  For  R/o ACS  In setting  Of   Waxing/waning chest pressure since yesterday -  Improves w/Ntg  Extensive cardiac hx:   3 stents placed since on 3 different occasions  Las stent placed in 2012  Reports chronic shortness of breath with both exertion and rest         IN ER,  KAY score 5,  EKG NSR 73  V1-V2 inversion, stable  Troponin 6, delta negative; continue to trend with serial EKGs  Continue asa 81mg, statin    Possible musculoskeletal vs pericarditis vs acs  Check ESR, lidocaine patch  Consult Cardiology  Cont telemetry  Cont home antihypertensives/trend bp  Date:   11/05     Day 2:     No significant events overnight  Patient denies any chest pain presently  No shortness of breath  PER CARDIOLOGY:    EKG with no acute ST-T changes  Troponins negative x3  Plan for 2D echocardiogram tomorrow and pharmacological nuclear stress test on Monday 11/06   denies any further chest pain or shortness of breath, fever, or chills  Telemetry remains SR w/no sign ischemia    Awaiting ECHO and stress test tomorrow       ED Triage Vitals   Temperature Pulse Respirations Blood Pressure SpO2   11/04/22 1727 11/04/22 1727 11/04/22 1727 11/04/22 1727 11/04/22 1727   99 3 °F (37 4 °C) 101 20 (!) 185/122 98 %      Temp Source Heart Rate Source Patient Position - Orthostatic VS BP Location FiO2 (%)   11/04/22 1727 11/04/22 1727 11/04/22 1727 11/04/22 1727 --   Tympanic Monitor Sitting Left arm       Pain Score       11/04/22 2318       No Pain          Wt Readings from Last 1 Encounters:   11/06/22 109 kg (240 lb)     Additional Vital Signs:   11/06/22 15:06:52 98 3 °F (36 8 °C) 67 18 154/77 103 94 % -- --   11/06/22 1300 -- 66 -- 186/89 Abnormal  -- -- None (Room air) --   11/06/22 11:22:50 -- 87 -- 186/89 Abnormal  121 94 % -- --   11/06/22 07:48:45 -- 66 -- 141/80 100 93 % -- --   11/06/22 03:26:18 97 9 °F (36 6 °C) 74 13 137/72 94 96 % -- --   11/05/22 2323 97 9 °F (36 6 °C) 80 18 149/85 106 95 % -- --   11/05/22 2052 -- -- -- -- -- 94 % -- --   11/05/22 2001 -- -- -- -- -- 94 % None (Room air) --   11/05/22 19:49:38 98 6 °F (37 °C) 65 16 158/90 113 94 % -- --   11/05/22 1943 -- -- -- -- -- 93 % None (Room air) --   11/05/22 16:58:49 98 °F (36 7 °C) 75 -- 119/79 92 92 % -- --   11/05/22 1100 -- 59 22 -- -- 95 % None (Room air) Lying   11/05/22 1028 -- 59 20 127/60 -- 95 % None (Room air) Sitting   11/05/22 0820 -- 64 -- 150/72 -- -- -- --   11/05/22 0746 -- 53 Abnormal  20 117/59 -- 95 % None (Room air) Lying   11/05/22 0600 -- 70 20 129/67 91 94 % -- --   11/05/22 0545 -- 70 19 129/67 91 94 % None (Room air) Lying   11/05/22 0101 -- 61 20 148/81 109 96 % None (Room air) Sitting   11/04/22 2318 -- 63 18 121/57 95 95 % None (Room air) --   11/04/22 2200 -- 66 22 146/66 -- -- -- Sitting   11/04/22 2045 -- 73 20 179/80 Abnormal  113 96 % -- --   11/04/22 2000 -- -- -- 178/81 Abnormal  116 -- -- --   11/04/22 1956 -- 71 18 171/81 Abnormal  -- 97 % None (Room air) Lying   11/04/22 1730 97 5 °F (36 4 °C) 71 20 197/83 Abnormal  -- 98 % None (Room air) Sitting       Pertinent Labs/Diagnostic Test Results:     11/04   EKG -  Sinus rhythm  Septal infarct (cited on or before 15-MAR-2022)    11/05  EKG - Normal sinus rhythm    XR chest 2 views   Final Result by Chuckie Miller MD (11/05 4799)      No acute cardiopulmonary disease              Results from last 7 days   Lab Units 11/06/22  0443 11/04/22  1743   WBC Thousand/uL 10 76* 13 32*   HEMOGLOBIN g/dL 13 6 13 5   HEMATOCRIT % 41 7 40 7   PLATELETS Thousands/uL 300 309   NEUTROS ABS Thousands/µL 6 67 9 87*         Results from last 7 days   Lab Units 11/04/22  1743   SODIUM mmol/L 137   POTASSIUM mmol/L 3 8   CHLORIDE mmol/L 100   CO2 mmol/L 26   ANION GAP mmol/L 11   BUN mg/dL 16   CREATININE mg/dL 0 94   EGFR ml/min/1 73sq m 62   CALCIUM mg/dL 9 6     Results from last 7 days   Lab Units 11/04/22  1743   AST U/L 19   ALT U/L 20   ALK PHOS U/L 104   TOTAL PROTEIN g/dL 7 9   ALBUMIN g/dL 3 7   TOTAL BILIRUBIN mg/dL 0 47         Results from last 7 days   Lab Units 11/04/22  1743   GLUCOSE RANDOM mg/dL 125       Results from last 7 days   Lab Units 11/04/22  2204 11/04/22 2010 11/04/22  1743   HS TNI 0HR ng/L  --   --  4   HS TNI 2HR ng/L  --  5  --    HSTNI D2 ng/L  --  1  --    HS TNI 4HR ng/L 6  --   --    HSTNI D4 ng/L 2  --   --        Results from last 7 days   Lab Units 11/04/22  1743   SED RATE mm/hour 40*          ED Treatment:   Medication Administration from 11/04/2022 1725 to 11/05/2022 1640       Date/Time Order Dose Route Action     11/04/2022 2029 aspirin chewable tablet 324 mg 324 mg Oral Given     11/04/2022 2034 metoclopramide (REGLAN) injection 10 mg 10 mg Intravenous Given     11/04/2022 2034 diphenhydrAMINE (BENADRYL) injection 25 mg 25 mg Intravenous Given     11/05/2022 8777 aspirin chewable tablet 81 mg 81 mg Oral Given     11/05/2022 1614 atorvastatin (LIPITOR) tablet 80 mg 80 mg Oral Given     11/05/2022 0821 amLODIPine (NORVASC) tablet 5 mg 5 mg Oral Given 11/05/2022 0821 losartan (COZAAR) tablet 25 mg 25 mg Oral Given     11/05/2022 0821 clopidogrel (PLAVIX) tablet 75 mg 75 mg Oral Given     11/05/2022 0821 morphine injection 2 mg 2 mg Intravenous Given     11/05/2022 0821 cyanocobalamin (VITAMIN B-12) tablet 100 mcg 100 mcg Oral Given     11/05/2022 0820 metoprolol succinate (TOPROL-XL) 24 hr tablet 25 mg 25 mg Oral Given     11/05/2022 0059 zolpidem (AMBIEN) tablet 10 mg 10 mg Oral Given     11/05/2022 1614 fish oil capsule 1,000 mg 1,000 mg Oral Given     11/05/2022 0820 fish oil capsule 1,000 mg 1,000 mg Oral Given     11/05/2022 0059 fish oil capsule 1,000 mg 1,000 mg Oral Given     11/05/2022 0820 enoxaparin (LOVENOX) subcutaneous injection 40 mg 40 mg Subcutaneous Given     11/05/2022 0059 nitroglycerin (NITRO-BID) 2 % TD ointment 1 inch 1 inch Topical Given     11/05/2022 0820 lidocaine (LIDODERM) 5 % patch 1 patch 1 patch Topical Medication Applied        Past Medical History:   Diagnosis Date   • Angina at rest [facility])    • Arthritis      • Back problem    • [facility] )    • Gall stones    • Heart attack ([facility] 75 )    • High blood pressure    • Stomach problems      Admitting Diagnosis: Chest pain [R07 9]  Chest pain, unspecified type [R07 9]  Age/Sex: 69 y o  female  Admission Orders:    See above note   Scheduled Medications:  amLODIPine, 5 mg, Oral, Daily  aspirin, 81 mg, Oral, Daily  atorvastatin, 80 mg, Oral, Daily With Dinner  clopidogrel, 75 mg, Oral, Daily  cyanocobalamin, 100 mcg, Oral, Daily  enoxaparin, 40 mg, Subcutaneous, BID  fish oil, 1,000 mg, Oral, TID  lidocaine, 1 patch, Topical, Daily  losartan, 25 mg, Oral, Daily  metoprolol succinate, 25 mg, Oral, Daily  zolpidem, 10 mg, Oral, HS      Continuous IV Infusions:     PRN Meds:  acetaminophen, 650 mg, Oral, Q6H PRN  albuterol, 2 5 mg, Nebulization, Q4H PRN  ALPRAZolam, 0 25 mg, Oral, HS PRN  morphine injection, 2 mg, Intravenous, Q4H PRN   Or  morphine injection, 4 mg, Intravenous, Q4H PRN  nitroglycerin, 0 4 mg, Sublingual, Q5 Min PRN  ondansetron, 4 mg, Intravenous, Q4H PRN        IP CONSULT TO CARDIOLOGY    Network Utilization Review Department  ATTENTION: Please call with any questions or concerns to [phone] and carefully listen to the prompts so that you are directed to the right person  All voicemails are confidential   Swati Cruz all requests for admission clinical reviews, approved or denied determinations and any other requests to dedicated fax number below belonging to the campus where the patient is receiving treatment   List of dedicated fax numbers for the Facilities:  [address] DENIALS (Administrative/Medical Necessity) [phone]   [address] (Maternity/NICU/Pediatrics) [phone]   [address] [phone]   Gi Rivera 77 [phone]   [address] [facility] [address] [place] [address] Adriana Berger [facility] 28 [phone]   [address] [facility] [place] Kapadia [facility] 134 815 [facility] [phone]

## 2022-11-06 NOTE — ASSESSMENT & PLAN NOTE
· Patient presented to the ED with complaints of left-sided chest pain that has been intermittent x 2 days with improvement s/p topical nitroglycerin   · Hx of MI in 1998 with bypass, 3 stents placed since on 3 different occasions  Las stent placed in 2012  · KAY score 5  · EKG with no acute ST-T changes    · Troponin negative x 3  • Cardiology consult, appreciate input  o Plan for stress test on Monday  o Echocardiogram ordered; pending   o Monitor on telemetry

## 2022-11-06 NOTE — PROGRESS NOTES
[address] [facility]  Progress Note - Rosina Pederson 1955, 69 y o  female MRN: [id number]  Unit/Bed#: -01 Encounter: [id number]  Primary Care Provider: Raquel Alvarado MD   Date and time admitted to hospital: 11/4/2022  5:39 PM    * Chest pain  Assessment & Plan  · Patient presented to the ED with complaints of left-sided chest pain that has been intermittent x 2 days with improvement s/p topical nitroglycerin   · Hx of MI in 1998 with bypass, 3 stents placed since on 3 different occasions  Las stent placed in 2012  · KAY score 5  · EKG with no acute ST-T changes  · Troponin negative x 3  • Cardiology consult, appreciate input  o Plan for stress test on Monday  o Echocardiogram ordered; pending   o Monitor on telemetry    Headache  Assessment & Plan  · Now resolved  · Prn tylenol     HTN (hypertension)  Assessment & Plan  · Elevated BP on arrival 185/122  · Improved without intervention   · Continue home amlodipine, losartan   · Monitor     VTE Pharmacologic Prophylaxis: VTE Score: 4 Moderate Risk (Score 3-4) - Pharmacological DVT Prophylaxis Ordered: enoxaparin (Lovenox)  Patient Centered Rounds: I performed bedside rounds with nursing staff today  Discussions with Specialists or Other Care Team Provider:     Education and Discussions with Family / Patient: Updated  (Family ) at bedside  Time Spent for Care: 20 minutes  More than 50% of total time spent on counseling and coordination of care as described above  Current Length of Stay: 0 day(s)  Current Patient Status: Observation   Certification Statement: The patient will continue to require additional inpatient hospital stay due to ongoing treatment in setting of chest pain; need for echo/stress test   Discharge Plan: Anticipate discharge in 24-48 hrs to home      Code Status: Level 1 - Full Code    Subjective:   Patient resting in bed; denies any further chest pain or shortness of breath, fever, or chills  Objective:     Vitals:   Temp (24hrs), Av 1 °F (36 7 °C), Min:97 9 °F (36 6 °C), Max:98 6 °F (37 °C)    Temp:  [97 9 °F (36 6 °C)-98 6 °F (37 °C)] 97 9 °F (36 6 °C)  HR:  [65-87] 87  Resp:  [13-18] 13  BP: (119-186)/(72-90) 186/89  SpO2:  [92 %-96 %] 94 %  Body mass index is 48 47 kg/m²  Input and Output Summary (last 24 hours): Intake/Output Summary (Last 24 hours) at 2022 1215  Last data filed at 2022 3627  Gross per 24 hour   Intake 0 ml   Output 1400 ml   Net -1400 ml       Physical Exam:   Physical Exam  Vitals and nursing note reviewed  Constitutional:       General: She is not in acute distress  Appearance: She is normal weight  She is not ill-appearing  Cardiovascular:      Rate and Rhythm: Normal rate  Pulses: Normal pulses  Heart sounds: Normal heart sounds  Pulmonary:      Effort: Pulmonary effort is normal       Breath sounds: Normal breath sounds  Abdominal:      General: Bowel sounds are normal       Palpations: Abdomen is soft  Musculoskeletal:         General: Normal range of motion  Skin:     General: Skin is warm and dry  Capillary Refill: Capillary refill takes less than 2 seconds  Neurological:      Mental Status: She is alert and oriented to person, place, and time     Psychiatric:         Mood and Affect: Mood normal         Additional Data:     Labs:  Results from last 7 days   Lab Units 22  0443   WBC Thousand/uL 10 76*   HEMOGLOBIN g/dL 13 6   HEMATOCRIT % 41 7   PLATELETS Thousands/uL 300   NEUTROS PCT % 61   LYMPHS PCT % 27   MONOS PCT % 9   EOS PCT % 2     Results from last 7 days   Lab Units 22  1743   SODIUM mmol/L 137   POTASSIUM mmol/L 3 8   CHLORIDE mmol/L 100   CO2 mmol/L 26   BUN mg/dL 16   CREATININE mg/dL 0 94   ANION GAP mmol/L 11   CALCIUM mg/dL 9 6   ALBUMIN g/dL 3 7   TOTAL BILIRUBIN mg/dL 0 47   ALK PHOS U/L 104   ALT U/L 20   AST U/L 19   GLUCOSE RANDOM mg/dL 125 Lines/Drains:  Invasive Devices  Report    Peripheral Intravenous Line  Duration           Peripheral IV 11/05/22 Right Forearm <1 day                  Telemetry:  Telemetry Orders (From admission, onward)             48 Hour Telemetry Monitoring  Continuous x 48 hours        References:    Telemetry Guidelines   Question:  Reason for 48 Hour Telemetry  Answer:  Acute MI, chest pain - R/O MI, or unstable angina                 Telemetry Reviewed: Normal Sinus Rhythm  Indication for Continued Telemetry Use: Acute MI/Unstable Angina/Rule out ACS             Imaging: No pertinent imaging reviewed      Recent Cultures (last 7 days):         Last 24 Hours Medication List:   Current Facility-Administered Medications   Medication Dose Route Frequency Provider Last Rate   • acetaminophen  650 mg Oral Q6H PRN Azalia Rollins MD     • albuterol  2 5 mg Nebulization Q4H PRN Chanel Sotelo PA-C     • ALPRAZolam  0 25 mg Oral HS PRN Chanel Sotelo PA-C     • amLODIPine  5 mg Oral Daily Chanel Sotelo PA-C     • aspirin  81 mg Oral Daily Chanel Sotelo PA-C     • atorvastatin  80 mg Oral Daily With [employer]     • clopidogrel  75 mg Oral Daily Chanel Sotelo PA-C     • cyanocobalamin  100 mcg Oral Daily Chanel Sotelo PA-C     • enoxaparin  40 mg Subcutaneous BID Chanel Sotelo PA-C     • fish oil  1,000 mg [facility] TID [facility]     • lidocaine  1 patch Topical Daily Chanel Sotelo PA-C     • losartan  25 mg Oral Daily Chanel Sotelo PA-C     • metoprolol succinate  25 mg Oral Daily Chanel Sotelo PA-C     • morphine injection  2 mg Intravenous Q4H PRN Chanel Sotelo PA-C      Or   • morphine injection  4 mg Intravenous Q4H PRN Chanel Sotelo PA-C     • nitroglycerin  0 4 mg Sublingual Q5 Min PRN Azalia Rollins MD     • ondansetron  4 mg Intravenous Q4H PRN Azalia Rollins MD     • zolpidem  10 mg Oral HS Chanel Sotelo MADONNA          Today, Patient Was Seen By: Gold Romero    **Please Note: This note may have been constructed using a voice recognition system  **

## 2022-11-06 NOTE — PROGRESS NOTES
General Cardiology   Progress Note   Yifan Rader 69 y o  female MRN: [id number]  Unit/Bed#: -01 Encounter: [id number]      SUBJECTIVE:   No significant events overnight  Patient denies any chest pain presently  No shortness of breath  REVIEW OF SYSTEMS:  Constitutional:  Denies fever or chills   Eyes:  Denies change in visual acuity   HENT:  Denies nasal congestion or sore throat   Respiratory:  Denies cough or shortness of breath   Cardiovascular:  Denies chest pain or edema   GI:  Denies abdominal pain, nausea, vomiting, bloody stools or diarrhea   :  Denies dysuria, frequency, difficulty in micturition and nocturia  Musculoskeletal:  Denies back pain or joint pain   Neurologic:  Denies headache, focal weakness or sensory changes   Endocrine:  Denies polyuria or polydipsia   Lymphatic:  Denies swollen glands   Psychiatric:  Denies depression or anxiety     OBJECTIVE:   Vitals:  Vitals:    22 1300   BP: (!) 186/89   Pulse: 66   Resp:    Temp:    SpO2:      Body mass index is 48 47 kg/m²  Systolic (43FUQ), UOT:146 , Min:119 , NAC:027     Diastolic (79GRC), RF, Min:72, Max:90      Intake/Output Summary (Last 24 hours) at 2022 1153  Last data filed at 2022 9272  Gross per 24 hour   Intake 0 ml   Output 1400 ml   Net -1400 ml     Weight (last 2 days)       Date/Time Weight    22 2318 109 (240)                PHYSICAL EXAMS:  General:  Patient is not in acute distress, laying in the bed comfortably, awake, alert responding to commands  Head: Normocephalic, Atraumatic  HEENT:  Both pupils normal-size atraumatic, normocephalic, nonicteric  Neck:  JVP not raised  Trachea central  Respiratory:  Bronchovascular breathing all over the chest without any accompaniment  Cardiovascular:  Regular rate and rhythm no S3 no murmurs  GI:  Abdomen soft nontender   Liver and spleen normal size  Lymphatic:  No cervical or inguinal lymphadenopathy  Neurologic:  Patient is awake alert, responding to command, well-oriented to time and place and person moving     LABORATORY RESULTS:        CBC with diff:   Results from last 7 days   Lab Units 11/06/22  0443 11/04/22  1743   WBC Thousand/uL 10 76* 13 32*   HEMOGLOBIN g/dL 13 6 13 5   HEMATOCRIT % 41 7 40 7   MCV fL 97 95   PLATELETS Thousands/uL 300 309   MCH pg 31 5 31 5   MCHC g/dL 32 6 33 2   RDW % 12 8 12 6   MPV fL 9 2 8 8*   NRBC AUTO /100 WBCs 0 0       CMP:  Results from last 7 days   Lab Units 11/04/22  1743   POTASSIUM mmol/L 3 8   CHLORIDE mmol/L 100   CO2 mmol/L 26   BUN mg/dL 16   CREATININE mg/dL 0 94   CALCIUM mg/dL 9 6   AST U/L 19   ALT U/L 20   ALK PHOS U/L 104   EGFR ml/min/1 73sq m 62       BMP:  Results from last 7 days   Lab Units 11/04/22  1743   POTASSIUM mmol/L 3 8   CHLORIDE mmol/L 100   CO2 mmol/L 26   BUN mg/dL 16   CREATININE mg/dL 0 94   CALCIUM mg/dL 9 6                              Lipid Profile:   No results found for: CHOL  Lab Results   Component Value Date    HDL 54 11/29/2018    HDL 35 (L) 04/18/2018     Lab Results   Component Value Date    LDLCALC 27 11/29/2018    LDLCALC 25 04/18/2018     Lab Results   Component Value Date    TRIG 183 (H) 11/29/2018    TRIG 156 (H) 04/18/2018       Cardiac testing:  No results found for this or any previous visit  No results found for this or any previous visit  No results found for this or any previous visit  No valid procedures specified  No results found for this or any previous visit        Meds/Allergies   all current active meds have been reviewed  Medications Prior to Admission   Medication    ALPRAZolam (XANAX) 0 25 mg tablet    amLODIPine (NORVASC) 5 mg tablet    aspirin 81 MG tablet    atorvastatin (LIPITOR) 80 mg tablet    clopidogrel (PLAVIX) 75 mg tablet    losartan (COZAAR) 25 mg tablet    Omega-3 1000 MG CAPS    traMADol (ULTRAM) 50 mg tablet    zolpidem (AMBIEN) 10 mg tablet    acetaminophen (TYLENOL) 325 mg tablet    albuterol (2 5 mg/3 mL) 0 083 % nebulizer solution    calcium-vitamin D 250-100 MG-UNIT per tablet    clotrimazole-betamethasone (LOTRISONE) 1-0 05 % cream    conjugated estrogens (PREMARIN) vaginal cream    cyanocobalamin (VITAMIN B-12) 100 mcg tablet    guaiFENesin (ROBITUSSIN) 100 MG/5ML oral liquid    metoprolol succinate (TOPROL-XL) 25 mg 24 hr tablet    nitroglycerin (NITROSTAT) 0 4 mg SL tablet    PNEUMOVAX 23 25 MCG/0 5ML    terconazole (TERAZOL 7) 0 4 % vaginal cream          ASSESSMENT & PLAN   This patient has history of CAD status post CABG status post PCI as well as hypertension and hyperlipidemia  Patient presented with chest discomfort  Cardiac enzymes are negative  EKG does not show any acute changes  Patient scheduled for echocardiogram to evaluate ejection fraction and assess for any evidence of wall motion abnormalities  Patient will be scheduled for pharmacological nuclear stress test to assess for ischemia tomorrow  Medications were reviewed  Plan of care discussed with patient as well as family at bedside  Tonya Belle MD  11/6/2022,11:53 AM    Portions of the record may have been created with voice recognition software  Occasional wrong word or "sound a like" substitutions may have occurred due to the inherent limitations of voice recognition software  Read the chart carefully and recognize, using context, where substitutions have occurred

## 2022-11-07 ENCOUNTER — APPOINTMENT (OUTPATIENT)
Dept: NON INVASIVE DIAGNOSTICS | Facility: HOSPITAL | Age: 67
End: 2022-11-07

## 2022-11-07 ENCOUNTER — APPOINTMENT (OUTPATIENT)
Dept: NUCLEAR MEDICINE | Facility: HOSPITAL | Age: 67
End: 2022-11-07

## 2022-11-07 VITALS
OXYGEN SATURATION: 95 % | WEIGHT: 240 LBS | BODY MASS INDEX: 48.38 KG/M2 | SYSTOLIC BLOOD PRESSURE: 171 MMHG | HEIGHT: 59 IN | DIASTOLIC BLOOD PRESSURE: 74 MMHG | TEMPERATURE: 98 F | RESPIRATION RATE: 19 BRPM | HEART RATE: 70 BPM

## 2022-11-07 PROBLEM — R51.9 HEADACHE: Status: RESOLVED | Noted: 2022-11-05 | Resolved: 2022-11-07

## 2022-11-07 LAB
ANION GAP SERPL CALCULATED.3IONS-SCNC: 10 MMOL/L (ref 4–13)
ARRHY DURING EX: NORMAL
BASOPHILS # BLD AUTO: 0.05 THOUSANDS/ÂΜL (ref 0–0.1)
BASOPHILS NFR BLD AUTO: 0 % (ref 0–1)
BUN SERPL-MCNC: 15 MG/DL (ref 5–25)
CALCIUM SERPL-MCNC: 9.6 MG/DL (ref 8.3–10.1)
CHEST PAIN STATEMENT: NORMAL
CHLORIDE SERPL-SCNC: 104 MMOL/L (ref 96–108)
CO2 SERPL-SCNC: 26 MMOL/L (ref 21–32)
CREAT SERPL-MCNC: 0.83 MG/DL (ref 0.6–1.3)
EOSINOPHIL # BLD AUTO: 0.19 THOUSAND/ÂΜL (ref 0–0.61)
EOSINOPHIL NFR BLD AUTO: 2 % (ref 0–6)
ERYTHROCYTE [DISTWIDTH] IN BLOOD BY AUTOMATED COUNT: 12.7 % (ref 11.6–15.1)
GFR SERPL CREATININE-BSD FRML MDRD: 72 ML/MIN/1.73SQ M
GLUCOSE P FAST SERPL-MCNC: 112 MG/DL (ref 65–99)
GLUCOSE SERPL-MCNC: 112 MG/DL (ref 65–140)
HCT VFR BLD AUTO: 40.2 % (ref 34.8–46.1)
HGB BLD-MCNC: 13.1 G/DL (ref 11.5–15.4)
IMM GRANULOCYTES # BLD AUTO: 0.05 THOUSAND/UL (ref 0–0.2)
IMM GRANULOCYTES NFR BLD AUTO: 0 % (ref 0–2)
LYMPHOCYTES # BLD AUTO: 3.18 THOUSANDS/ÂΜL (ref 0.6–4.47)
LYMPHOCYTES NFR BLD AUTO: 26 % (ref 14–44)
MAX DIASTOLIC BP: 74 MMHG
MAX HEART RATE: 92 BPM
MAX HR: 92 BPM
MAX PREDICTED HEART RATE: 151 BPM
MAX. SYSTOLIC BP: 163 MMHG
MCH RBC QN AUTO: 31.4 PG (ref 26.8–34.3)
MCHC RBC AUTO-ENTMCNC: 32.6 G/DL (ref 31.4–37.4)
MCV RBC AUTO: 96 FL (ref 82–98)
MONOCYTES # BLD AUTO: 1.06 THOUSAND/ÂΜL (ref 0.17–1.22)
MONOCYTES NFR BLD AUTO: 9 % (ref 4–12)
NEUTROPHILS # BLD AUTO: 7.54 THOUSANDS/ÂΜL (ref 1.85–7.62)
NEUTS SEG NFR BLD AUTO: 63 % (ref 43–75)
NRBC BLD AUTO-RTO: 0 /100 WBCS
PLATELET # BLD AUTO: 267 THOUSANDS/UL (ref 149–390)
PMV BLD AUTO: 9 FL (ref 8.9–12.7)
POTASSIUM SERPL-SCNC: 3.9 MMOL/L (ref 3.5–5.3)
PROTOCOL NAME: NORMAL
RATE PRESSURE PRODUCT: NORMAL
RBC # BLD AUTO: 4.17 MILLION/UL (ref 3.81–5.12)
REASON FOR TERMINATION: NORMAL
SL CV REST NUCLEAR ISOTOPE DOSE: 11 MCI
SL CV STRESS NUCLEAR ISOTOPE DOSE: 33 MCI
SL CV STRESS RECOVERY BP: NORMAL MMHG
SL CV STRESS RECOVERY HR: 74 BPM
SODIUM SERPL-SCNC: 140 MMOL/L (ref 135–147)
STRESS BASELINE BP: NORMAL MMHG
STRESS BASELINE HR: 74 BPM
STRESS O2 SAT REST: 96 %
STRESS PEAK HR: 92 BPM
STRESS POST O2 SAT PEAK: 96 %
STRESS POST PEAK BP: 157 MMHG
STRESS ST DEPRESSION: 0 MM
TARGET HR FORMULA: NORMAL
TEST INDICATION: NORMAL
TIME IN EXERCISE PHASE: NORMAL
WBC # BLD AUTO: 12.07 THOUSAND/UL (ref 4.31–10.16)

## 2022-11-07 RX ORDER — POLYETHYLENE GLYCOL 3350 17 G/17G
17 POWDER, FOR SOLUTION ORAL DAILY PRN
Status: DISCONTINUED | OUTPATIENT
Start: 2022-11-07 | End: 2022-11-07 | Stop reason: HOSPADM

## 2022-11-07 RX ORDER — HYDROXYZINE HYDROCHLORIDE 25 MG/1
25 TABLET, FILM COATED ORAL ONCE
Status: COMPLETED | OUTPATIENT
Start: 2022-11-07 | End: 2022-11-07

## 2022-11-07 RX ORDER — ISOSORBIDE MONONITRATE 30 MG/1
30 TABLET, EXTENDED RELEASE ORAL DAILY
Status: DISCONTINUED | OUTPATIENT
Start: 2022-11-07 | End: 2022-11-07 | Stop reason: HOSPADM

## 2022-11-07 RX ORDER — ISOSORBIDE MONONITRATE 30 MG/1
30 TABLET, EXTENDED RELEASE ORAL DAILY
Qty: 30 TABLET | Refills: 1 | Status: SHIPPED | OUTPATIENT
Start: 2022-11-07

## 2022-11-07 RX ORDER — AMOXICILLIN 250 MG
2 CAPSULE ORAL DAILY
Status: DISCONTINUED | OUTPATIENT
Start: 2022-11-07 | End: 2022-11-07 | Stop reason: HOSPADM

## 2022-11-07 RX ADMIN — AMLODIPINE BESYLATE 5 MG: 5 TABLET ORAL at 11:55

## 2022-11-07 RX ADMIN — LIDOCAINE 5% 1 PATCH: 700 PATCH TOPICAL at 11:54

## 2022-11-07 RX ADMIN — VITAM B12 100 MCG: 100 TAB at 11:55

## 2022-11-07 RX ADMIN — REGADENOSON 0.4 MG: 0.08 INJECTION, SOLUTION INTRAVENOUS at 10:20

## 2022-11-07 RX ADMIN — ISOSORBIDE MONONITRATE 30 MG: 30 TABLET, EXTENDED RELEASE ORAL at 15:41

## 2022-11-07 RX ADMIN — ATORVASTATIN CALCIUM 80 MG: 40 TABLET, FILM COATED ORAL at 15:40

## 2022-11-07 RX ADMIN — LOSARTAN POTASSIUM 25 MG: 25 TABLET, FILM COATED ORAL at 11:55

## 2022-11-07 RX ADMIN — ENOXAPARIN SODIUM 40 MG: 40 INJECTION SUBCUTANEOUS at 11:54

## 2022-11-07 RX ADMIN — ASPIRIN 81 MG: 81 TABLET, CHEWABLE ORAL at 11:55

## 2022-11-07 RX ADMIN — METOPROLOL SUCCINATE 25 MG: 25 TABLET, EXTENDED RELEASE ORAL at 11:55

## 2022-11-07 RX ADMIN — OMEGA-3 FATTY ACIDS CAP 1000 MG 1000 MG: 1000 CAP at 11:55

## 2022-11-07 RX ADMIN — OMEGA-3 FATTY ACIDS CAP 1000 MG 1000 MG: 1000 CAP at 15:41

## 2022-11-07 RX ADMIN — HYDROXYZINE HYDROCHLORIDE 25 MG: 25 TABLET ORAL at 08:52

## 2022-11-07 RX ADMIN — CLOPIDOGREL BISULFATE 75 MG: 75 TABLET ORAL at 11:55

## 2022-11-07 RX ADMIN — SENNOSIDES AND DOCUSATE SODIUM 2 TABLET: 50; 8.6 TABLET ORAL at 13:59

## 2022-11-07 NOTE — PLAN OF CARE
Problem: Nutrition/Hydration-ADULT  Goal: Nutrient/Hydration intake appropriate for improving, restoring or maintaining nutritional needs  Description: Monitor and assess patient's nutrition/hydration status for malnutrition  Collaborate with interdisciplinary team and initiate plan and interventions as ordered  Monitor patient's weight and dietary intake as ordered or per policy  Utilize nutrition screening tool and intervene as necessary  Determine patient's food preferences and provide high-protein, high-caloric foods as appropriate       INTERVENTIONS:  - Monitor oral intake, urinary output, labs, and treatment plans  - Assess nutrition and hydration status and recommend course of action  - Evaluate amount of meals eaten  - Assist patient with eating if necessary   - Allow adequate time for meals  - Recommend/ encourage appropriate diets, oral nutritional supplements, and vitamin/mineral supplements  - Order, calculate, and assess calorie counts as needed  - Recommend, monitor, and adjust tube feedings and TPN/PPN based on assessed needs  - Assess need for intravenous fluids  - Provide specific nutrition/hydration education as appropriate  - Include patient/family/caregiver in decisions related to nutrition  Outcome: Progressing     Problem: RESPIRATORY - ADULT  Goal: Achieves optimal ventilation and oxygenation  Description: INTERVENTIONS:  - Assess for changes in respiratory status  - Assess for changes in mentation and behavior  - Position to facilitate oxygenation and minimize respiratory effort  - Oxygen administered by appropriate delivery if ordered  - Initiate smoking cessation education as indicated  - Encourage broncho-pulmonary hygiene including cough, deep breathe, Incentive Spirometry  - Assess the need for suctioning and aspirate as needed  - Assess and instruct to report SOB or any respiratory difficulty  - Respiratory Therapy support as indicated  Outcome: Progressing     Problem: Potential for Falls  Goal: Patient will remain free of falls  Description: INTERVENTIONS:  - Educate patient/family on patient safety including physical limitations  - Instruct patient to call for assistance with activity   - Consult OT/PT to assist with strengthening/mobility   - Keep Call bell within reach  - Keep bed low and locked with side rails adjusted as appropriate  - Keep care items and personal belongings within reach  - Initiate and maintain comfort rounds  - Make Fall Risk Sign visible to staff  - Offer Toileting   - Initiate/Maintain   - Obtain necessary fall risk management equipment:  - Apply yellow socks and bracelet for high fall risk patients  - Consider moving patient to room near nurses station  Outcome: Progressing

## 2022-11-07 NOTE — DISCHARGE SUMMARY
[address] [facility]  Discharge- Duey Sero 1955, 69 y o  female MRN: [id number]  Unit/Bed#: -01 Encounter: [id number]  Primary Care Provider: Jc Alejo MD   Date and time admitted to hospital: 11/4/2022  5:39 PM    * Chest pain  Assessment & Plan  · Patient presented to the ED with complaints of left-sided chest pain that has been intermittent x 2 days with improvement s/p topical nitroglycerin   · Hx of MI in 1998 with bypass, 3 stents placed since on 3 different occasions  Last stent placed in 2012  · KAY score 5  · EKG with no acute ST-T changes  · Troponin negative x 3  • Cardiology consult, appreciate input  o Stress test negative for inducible ischemia, stable for discharge  o Start Imdur 30 mg daily  o Follow up with Dr Rach Fortune within the month   o Echocardiogram 11/6: LVEF 60%, no RWMA    HTN (hypertension)  Assessment & Plan  · Elevated BP on arrival 185/122  · Fluctuating since presentation  · Continue home amlodipine, losartan   · Should improve on Imdur, monitor    Headache-resolved as of 11/7/2022  Assessment & Plan  · Now resolved  · Prn tylenol         Medical Problems             Resolved Problems  Date Reviewed: 11/7/2022          Resolved    Headache 11/7/2022     Resolved by  Gideon Cruz PA-C              Discharging Physician / Practitioner: Gideon Cruz  PCP: Jc Alejo MD  Admission Date:   Admission Orders (From admission, onward)     Ordered        11/04/22 2230  Place in Observation  Once                      Discharge Date: 11/07/22    Consultations During Hospital Stay:  · IP CONSULT TO CARDIOLOGY     Procedures Performed:   · None     Significant Findings / Test Results:   XR chest 2 views   Final Result by Cynthia Ramos MD (11/05 8715)      No acute cardiopulmonary disease                    Workstation performed: [id number]         Echo 11/6/2022:  •  Left Ventricle: Left ventricular cavity size is normal  Wall thickness is mildly increased  There is mild concentric hypertrophy  The left ventricular ejection fraction is 60%  Systolic function is normal  Wall motion is normal  Diastolic function is normal   •  Mitral Valve: There is mild annular calcification  •  Tricuspid Valve: There is mild regurgitation  NM Stress 11/7/2022:  •  Stress ECG: No ST deviation is noted  The ECG was not diagnostic due to pharmacological (vasodilator) stress  •  Stress Function: Left ventricular function post-stress is normal   •  Perfusion: Perfusion defects in the inferior and distal anterior wall seen in the rest and supine stress images were not seen in the prone stress images indicating that they were likely diaphragmatic and breast attenuation artifacts respectively  •  Stress Combined Conclusion: The ECG and SPECT imaging portions of the stress study are concordant with no evidence of stress induced myocardial ischemia  Incidental Findings:   · None      Test Results Pending at Discharge (will require follow up): · None      Outpatient Tests Requested:  · None     Complications:  None     Reason for Admission: ACS rule out     Hospital Course:   Nico Bolivar is a 69 y o  female patient who originally presented to the hospital on 11/4/2022 due to left-sided chest pressure with shortness of breath and headache  Patient does have a past medical history quite significant for CAD status post bypass in 1998 followed by 3 subsequent stents the last 1 about 10 years ago  Patient tried nitro x2 at home without relief  Patient typically follows with [facility], however was seen by our Cardiology team here and underwent serial cardiac enzymes which were (-), telemetry monitor which was consistent with normal sinus rhythm with occasional PVC, and nuclear medicine stress test today which was (-) for inducible ischemia  Patient will be initiated on Imdur 30 mg daily and will follow up with her primary cardiologist within the month  She understands return to the ER for any concerning recurrence of her chest pain, or any chest pain not relieved by medication  Please see above list of diagnoses and related plan for additional information  Condition at Discharge: good    Discharge Day Visit / Exam:   * Please refer to separate progress note for these details *    Discussion with Family: Deferred, plan discussed earlier today with patient and family present     Discharge instructions/Information to patient and family:   See after visit summary for information provided to patient and family  Provisions for Follow-Up Care:  See after visit summary for information related to follow-up care and any pertinent home health orders  Disposition:   Home    Planned Readmission: none     Discharge Statement:  I spent approx 25 minutes discharging the patient  This time was spent on the day of discharge  I had direct contact with the patient on the day of discharge  Greater than 50% of the total time was spent examining patient, answering all patient questions, arranging and discussing plan of care with patient as well as directly providing post-discharge instructions  Additional time then spent on discharge activities  Discharge Medications:  See after visit summary for reconciled discharge medications provided to patient and/or family        **Please Note: This note may have been constructed using a voice recognition system**

## 2022-11-07 NOTE — PROGRESS NOTES
Cardiology Progress Note - Jacki Shelby 69 y o  female MRN: [id number]    Unit/Bed#: -01 Encounter: [id number]      Assessment/Plan:  1  Chest pain, unspecified  · Troponins negative x3  · EKG reveals normal sinus rhythm  · Echo EF 60% with mild TR  · Nuclear stress test negative for ischemic findings  · Continue aspirin 81 mg, atorvastatin 80 mg, and Toprol 25 mg  · Start Imdur 30 mg daily  · Telemetry monitoring     2  Coronary artery disease s/p CABG and PCI  · S/p CABG x2 (1997)  · S/p PCI x3 (March 2004)  · S/p PCI SVG to OM (05/27/2021)  · Continue aspirin 81 mg, Plavix 75 mg, atorvastatin 80 mg, Toprol 25 mg, and losartan 25 mg  · Start Imdur 30 mg daily     3  Hypertension  · Currently 167/87; continue to monitor  · Continue amlodipine 5 mg, Toprol 25 mg, and losartan 25 mg  · Start Imdur 30 mg daily     4  Mixed hyperlipidemia  · Continue atorvastatin 80 mg     5  Two silent strokes noted on brain imaging  · Management per Internal Medicine      Patient is clear for discharge from a cardiology standpoint  Patient informed to schedule follow up appointment with primary cardiology in 1 month  Thank you for this consultation  Subjective:   Patient seen and examined  No significant events overnight  Patient reports she is feeling well today  Objective:     Vitals: Blood pressure 167/87, pulse 67, temperature 97 9 °F (36 6 °C), resp  rate 17, height 4' 11" (1 499 m), weight 109 kg (240 lb), SpO2 94 %, not currently breastfeeding , Body mass index is 48 47 kg/m² ,   Orthostatic Blood Pressures    Flowsheet Row Most Recent Value   Blood Pressure 167/87 filed at 11/07/2022 1203   Patient Position - Orthostatic VS Lying filed at 11/05/2022 1100            Intake/Output Summary (Last 24 hours) at 11/7/2022 1507  Last data filed at 11/7/2022 0915  Gross per 24 hour   Intake 360 ml   Output --   Net 360 ml         Physical Exam:  Physical Exam  Vitals and nursing note reviewed     Constitutional: General: She is not in acute distress  Appearance: She is well-developed  She is not ill-appearing or diaphoretic  HENT:      Head: Normocephalic and atraumatic  Nose: Nose normal    Eyes:      Conjunctiva/sclera: Conjunctivae normal    Cardiovascular:      Rate and Rhythm: Normal rate and regular rhythm  Pulses: Normal pulses  Heart sounds: Normal heart sounds  No murmur heard  Pulmonary:      Effort: Pulmonary effort is normal  No respiratory distress  Breath sounds: Normal breath sounds  No wheezing or rales  Chest:      Chest wall: No tenderness  Abdominal:      Palpations: Abdomen is soft  Tenderness: There is no abdominal tenderness  Musculoskeletal:      Cervical back: Neck supple  Right lower leg: No edema  Left lower leg: No edema  Skin:     General: Skin is warm and dry  Neurological:      Mental Status: She is alert and oriented to person, place, and time     Psychiatric:         Mood and Affect: Mood normal          Judgment: Judgment normal               Medications:      Current Facility-Administered Medications:   •  acetaminophen (TYLENOL) tablet 650 mg, 650 mg, Oral, Q6H PRN, Brandon Best MD  •  albuterol inhalation solution 2 5 mg, 2 5 mg, Nebulization, Q4H PRN, Chanel Sotelo, PA-C  •  ALPRAZolam (XANAX) tablet 0 25 mg, 0 25 mg, Oral, HS PRN, Chanel Baumanramella, PA-C, 0 25 mg at 11/05/22 2156  •  amLODIPine (NORVASC) tablet 5 mg, 5 mg, Oral, Daily, Chanel Baumanramella, PA-C, 5 mg at 11/07/22 1155  •  aspirin chewable tablet 81 mg, 81 mg, Oral, Daily, Chanel Mernaramella, PA-C, 81 mg at 11/07/22 1155  •  atorvastatin (LIPITOR) tablet 80 mg, 80 mg, Oral, Daily With Gonzalo Tatum, PA-C, 80 mg at 11/06/22 1741  •  clopidogrel (PLAVIX) tablet 75 mg, 75 mg, Oral, Daily, Chanel Baumanramella, PA-C, 75 mg at 11/07/22 1155  •  cyanocobalamin (VITAMIN B-12) tablet 100 mcg, 100 mcg, Oral, Daily, Chanel Baumanramella, PA-C, 100 mcg at 11/07/22 1155  •  enoxaparin (LOVENOX) subcutaneous injection 40 mg, 40 mg, Subcutaneous, BID, Chanel Sotelo PA-C, 40 mg at 11/07/22 1154  •  fish oil capsule 1,000 mg, 1,000 mg, Oral, TID, KATERIN Titus-C, 1,000 mg at 11/07/22 1155  •  isosorbide mononitrate (IMDUR) 24 hr tablet 30 mg, 30 mg, Oral, Daily, Maldonado Ji PA-C  •  lidocaine (LIDODERM) 5 % patch 1 patch, 1 patch, Topical, Daily, Chanel Sotelo PA-C, 1 patch at 11/07/22 1154  •  losartan (COZAAR) tablet 25 mg, 25 mg, Oral, Daily, Chanel Sotelo PA-C, 25 mg at 11/07/22 1155  •  metoprolol succinate (TOPROL-XL) 24 hr tablet 25 mg, 25 mg, Oral, Daily, Chanel Sotelo PA-C, 25 mg at 11/07/22 1155  •  morphine injection 2 mg, 2 mg, Intravenous, Q4H PRN, 2 mg at 11/05/22 0821 **OR** morphine injection 4 mg, 4 mg, Intravenous, Q4H PRN, Chanel Sotelo PA-C, 4 mg at 11/05/22 1952  •  nitroglycerin (NITROSTAT) SL tablet 0 4 mg, 0 4 mg, Sublingual, Q5 Min PRN, Vero Cooley MD  •  ondansetron (ZOFRAN) injection 4 mg, 4 mg, Intravenous, Q4H PRN, Vero Cooley MD  •  polyethylene glycol (MIRALAX) packet 17 g, 17 g, Oral, Daily PRN, Elsie Schwartz PA-C  •  senna-docusate sodium (SENOKOT S) 8 6-50 mg per tablet 2 tablet, 2 tablet, Oral, Daily, Elsie Schwartz PA-C, 2 tablet at 11/07/22 1359  •  zolpidem (AMBIEN) tablet 10 mg, 10 mg, Oral, HS, Chanel Sotelo PA-C, 10 mg at 11/06/22 2328     Labs & Results:     Results from last 7 days   Lab Units 11/04/22  2204 11/04/22 2010 11/04/22  1743   HS TNI 0HR ng/L  --   --  4   HS TNI 2HR ng/L  --  5  --    HSTNI D2 ng/L  --  1  --    HS TNI 4HR ng/L 6  --   --    HSTNI D4 ng/L 2  --   --      Results from last 7 days   Lab Units 11/07/22  0506 11/06/22  0443 11/04/22  1743   WBC Thousand/uL 12 07* 10 76* 13 32*   HEMOGLOBIN g/dL 13 1 13 6 13 5   HEMATOCRIT % 40 2 41 7 40 7   PLATELETS Thousands/uL 267 300 309         Results from last 7 days   Lab Units 11/07/22  0506 22  1743   POTASSIUM mmol/L 3 9 3 8   CHLORIDE mmol/L 104 100   CO2 mmol/L 26 26   BUN mg/dL 15 16   CREATININE mg/dL 0 83 0 94   CALCIUM mg/dL 9 6 9 6   ALK PHOS U/L  --  104   ALT U/L  --  20   AST U/L  --  19               Vitals: Blood pressure 167/87, pulse 67, temperature 97 9 °F (36 6 °C), resp  rate 17, height 4' 11" (1 499 m), weight 109 kg (240 lb), SpO2 94 %, not currently breastfeeding , Body mass index is 48 47 kg/m² ,   Orthostatic Blood Pressures    Flowsheet Row Most Recent Value   Blood Pressure 167/87 filed at 2022 1203   Patient Position - Orthostatic VS Lying filed at 2022 9650          Systolic (21OIN), BT , Min:156 , VFP:923     Diastolic (85HXC), SIT:86, Min:82, Max:99        Intake/Output Summary (Last 24 hours) at 2022 1507  Last data filed at 2022 0915  Gross per 24 hour   Intake 360 ml   Output --   Net 360 ml       Invasive Devices  Report    Peripheral Intravenous Line  Duration           Peripheral IV 22 Right Forearm 2 days                  EKG: Normal sinus rhythm    Telemetry:  Telemetry Orders (From admission, onward)             48 Hour Telemetry Monitoring  Continuous x 48 hours        References:    Telemetry Guidelines   Question:  Reason for 48 Hour Telemetry  Answer:  Acute MI, chest pain - R/O MI, or unstable angina                 Telemetry Reviewed: Normal Sinus Rhythm  Indication for Continued Telemetry Use: No indication for continued use  Will discontinue       BP Readings from Last 3 Encounters:   22 167/87   22 124/78   22 120/78      Wt Readings from Last 3 Encounters:   22 109 kg (240 lb)   22 99 3 kg (219 lb)   22 98 4 kg (217 lb)

## 2022-11-07 NOTE — UTILIZATION REVIEW
Continued Stay Review    Date:    11/7/22                          Current Patient Class:   Observation  Current Level of Care:   Med surg    HPI:67 y o  female initially admitted on  11/4  OBSERVATION  @  2230  With  Chest  pain    Assessment/Plan:   11/7    Waiting stress test   Has some mild L sided  Chest pressure, non radiating, not as severe as  Admission  Pain          Vital Signs:    97 9-67-17      185/84          Pertinent Labs/Diagnostic Results:       Results from last 7 days   Lab Units 11/07/22  0506 11/06/22  0443 11/04/22  1743   WBC Thousand/uL 12 07* 10 76* 13 32*   HEMOGLOBIN g/dL 13 1 13 6 13 5   HEMATOCRIT % 40 2 41 7 40 7   PLATELETS Thousands/uL 267 300 309   NEUTROS ABS Thousands/µL 7 54 6 67 9 87*         Results from last 7 days   Lab Units 11/07/22  0506 11/04/22  1743   SODIUM mmol/L 140 137   POTASSIUM mmol/L 3 9 3 8   CHLORIDE mmol/L 104 100   CO2 mmol/L 26 26   ANION GAP mmol/L 10 11   BUN mg/dL 15 16   CREATININE mg/dL 0 83 0 94   EGFR ml/min/1 73sq m 72 62   CALCIUM mg/dL 9 6 9 6     Results from last 7 days   Lab Units 11/04/22  1743   AST U/L 19   ALT U/L 20   ALK PHOS U/L 104   TOTAL PROTEIN g/dL 7 9   ALBUMIN g/dL 3 7   TOTAL BILIRUBIN mg/dL 0 47         Results from last 7 days   Lab Units 11/07/22  0506 11/04/22  1743   GLUCOSE RANDOM mg/dL 112 125               Results from last 7 days   Lab Units 11/04/22  2204 11/04/22 2010 11/04/22  1743   HS TNI 0HR ng/L  --   --  4   HS TNI 2HR ng/L  --  5  --    HSTNI D2 ng/L  --  1  --    HS TNI 4HR ng/L 6  --   --    HSTNI D4 ng/L 2  --   --                    Results from last 7 days   Lab Units 11/04/22  1743   SED RATE mm/hour 40*           Medications:   Scheduled Medications:  amLODIPine, 5 mg, Oral, Daily  aspirin, 81 mg, Oral, Daily  atorvastatin, 80 mg, Oral, Daily With Dinner  clopidogrel, 75 mg, Oral, Daily  cyanocobalamin, 100 mcg, Oral, Daily  enoxaparin, 40 mg, Subcutaneous, BID  fish oil, 1,000 mg, Oral, TID  lidocaine, 1 patch, Topical, Daily  losartan, 25 mg, Oral, Daily  metoprolol succinate, 25 mg, Oral, Daily  senna-docusate sodium, 2 tablet, Oral, Daily  zolpidem, 10 mg, Oral, HS      Continuous IV Infusions:     PRN Meds:  acetaminophen, 650 mg, Oral, Q6H PRN  albuterol, 2 5 mg, Nebulization, Q4H PRN  ALPRAZolam, 0 25 mg, Oral, HS PRN  morphine injection, 2 mg, Intravenous, Q4H PRN   Or  morphine injection, 4 mg, Intravenous, Q4H PRN  nitroglycerin, 0 4 mg, Sublingual, Q5 Min PRN  ondansetron, 4 mg, Intravenous, Q4H PRN  polyethylene glycol, 17 g, Oral, Daily PRN        Discharge Plan:    D    Network Utilization Review Department  ATTENTION: Please call with any questions or concerns to [phone] and carefully listen to the prompts so that you are directed to the right person  All voicemails are confidential   Malka Hendrix all requests for admission clinical reviews, approved or denied determinations and any other requests to dedicated fax number below belonging to the campus where the patient is receiving treatment   List of dedicated fax numbers for the Facilities:  [address] DENIALS (Administrative/Medical Necessity) [phone]   [address] (Maternity/NICU/Pediatrics) [phone]   [address] Hawk [phone]   [facility] [address] [phone]   [address] [address] [facility] [place] [address] [phone]   [facility] 134 815 [facility] [phone]

## 2022-11-07 NOTE — ASSESSMENT & PLAN NOTE
· Patient presented to the ED with complaints of left-sided chest pain that has been intermittent x 2 days with improvement s/p topical nitroglycerin   · Hx of MI in 1998 with bypass, 3 stents placed since on 3 different occasions  Last stent placed in 2012  · KAY score 5  · EKG with no acute ST-T changes    · Troponin negative x 3  • Cardiology consult, appreciate input  o Plan for stress test today  o Echocardiogram 11/6: LVEF 60%, no RWMA  o Monitor on telemetry

## 2022-11-07 NOTE — PROGRESS NOTES
[address] [facility]  Progress Note - Merle Newer 1955, 69 y o  female MRN: [id number]  Unit/Bed#: -01 Encounter: [id number]  Primary Care Provider: Solomon Muñiz MD   Date and time admitted to hospital: 11/4/2022  5:39 PM    * Chest pain  Assessment & Plan  · Patient presented to the ED with complaints of left-sided chest pain that has been intermittent x 2 days with improvement s/p topical nitroglycerin   · Hx of MI in 1998 with bypass, 3 stents placed since on 3 different occasions  Last stent placed in 2012  · KAY score 5  · EKG with no acute ST-T changes  · Troponin negative x 3  • Cardiology consult, appreciate input  o Plan for stress test today  o Echocardiogram 11/6: LVEF 60%, no RWMA  o Monitor on telemetry    HTN (hypertension)  Assessment & Plan  · Elevated BP on arrival 185/122  · Fluctuating since presentation  · Continue home amlodipine, losartan   · Monitor per unit protocol     Headache  Assessment & Plan  · Now resolved  · Prn tylenol       VTE Pharmacologic Prophylaxis: VTE Score: 4 Moderate Risk (Score 3-4) - Pharmacological DVT Prophylaxis Ordered: enoxaparin (Lovenox)  Patient Centered Rounds: I performed bedside rounds with nursing staff today  Discussions with Specialists or Other Care Team Provider: CM, Cardiology    Education and Discussions with Family / Patient: Updated  (mutiple family members ) at bedside  Time Spent for Care: 30 minutes  More than 50% of total time spent on counseling and coordination of care as described above  Current Length of Stay: 0 day(s)  Current Patient Status: Observation   Certification Statement: The patient, admitted on an observation basis, will now require > 2 midnight hospital stay due to pending stress test results  Discharge Plan: Anticipate discharge in 24-48 hrs to home  Code Status: Level 1 - Full Code    Subjective:   Patient seen sitting at the edge of the bed   Recently returned from stress test  Reports mild L-sided chest "pressure" that is non-radiating, but notes it is not as severe as the chest pain she was experiencing upon admission  Denies difficulty breathing, fevers  Reports constipation, requesting laxative/stool softener  Objective:     Vitals:   Temp (24hrs), Av 2 °F (36 8 °C), Min:97 9 °F (36 6 °C), Max:98 6 °F (37 °C)    Temp:  [97 9 °F (36 6 °C)-98 6 °F (37 °C)] 97 9 °F (36 6 °C)  HR:  [66-87] 67  Resp:  [16-18] 17  BP: (141-186)/(77-99) 185/84  SpO2:  [93 %-95 %] 94 %  Body mass index is 48 47 kg/m²  Input and Output Summary (last 24 hours): Intake/Output Summary (Last 24 hours) at 2022 0742  Last data filed at 2022 1904  Gross per 24 hour   Intake 360 ml   Output --   Net 360 ml       Physical Exam:   Physical Exam  Vitals reviewed  Constitutional:       General: She is not in acute distress  Appearance: She is obese  She is not ill-appearing  HENT:      Head: Normocephalic and atraumatic  Eyes:      Conjunctiva/sclera: Conjunctivae normal    Cardiovascular:      Rate and Rhythm: Normal rate  Heart sounds: Normal heart sounds  Pulmonary:      Effort: Pulmonary effort is normal  No respiratory distress  Abdominal:      General: There is no distension  Tenderness: There is no abdominal tenderness  Skin:     General: Skin is warm and dry  Neurological:      General: No focal deficit present  Mental Status: She is alert     Psychiatric:         Mood and Affect: Mood normal          Behavior: Behavior normal         Additional Data:     Labs:  Results from last 7 days   Lab Units 22  0506   WBC Thousand/uL 12 07*   HEMOGLOBIN g/dL 13 1   HEMATOCRIT % 40 2   PLATELETS Thousands/uL 267   NEUTROS PCT % 63   LYMPHS PCT % 26   MONOS PCT % 9   EOS PCT % 2     Results from last 7 days   Lab Units 22  0506 22  1743   SODIUM mmol/L 140 137   POTASSIUM mmol/L 3 9 3 8   CHLORIDE mmol/L 104 100   CO2 mmol/L 26 26   BUN mg/dL 15 16   CREATININE mg/dL 0 83 0 94   ANION GAP mmol/L 10 11   CALCIUM mg/dL 9 6 9 6   ALBUMIN g/dL  --  3 7   TOTAL BILIRUBIN mg/dL  --  0 47   ALK PHOS U/L  --  104   ALT U/L  --  20   AST U/L  --  19   GLUCOSE RANDOM mg/dL 112 125                       Lines/Drains:  Invasive Devices  Report    Peripheral Intravenous Line  Duration           Peripheral IV 22 Right Forearm 1 day                  Telemetry:  Telemetry Orders (From admission, onward)             48 Hour Telemetry Monitoring  Continuous x 48 hours           References:    Telemetry Guidelines   Question:  Reason for 48 Hour Telemetry  Answer:  Acute MI, chest pain - R/O MI, or unstable angina                 Telemetry Reviewed: Normal Sinus Rhythm  Indication for Continued Telemetry Use: Acute MI/Unstable Angina/Rule out ACS             Imaging: Reviewed radiology reports from this admission including: chest xray    Recent Cultures (last 7 days):         Last 24 Hours Medication List:   Current Facility-Administered Medications   Medication Dose Route Frequency Provider Last Rate   • acetaminophen  650 mg Oral Q6H PRN Rowdy Piedra MD     • albuterol  2 5 mg Nebulization Q4H PRN Chanel Sotelo PA-C     • ALPRAZolam  0 25 mg Oral HS PRN Chanel Sotelo PA-C     • amLODIPine  5 mg Oral Daily Chanel Sotelo PA-C     • aspirin  81 mg Oral Daily Chanel Sotelo PA-C     • atorvastatin  80 mg Oral Daily With [employer], MADONNA     • clopidogrel  75 mg Oral Daily Chanel Sotelo PA-C     • cyanocobalamin  100 mcg Oral Daily Chanel Sotelo PA-C     • enoxaparin  40 mg Subcutaneous BID Chanel Sotelo PA-C     • fish oil  1,000 mg [facility] TID [facility]     • lidocaine  1 patch Topical Daily Chanel Sotelo PA-C     • losartan  25 mg Oral Daily Chanel Sotelo PA-C     • metoprolol succinate  25 mg Oral Daily Chanel Sotelo PA-C     • morphine injection  2 mg Intravenous Q4H PRN Chanel Sotelo PA-C      Or   • morphine injection  4 mg Intravenous Q4H PRN Chanel Sotelo PA-C     • nitroglycerin  0 4 mg Sublingual Q5 Min PRN Cheli Fiore MD     • ondansetron  4 mg Intravenous Q4H PRN Cheli Fiore MD     • zolpidem  10 mg Oral HS Chanel Sotelo PA-C          Today, Patient Was Seen By: Sravani Sagastume    **Please Note: This note may have been constructed using a voice recognition system  **

## 2022-11-07 NOTE — ASSESSMENT & PLAN NOTE
· Patient presented to the ED with complaints of left-sided chest pain that has been intermittent x 2 days with improvement s/p topical nitroglycerin   · Hx of MI in 1998 with bypass, 3 stents placed since on 3 different occasions  Last stent placed in 2012  · KAY score 5  · EKG with no acute ST-T changes    · Troponin negative x 3  • Cardiology consult, appreciate input  o Stress test negative for inducible ischemia, stable for discharge  o Start Imdur 30 mg daily  o Follow up with Dr Ezra Cambpell within the month   o Echocardiogram 11/6: LVEF 60%, no RWMA

## 2022-11-07 NOTE — ASSESSMENT & PLAN NOTE
· Elevated BP on arrival 185/122  · Fluctuating since presentation  · Continue home amlodipine, losartan   · Monitor per unit protocol

## 2022-11-07 NOTE — ASSESSMENT & PLAN NOTE
· Elevated BP on arrival 185/122  · Fluctuating since presentation  · Continue home amlodipine, losartan   · Should improve on Imdur, monitor

## 2022-11-11 RX ORDER — NYSTATIN 100000 U/G
CREAM TOPICAL
COMMUNITY
Start: 2022-09-08

## 2022-11-11 RX ORDER — LOSARTAN POTASSIUM 50 MG/1
TABLET ORAL
COMMUNITY
Start: 2022-08-16

## 2022-11-11 RX ORDER — DULOXETIN HYDROCHLORIDE 30 MG/1
CAPSULE, DELAYED RELEASE ORAL
COMMUNITY
Start: 2022-09-13 | End: 2022-11-16

## 2022-11-16 ENCOUNTER — OFFICE VISIT (OUTPATIENT)
Dept: GASTROENTEROLOGY | Facility: CLINIC | Age: 67
End: 2022-11-16

## 2022-11-16 VITALS
BODY MASS INDEX: 43.95 KG/M2 | SYSTOLIC BLOOD PRESSURE: 134 MMHG | OXYGEN SATURATION: 95 % | DIASTOLIC BLOOD PRESSURE: 80 MMHG | HEART RATE: 65 BPM | WEIGHT: 218 LBS | HEIGHT: 59 IN

## 2022-11-16 DIAGNOSIS — K62.5 RECTAL BLEEDING: ICD-10-CM

## 2022-11-16 DIAGNOSIS — K59.00 CONSTIPATION, UNSPECIFIED CONSTIPATION TYPE: ICD-10-CM

## 2022-11-16 DIAGNOSIS — R11.0 NAUSEA: Primary | ICD-10-CM

## 2022-11-16 RX ORDER — METOPROLOL SUCCINATE 50 MG/1
TABLET, EXTENDED RELEASE ORAL
COMMUNITY
Start: 2022-10-18

## 2022-11-16 RX ORDER — VILAZODONE HYDROCHLORIDE 10 MG/1
TABLET ORAL
COMMUNITY
Start: 2022-11-09

## 2022-11-16 RX ORDER — FAMOTIDINE 40 MG/1
40 TABLET, FILM COATED ORAL DAILY
Qty: 30 TABLET | Refills: 3 | Status: SHIPPED | OUTPATIENT
Start: 2022-11-16

## 2022-11-16 NOTE — LETTER
November 16, 2022     MD Jatinder [address]ne [address] [address]    Patient: Emma Sinha   YOB: 1955   Date of Visit: 11/16/2022       Dear Dr Rosario Barksdale:    Thank you for referring Emma Sinha to me for evaluation  Below are my notes for this consultation  If you have questions, please do not hesitate to call me  I look forward to following your patient along with you  Sincerely,        Joselito Slater PA-C        CC: No Recipients  Thea Meadows  11/16/2022 10:27 AM  Sign when Signing Visit  [facility] - Outpatient Follow-up Note  Emma Sinha 69 y o  female MRN: [id number]  Encounter: [id number]          ASSESSMENT AND PLAN:      1  Nausea  2  Constipation, unspecified constipation type  3  Rectal bleeding  -Will start patient on prescription famotidine 40 mg daily   -High-fiber diet given and reviewed  -Patient will start Benefiber daily   -Will start Linzess 72 mcg daily  -No plans for repeat endoscopic evaluation at this time     -Follow-up in 4-6 weeks  ______________________________________________________________________    SUBJECTIVE:    56-year-old female very well known to us with a past medical history significant for irritable bowel syndrome constipation predominance and gastroesophageal reflux disease  Patient unfortunately was just recently at [facility] with chest pain  Patient reports her chest pain has stopped and she is following with her cardiologist today  She does suffer from acid reflux disease but reports her reflux has been under control  She does report nausea but no vomiting  She does report chronic constipation  She reports that she is trying to eat plenty of fiber and has decreased her carbohydrates and fats  She reports she does not drink enough water throughout the course of the day  She does report she will have occasional rectal bleeding with her constipation    She denies any melena  Patient's last EGD and colonoscopy were in 2019  Both these examinations were normal   Patient does have a history of colon polyps in the past     REVIEW OF SYSTEMS IS OTHERWISE NEGATIVE  Historical Information   Past Medical History:   Diagnosis Date   • Angina at rest [facility])    • Arthritis      • Back problem    • Diabetes ([facility] [address] )    • Gall stones    • Heart attack ([facility] [address] )    • High blood pressure    • Stomach problems      Past Surgical History:   Procedure Laterality Date   • CORONARY STENT PLACEMENT      x3   • GALLBLADDER SURGERY     • HYSTERECTOMY     • KNEE SURGERY Right    • OTHER SURGICAL HISTORY      Double Bypass      Social History   Social History     Substance and Sexual Activity   Alcohol Use Not Currently     Social History     Substance and Sexual Activity   Drug Use No     Social History     Tobacco Use   Smoking Status Former   Smokeless Tobacco Never     Family History   Problem Relation Age of Onset   • Breast cancer Mother 46   • Colon cancer Father    • Breast cancer Maternal Aunt    • Ovarian cancer Maternal Aunt    • Breast cancer Maternal Aunt    • Cervical cancer Maternal Aunt    • Uterine cancer Maternal Aunt    • No Known Problems Sister    • No Known Problems Daughter    • No Known Problems Sister    • No Known Problems Sister        Meds/Allergies        Current Outpatient Medications:   •  acetaminophen (TYLENOL) 325 mg tablet  •  albuterol (2 5 mg/3 mL) 0 083 % nebulizer solution  •  ALPRAZolam (XANAX) 0 25 mg tablet  •  amLODIPine (NORVASC) 5 mg tablet  •  aspirin 81 MG tablet  •  atorvastatin (LIPITOR) 80 mg tablet  •  calcium-vitamin D 250-100 MG-UNIT per tablet  •  clopidogrel (PLAVIX) 75 mg tablet  •  clotrimazole-betamethasone (LOTRISONE) 1-0 05 % cream  •  cyanocobalamin (VITAMIN B-12) 100 mcg tablet  •  famotidine (PEPCID) 40 MG tablet  •  guaiFENesin (ROBITUSSIN) 100 MG/5ML oral liquid  •  isosorbide mononitrate (IMDUR) 30 mg 24 hr tablet  •  losartan (COZAAR) 50 mg tablet  •  metoprolol succinate (TOPROL-XL) 25 mg 24 hr tablet  •  metoprolol succinate (TOPROL-XL) 50 mg 24 hr tablet  •  nitroglycerin (NITROSTAT) 0 4 mg SL tablet  •  nystatin (MYCOSTATIN) cream  •  Omega-3 1000 MG CAPS  •  terconazole (TERAZOL 7) 0 4 % vaginal cream  •  traMADol (ULTRAM) 50 mg tablet  •  vilazodone (VIIBRYD) 10 mg tablet  •  zolpidem (AMBIEN) 10 mg tablet  •  conjugated estrogens (PREMARIN) vaginal cream    Allergies   Allergen Reactions   • Wound Dressing Adhesive      Tape    • Wound Dressings Rash     Swelling, red           Objective      Blood pressure 134/80, pulse 65, height 4' 11" (1 499 m), weight 98 9 kg (218 lb), SpO2 95 %, not currently breastfeeding  Body mass index is 44 03 kg/m²  PHYSICAL EXAM:      General Appearance:   Alert, cooperative, no distress   HEENT:   Normocephalic, atraumatic, anicteric      Neck:  Supple, symmetrical, trachea midline   Lungs:   Clear to auscultation bilaterally; no rales, rhonchi or wheezing; respirations unlabored    Heart[de-identified]   Regular rate and rhythm; no murmur, rub, or gallop  Abdomen:   Soft, non-tender, non-distended; normal bowel sounds; no masses, no organomegaly    Genitalia:   Deferred    Rectal:   Deferred    Extremities:  No cyanosis, clubbing or edema    Pulses:  2+ and symmetric    Skin:  No jaundice, rashes, or lesions    Lymph nodes:  No palpable cervical lymphadenopathy        Lab Results:   No visits with results within 1 Day(s) from this visit     Latest known visit with results is:   Admission on 11/04/2022, Discharged on 11/07/2022   Component Date Value   • WBC 11/04/2022 13 32 (H)    • RBC 11/04/2022 4 29    • Hemoglobin 11/04/2022 13 5    • Hematocrit 11/04/2022 40 7    • MCV 11/04/2022 95    • MCH 11/04/2022 31 5    • MCHC 11/04/2022 33 2    • RDW 11/04/2022 12 6    • MPV 11/04/2022 8 8 (L)    • Platelets 06/37/3751 309    • nRBC 11/04/2022 0    • Neutrophils Relative 11/04/2022 75    • Immat GRANS % 11/04/2022 0    • Lymphocytes Relative 11/04/2022 18    • Monocytes Relative 11/04/2022 6    • Eosinophils Relative 11/04/2022 1    • Basophils Relative 11/04/2022 0    • Neutrophils Absolute 11/04/2022 9 87 (H)    • Immature Grans Absolute 11/04/2022 0 04    • Lymphocytes Absolute 11/04/2022 2 43    • Monocytes Absolute 11/04/2022 0 84    • Eosinophils Absolute 11/04/2022 0 10    • Basophils Absolute 11/04/2022 0 04    • Sodium 11/04/2022 137    • Potassium 11/04/2022 3 8    • Chloride 11/04/2022 100    • CO2 11/04/2022 26    • ANION GAP 11/04/2022 11    • BUN 11/04/2022 16    • Creatinine 11/04/2022 0 94    • Glucose 11/04/2022 125    • Calcium 11/04/2022 9 6    • AST 11/04/2022 19    • ALT 11/04/2022 20    • Alkaline Phosphatase 11/04/2022 104    • Total Protein 11/04/2022 7 9    • Albumin 11/04/2022 3 7    • Total Bilirubin 11/04/2022 0 47    • eGFR 11/04/2022 62    • hs TnI 0hr 11/04/2022 4    • Ventricular Rate 11/04/2022 69    • Atrial Rate 11/04/2022 250    • QRSD Interval 11/04/2022 92    • QT Interval 11/04/2022 440    • QTC Interval 11/04/2022 471    • P Axis 11/04/2022 58    • QRS Axis 11/04/2022 43    • T Wave Axis 11/04/2022 76    • hs TnI 2hr 11/04/2022 5    • Delta 2hr hsTnI 11/04/2022 1    • hs TnI 4hr 11/04/2022 6    • Delta 4hr hsTnI 11/04/2022 2    • Ventricular Rate 11/04/2022 73    • Atrial Rate 11/04/2022 73    • DE Interval 11/04/2022 194    • QRSD Interval 11/04/2022 90    • QT Interval 11/04/2022 428    • QTC Interval 11/04/2022 471    • P Axis 11/04/2022 66    • QRS Axis 11/04/2022 54    • T Wave Axis 11/04/2022 83    • Sed Rate 11/04/2022 40 (H)    • LA size 11/06/2022 3 9    • Triscuspid Valve Regurgi* 11/06/2022 29 0    • Tricuspid valve peak reg* 11/06/2022 2 71    • LVPWd 11/06/2022 1 30    • Left Atrium Area-systoli* 11/06/2022 19 1    • Left Atrium Area-systoli* 11/06/2022 16 5    • MV E' Tissue Velocity Se* 11/06/2022 10    • RA 2D Volume 11/06/2022 22 0    • TR Peak Axel 11/06/2022 2 7    • IVSd 11/06/2022 4 45    • LV DIASTOLIC VOLUME (MOD* 52/34/4548 108    • LEFT VENTRICLE SYSTOLIC * 74/78/5118 49    • Left ventricular stroke * 11/06/2022 59 00    • A4C EF 11/06/2022 63    • LA length (A2C) 11/06/2022 5 00    • LVIDd 11/06/2022 4 80    • IVS 11/06/2022 1 3    • LVIDS 11/06/2022 3 50    • FS 11/06/2022 27    • Asc Ao 11/06/2022 3 2    • Ao root 11/06/2022 3 40    • RVID d 11/06/2022 3 2    • MV valve area p 1/2 meth* 11/06/2022 3 06    • E wave deceleration time 11/06/2022 248    • MV Peak E Axel 11/06/2022 85    • MV Peak A Axel 11/06/2022 0 91    • RAA A4C 11/06/2022 10 9    • MV stenosis pressure 1/2* 11/06/2022 72    • LVSV, 2D 11/06/2022 59    • LV EF 11/06/2022 60    • WBC 11/06/2022 10 76 (H)    • RBC 11/06/2022 4 32    • Hemoglobin 11/06/2022 13 6    • Hematocrit 11/06/2022 41 7    • MCV 11/06/2022 97    • MCH 11/06/2022 31 5    • MCHC 11/06/2022 32 6    • RDW 11/06/2022 12 8    • MPV 11/06/2022 9 2    • Platelets 35/79/9297 300    • nRBC 11/06/2022 0    • Neutrophils Relative 11/06/2022 61    • Immat GRANS % 11/06/2022 0    • Lymphocytes Relative 11/06/2022 27    • Monocytes Relative 11/06/2022 9    • Eosinophils Relative 11/06/2022 2    • Basophils Relative 11/06/2022 1    • Neutrophils Absolute 11/06/2022 6 67    • Immature Grans Absolute 11/06/2022 0 04    • Lymphocytes Absolute 11/06/2022 2 90    • Monocytes Absolute 11/06/2022 0 93    • Eosinophils Absolute 11/06/2022 0 17    • Basophils Absolute 11/06/2022 0 05    • Baseline HR 11/07/2022 74    • Baseline BP 11/07/2022 163/74    • O2 sat rest 11/07/2022 96    • Stress peak HR 11/07/2022 92    • Post peak BP 11/07/2022 157    • Rate Pressure Product 11/07/2022 14,444 0    • O2 sat peak 11/07/2022 96    • Recovery HR 11/07/2022 74    • Recovery BP 11/07/2022 144/72    • Max HR 11/07/2022 92    • Rest Nuclear Isotope Dose 11/07/2022 11 00    • Stress Nuclear Isotope D* 11/07/2022 33 00    • ST Depression (mm) 11/07/2022 0    • WBC 11/07/2022 12 07 (H)    • RBC 11/07/2022 4 17    • Hemoglobin 11/07/2022 13 1    • Hematocrit 11/07/2022 40 2    • MCV 11/07/2022 96    • MCH 11/07/2022 31 4    • MCHC 11/07/2022 32 6    • RDW 11/07/2022 12 7    • MPV 11/07/2022 9 0    • Platelets 21/04/7497 267    • nRBC 11/07/2022 0    • Neutrophils Relative 11/07/2022 63    • Immat GRANS % 11/07/2022 0    • Lymphocytes Relative 11/07/2022 26    • Monocytes Relative 11/07/2022 9    • Eosinophils Relative 11/07/2022 2    • Basophils Relative 11/07/2022 0    • Neutrophils Absolute 11/07/2022 7 54    • Immature Grans Absolute 11/07/2022 0 05    • Lymphocytes Absolute 11/07/2022 3 18    • Monocytes Absolute 11/07/2022 1 06    • Eosinophils Absolute 11/07/2022 0 19    • Basophils Absolute 11/07/2022 0 05    • Sodium 11/07/2022 140    • Potassium 11/07/2022 3 9    • Chloride 11/07/2022 104    • CO2 11/07/2022 26    • ANION GAP 11/07/2022 10    • BUN 11/07/2022 15    • Creatinine 11/07/2022 0 83    • Glucose 11/07/2022 112    • Glucose, Fasting 11/07/2022 112 (H)    • Calcium 11/07/2022 9 6    • eGFR 11/07/2022 72    • Protocol Name 11/07/2022 LEXISCAN-SIT    • Time In Exercise Phase 11/07/2022 00:03:00    • MAX  SYSTOLIC BP 64/02/5088 026    • Max Diastolic Bp 47/90/1710 74    • Max Heart Rate 11/07/2022 92    • Max Predicted Heart Rate 11/07/2022 151    • Reason for Termination 11/07/2022 Protocol Complete    • Test Indication 11/07/2022 CHEST PAIN, SOB    • Target Hr Formular 11/07/2022 (220 - Age)*85%    • Arrhy During Ex 11/07/2022 ventricular premature beats-isolated    • Chest Pain Statement 11/07/2022 none          Radiology Results:   XR chest 2 views    Result Date: 11/5/2022  Narrative: CHEST INDICATION:   Chest Pain  COMPARISON:  03/15/2022 EXAM PERFORMED/VIEWS:  XR CHEST PA & LATERAL  The frontal view was performed utilizing dual energy radiographic technique  Images: 4 FINDINGS: Cardiomediastinal silhouette appears unremarkable    A median sternotomy has been performed  The lungs are clear  No pneumothorax or pleural effusion  Osseous structures appear within normal limits for patient age  A cholecystectomy has been performed  Impression: No acute cardiopulmonary disease  Workstation performed: [id number]     Stress strip    Result Date: 11/7/2022  Narrative: Confirmed by Dionicio Hunt ([id number]),  Sarah Harris ([id number]) on 82/1/6886 10:40:20 AM    Echo complete w/ contrast if indicated    Result Date: 11/6/2022  Narrative: •  Left Ventricle: Left ventricular cavity size is normal  Wall thickness is mildly increased  There is mild concentric hypertrophy  The left ventricular ejection fraction is 60%  Systolic function is normal  Wall motion is normal  Diastolic function is normal  •  Mitral Valve: There is mild annular calcification  •  Tricuspid Valve: There is mild regurgitation  NM myocardial perfusion spect (rx stress and/or rest)    Result Date: 11/7/2022  Narrative: •  Stress ECG: No ST deviation is noted  The ECG was not diagnostic due to pharmacological (vasodilator) stress  •  Stress Function: Left ventricular function post-stress is normal  •  Perfusion: Perfusion defects in the inferior and distal anterior wall seen in the rest and supine stress images were not seen in the prone stress images indicating that they were likely diaphragmatic and breast attenuation artifacts respectively  •  Stress Combined Conclusion: The ECG and SPECT imaging portions of the stress study are concordant with no evidence of stress induced myocardial ischemia

## 2022-11-16 NOTE — PROGRESS NOTES
[facility] - Outpatient Follow-up Note  Landy Amezcua 69 y o  female MRN: [id number]  Encounter: [id number]          ASSESSMENT AND PLAN:      1  Nausea  2  Constipation, unspecified constipation type  3  Rectal bleeding  -Will start patient on prescription famotidine 40 mg daily   -High-fiber diet given and reviewed  -Patient will start Benefiber daily   -Will start Linzess 72 mcg daily  -No plans for repeat endoscopic evaluation at this time     -Follow-up in 4-6 weeks  ______________________________________________________________________    SUBJECTIVE:    68-year-old female very well known to us with a past medical history significant for irritable bowel syndrome constipation predominance and gastroesophageal reflux disease  Patient unfortunately was just recently at [facility] [place] with chest pain  Patient reports her chest pain has stopped and she is following with her cardiologist today  She does suffer from acid reflux disease but reports her reflux has been under control  She does report nausea but no vomiting  She does report chronic constipation  She reports that she is trying to eat plenty of fiber and has decreased her carbohydrates and fats  She reports she does not drink enough water throughout the course of the day  She does report she will have occasional rectal bleeding with her constipation  She denies any melena  Patient's last EGD and colonoscopy were in 2019  Both these examinations were normal   Patient does have a history of colon polyps in the past     REVIEW OF SYSTEMS IS OTHERWISE NEGATIVE  Historical Information   Past Medical History:   Diagnosis Date   • Angina at rest [facility])    • Arthritis      • Back problem    • Diabetes ([facility] [address] )    • Gall stones    • [facility] attack ([facility] )    • High blood pressure    • Stomach problems      Past Surgical History:   Procedure Laterality Date   • CORONARY STENT PLACEMENT      x3   • GALLBLADDER SURGERY • HYSTERECTOMY     • KNEE SURGERY Right    • OTHER SURGICAL HISTORY      Double Bypass      Social History   Social History     Substance and Sexual Activity   Alcohol Use Not Currently     Social History     Substance and Sexual Activity   Drug Use No     Social History     Tobacco Use   Smoking Status Former   Smokeless Tobacco Never     Family History   Problem Relation Age of Onset   • Breast cancer Mother 46   • Colon cancer Father    • Breast cancer Maternal Aunt    • Ovarian cancer Maternal Aunt    • Breast cancer Maternal Aunt    • Cervical cancer Maternal Aunt    • Uterine cancer Maternal Aunt    • No Known Problems Sister    • No Known Problems Daughter    • No Known Problems Sister    • No Known Problems Sister        Meds/Allergies       Current Outpatient Medications:   •  acetaminophen (TYLENOL) 325 mg tablet  •  albuterol (2 5 mg/3 mL) 0 083 % nebulizer solution  •  ALPRAZolam (XANAX) 0 25 mg tablet  •  amLODIPine (NORVASC) 5 mg tablet  •  aspirin 81 MG tablet  •  atorvastatin (LIPITOR) 80 mg tablet  •  calcium-vitamin D 250-100 MG-UNIT per tablet  •  clopidogrel (PLAVIX) 75 mg tablet  •  clotrimazole-betamethasone (LOTRISONE) 1-0 05 % cream  •  cyanocobalamin (VITAMIN B-12) 100 mcg tablet  •  famotidine (PEPCID) 40 MG tablet  •  guaiFENesin (ROBITUSSIN) 100 MG/5ML oral liquid  •  isosorbide mononitrate (IMDUR) 30 mg 24 hr tablet  •  losartan (COZAAR) 50 mg tablet  •  metoprolol succinate (TOPROL-XL) 25 mg 24 hr tablet  •  metoprolol succinate (TOPROL-XL) 50 mg 24 hr tablet  •  nitroglycerin (NITROSTAT) 0 4 mg SL tablet  •  nystatin (MYCOSTATIN) cream  •  Omega-3 1000 MG CAPS  •  terconazole (TERAZOL 7) 0 4 % vaginal cream  •  traMADol (ULTRAM) 50 mg tablet  •  vilazodone (VIIBRYD) 10 mg tablet  •  zolpidem (AMBIEN) 10 mg tablet  •  conjugated estrogens (PREMARIN) vaginal cream    Allergies   Allergen Reactions   • Wound Dressing Adhesive      Tape    • Wound Dressings Rash     Swelling, red Objective     Blood pressure 134/80, pulse 65, height 4' 11" (1 499 m), weight 98 9 kg (218 lb), SpO2 95 %, not currently breastfeeding  Body mass index is 44 03 kg/m²  PHYSICAL EXAM:      General Appearance:   Alert, cooperative, no distress   HEENT:   Normocephalic, atraumatic, anicteric      Neck:  Supple, symmetrical, trachea midline   Lungs:   Clear to auscultation bilaterally; no rales, rhonchi or wheezing; respirations unlabored    Heart[de-identified]   Regular rate and rhythm; no murmur, rub, or gallop  Abdomen:   Soft, non-tender, non-distended; normal bowel sounds; no masses, no organomegaly    Genitalia:   Deferred    Rectal:   Deferred    Extremities:  No cyanosis, clubbing or edema    Pulses:  2+ and symmetric    Skin:  No jaundice, rashes, or lesions    Lymph nodes:  No palpable cervical lymphadenopathy        Lab Results:   No visits with results within 1 Day(s) from this visit     Latest known visit with results is:   Admission on 11/04/2022, Discharged on 11/07/2022   Component Date Value   • WBC 11/04/2022 13 32 (H)    • RBC 11/04/2022 4 29    • Hemoglobin 11/04/2022 13 5    • Hematocrit 11/04/2022 40 7    • MCV 11/04/2022 95    • MCH 11/04/2022 31 5    • MCHC 11/04/2022 33 2    • RDW 11/04/2022 12 6    • MPV 11/04/2022 8 8 (L)    • Platelets 64/42/5552 309    • nRBC 11/04/2022 0    • Neutrophils Relative 11/04/2022 75    • Immat GRANS % 11/04/2022 0    • Lymphocytes Relative 11/04/2022 18    • Monocytes Relative 11/04/2022 6    • Eosinophils Relative 11/04/2022 1    • Basophils Relative 11/04/2022 0    • Neutrophils Absolute 11/04/2022 9 87 (H)    • Immature Grans Absolute 11/04/2022 0 04    • Lymphocytes Absolute 11/04/2022 2 43    • Monocytes Absolute 11/04/2022 0 84    • Eosinophils Absolute 11/04/2022 0 10    • Basophils Absolute 11/04/2022 0 04    • Sodium 11/04/2022 137    • Potassium 11/04/2022 3 8    • Chloride 11/04/2022 100    • CO2 11/04/2022 26    • ANION GAP 11/04/2022 11    • BUN 11/04/2022 16    • Creatinine 11/04/2022 0 94    • Glucose 11/04/2022 125    • Calcium 11/04/2022 9 6    • AST 11/04/2022 19    • ALT 11/04/2022 20    • Alkaline Phosphatase 11/04/2022 104    • Total Protein 11/04/2022 7 9    • Albumin 11/04/2022 3 7    • Total Bilirubin 11/04/2022 0 47    • eGFR 11/04/2022 62    • hs TnI 0hr 11/04/2022 4    • Ventricular Rate 11/04/2022 69    • Atrial Rate 11/04/2022 250    • QRSD Interval 11/04/2022 92    • QT Interval 11/04/2022 440    • QTC Interval 11/04/2022 471    • P Axis 11/04/2022 58    • QRS Axis 11/04/2022 43    • T Wave Axis 11/04/2022 76    • hs TnI 2hr 11/04/2022 5    • Delta 2hr hsTnI 11/04/2022 1    • hs TnI 4hr 11/04/2022 6    • Delta 4hr hsTnI 11/04/2022 2    • Ventricular Rate 11/04/2022 73    • Atrial Rate 11/04/2022 73    • NH Interval 11/04/2022 194    • QRSD Interval 11/04/2022 90    • QT Interval 11/04/2022 428    • QTC Interval 11/04/2022 471    • P Axis 11/04/2022 66    • QRS Axis 11/04/2022 54    • T Wave Axis 11/04/2022 83    • Sed Rate 11/04/2022 40 (H)    • LA size 11/06/2022 3 9    • Triscuspid Valve Regurgi* 11/06/2022 29 0    • Tricuspid valve peak reg* 11/06/2022 2 71    • LVPWd 11/06/2022 1 30    • Left Atrium Area-systoli* 11/06/2022 19 1    • Left Atrium Area-systoli* 11/06/2022 16 5    • MV E' Tissue Velocity Se* 11/06/2022 10    • RA 2D Volume 11/06/2022 22 0    • TR Peak Axel 11/06/2022 2 7    • IVSd 11/06/2022 3 15    • LV DIASTOLIC VOLUME (MOD* 71/90/9066 108    • LEFT VENTRICLE SYSTOLIC * 04/10/5420 49    • Left ventricular stroke * 11/06/2022 59 00    • A4C EF 11/06/2022 63    • LA length (A2C) 11/06/2022 5 00    • LVIDd 11/06/2022 4 80    • IVS 11/06/2022 1 3    • LVIDS 11/06/2022 3 50    • FS 11/06/2022 27    • Asc Ao 11/06/2022 3 2    • Ao root 11/06/2022 3 40    • RVID d 11/06/2022 3 2    • MV valve area p 1/2 meth* 11/06/2022 3 06    • E wave deceleration time 11/06/2022 248    • MV Peak E Axel 11/06/2022 85    • MV Peak A Xael 11/06/2022 0 91    • RAA A4C 11/06/2022 10 9    • MV stenosis pressure 1/2* 11/06/2022 72    • LVSV, 2D 11/06/2022 59    • LV EF 11/06/2022 60    • WBC 11/06/2022 10 76 (H)    • RBC 11/06/2022 4 32    • Hemoglobin 11/06/2022 13 6    • Hematocrit 11/06/2022 41 7    • MCV 11/06/2022 97    • MCH 11/06/2022 31 5    • MCHC 11/06/2022 32 6    • RDW 11/06/2022 12 8    • MPV 11/06/2022 9 2    • Platelets 01/28/2385 300    • nRBC 11/06/2022 0    • Neutrophils Relative 11/06/2022 61    • Immat GRANS % 11/06/2022 0    • Lymphocytes Relative 11/06/2022 27    • Monocytes Relative 11/06/2022 9    • Eosinophils Relative 11/06/2022 2    • Basophils Relative 11/06/2022 1    • Neutrophils Absolute 11/06/2022 6 67    • Immature Grans Absolute 11/06/2022 0 04    • Lymphocytes Absolute 11/06/2022 2 90    • Monocytes Absolute 11/06/2022 0 93    • Eosinophils Absolute 11/06/2022 0 17    • Basophils Absolute 11/06/2022 0 05    • Baseline HR 11/07/2022 74    • Baseline BP 11/07/2022 163/74    • O2 sat rest 11/07/2022 96    • Stress peak HR 11/07/2022 92    • Post peak BP 11/07/2022 157    • Rate Pressure Product 11/07/2022 14,444 0    • O2 sat peak 11/07/2022 96    • Recovery HR 11/07/2022 74    • Recovery BP 11/07/2022 144/72    • Max HR 11/07/2022 92    • Rest Nuclear Isotope Dose 11/07/2022 11 00    • Stress Nuclear Isotope D* 11/07/2022 33 00    • ST Depression (mm) 11/07/2022 0    • WBC 11/07/2022 12 07 (H)    • RBC 11/07/2022 4 17    • Hemoglobin 11/07/2022 13 1    • Hematocrit 11/07/2022 40 2    • MCV 11/07/2022 96    • MCH 11/07/2022 31 4    • MCHC 11/07/2022 32 6    • RDW 11/07/2022 12 7    • MPV 11/07/2022 9 0    • Platelets 41/93/3388 267    • nRBC 11/07/2022 0    • Neutrophils Relative 11/07/2022 63    • Immat GRANS % 11/07/2022 0    • Lymphocytes Relative 11/07/2022 26    • Monocytes Relative 11/07/2022 9    • Eosinophils Relative 11/07/2022 2    • Basophils Relative 11/07/2022 0    • Neutrophils Absolute 11/07/2022 7 54    • Immature Grans Absolute 11/07/2022 0 05    • Lymphocytes Absolute 11/07/2022 3 18    • Monocytes Absolute 11/07/2022 1 06    • Eosinophils Absolute 11/07/2022 0 19    • Basophils Absolute 11/07/2022 0 05    • Sodium 11/07/2022 140    • Potassium 11/07/2022 3 9    • Chloride 11/07/2022 104    • CO2 11/07/2022 26    • ANION GAP 11/07/2022 10    • BUN 11/07/2022 15    • Creatinine 11/07/2022 0 83    • Glucose 11/07/2022 112    • Glucose, Fasting 11/07/2022 112 (H)    • Calcium 11/07/2022 9 6    • eGFR 11/07/2022 72    • Protocol Name 11/07/2022 LEXISCAN-SIT    • Time In Exercise Phase 11/07/2022 00:03:00    • MAX  SYSTOLIC BP 55/57/2808 030    • Max Diastolic Bp 12/15/5825 74    • Max Heart Rate 11/07/2022 92    • Max Predicted Heart Rate 11/07/2022 151    • Reason for Termination 11/07/2022 Protocol Complete    • Test Indication 11/07/2022 CHEST PAIN, SOB    • Target Hr Formular 11/07/2022 (220 - Age)*85%    • Arrhy During Ex 11/07/2022 ventricular premature beats-isolated    • Chest Pain Statement 11/07/2022 none          Radiology Results:   XR chest 2 views    Result Date: 11/5/2022  Narrative: CHEST INDICATION:   Chest Pain  COMPARISON:  03/15/2022 EXAM PERFORMED/VIEWS:  XR CHEST PA & LATERAL  The frontal view was performed utilizing dual energy radiographic technique  Images: 4 FINDINGS: Cardiomediastinal silhouette appears unremarkable  A median sternotomy has been performed  The lungs are clear  No pneumothorax or pleural effusion  Osseous structures appear within normal limits for patient age  A cholecystectomy has been performed  Impression: No acute cardiopulmonary disease   Workstation performed: [id number]     Stress strip    Result Date: 11/7/2022  Narrative: Confirmed by Aneta Delcid ([id number]),  Dandre Course ([phone] on 18/1/2052 10:40:20 AM    Echo complete w/ contrast if indicated    Result Date: 11/6/2022  Narrative: •  Left Ventricle: Left ventricular cavity size is normal  Wall thickness is mildly increased  There is mild concentric hypertrophy  The left ventricular ejection fraction is 60%  Systolic function is normal  Wall motion is normal  Diastolic function is normal  •  Mitral Valve: There is mild annular calcification  •  Tricuspid Valve: There is mild regurgitation  NM myocardial perfusion spect (rx stress and/or rest)    Result Date: 11/7/2022  Narrative: •  Stress ECG: No ST deviation is noted  The ECG was not diagnostic due to pharmacological (vasodilator) stress  •  Stress Function: Left ventricular function post-stress is normal  •  Perfusion: Perfusion defects in the inferior and distal anterior wall seen in the rest and supine stress images were not seen in the prone stress images indicating that they were likely diaphragmatic and breast attenuation artifacts respectively  •  Stress Combined Conclusion: The ECG and SPECT imaging portions of the stress study are concordant with no evidence of stress induced myocardial ischemia

## 2022-11-18 ENCOUNTER — TELEPHONE (OUTPATIENT)
Dept: GASTROENTEROLOGY | Facility: CLINIC | Age: 67
End: 2022-11-18

## 2023-01-10 ENCOUNTER — OFFICE VISIT (OUTPATIENT)
Dept: GASTROENTEROLOGY | Facility: CLINIC | Age: 68
End: 2023-01-10

## 2023-01-10 VITALS
BODY MASS INDEX: 42.46 KG/M2 | WEIGHT: 210.6 LBS | HEIGHT: 59 IN | DIASTOLIC BLOOD PRESSURE: 88 MMHG | OXYGEN SATURATION: 97 % | HEART RATE: 73 BPM | SYSTOLIC BLOOD PRESSURE: 148 MMHG

## 2023-01-10 DIAGNOSIS — K58.8 OTHER IRRITABLE BOWEL SYNDROME: Primary | ICD-10-CM

## 2023-01-10 DIAGNOSIS — R14.1 ABDOMINAL GAS PAIN: ICD-10-CM

## 2023-01-10 DIAGNOSIS — K21.9 GASTROESOPHAGEAL REFLUX DISEASE WITHOUT ESOPHAGITIS: ICD-10-CM

## 2023-01-10 NOTE — LETTER
January 10, 2023     [facility] [id number] [address]    Patient: David Mg   YOB: 1955   Date of Visit: 1/10/2023       Dear Dr Sylvie Montero:    Thank you for referring David Mg to me for evaluation  Below are my notes for this consultation  If you have questions, please do not hesitate to call me  I look forward to following your patient along with you  Sincerely,        Kaaren Kanner, PA-C        CC: No Recipients  Kaaren Kanner, [place]  1/10/2023 11:41 AM  Sign when Signing Visit  [facility] - Outpatient Follow-up Note  David Mg 69 y o  female MRN: [id number]  Encounter: [id number]          ASSESSMENT AND PLAN:      1  Other irritable bowel syndrome  2  Gastroesophageal reflux disease without esophagitis  3  Abdominal gas pain  -Dietary modifications reviewed today  Gas handout given  Patient will start to utilize Gas-X therapy   -Will continue famotidine 40 mg daily   -Will continue high-fiber diet and fiber supplementation   -Patient will only utilize Linzess 72 mcg if she becomes constipated     -Follow-up in 3 months   ______________________________________________________________________    SUBJECTIVE:    73-year-old female with a past medical history significant for irritable bowel syndrome, gastroesophageal reflux disease, hypertension, and osteoporosis presents to the GI clinic today for follow-up  Will overall patient is doing better than she was at the previous appointment  She reports that she is currently taking Benefiber and famotidine daily  She reports that she is not utilizing the Linzess 72 mcg any longer due to the fact that she was been having diarrhea for the past week  Patient tends to alternate between diarrhea and constipation  Patient is reporting abdominal gas and bloating  Patient does have a carb heavy diet  Patient has no alarm symptoms    Patient does report intentional weight loss     Patient's EGD and colonoscopy from 2019 were essentially normal examinations  REVIEW OF SYSTEMS IS OTHERWISE NEGATIVE  Historical Information   Past Medical History:   Diagnosis Date   • Angina at rest [facility])    • Arthritis      • Back problem    • Diabetes ([facility] [address] )    • Gall stones    • Heart attack ([facility] [address] )    • High blood pressure    • Stomach problems      Past Surgical History:   Procedure Laterality Date   • CORONARY STENT PLACEMENT      x3   • GALLBLADDER SURGERY     • HYSTERECTOMY     • KNEE SURGERY Right    • OTHER SURGICAL HISTORY      Double Bypass      Social History   Social History     Substance and Sexual Activity   Alcohol Use Not Currently     Social History     Substance and Sexual Activity   Drug Use No     Social History     Tobacco Use   Smoking Status Former   Smokeless Tobacco Never     Family History   Problem Relation Age of Onset   • Breast cancer Mother 46   • Colon cancer Father    • Breast cancer Maternal Aunt    • Ovarian cancer Maternal Aunt    • Breast cancer Maternal Aunt    • Cervical cancer Maternal Aunt    • Uterine cancer Maternal Aunt    • No Known Problems Sister    • No Known Problems Daughter    • No Known Problems Sister    • No Known Problems Sister        Meds/Allergies        Current Outpatient Medications:   •  acetaminophen (TYLENOL) 325 mg tablet  •  albuterol (2 5 mg/3 mL) 0 083 % nebulizer solution  •  amLODIPine (NORVASC) 5 mg tablet  •  aspirin 81 MG tablet  •  atorvastatin (LIPITOR) 80 mg tablet  •  calcium-vitamin D 250-100 MG-UNIT per tablet  •  clopidogrel (PLAVIX) 75 mg tablet  •  clotrimazole-betamethasone (LOTRISONE) 1-0 05 % cream  •  cyanocobalamin (VITAMIN B-12) 100 mcg tablet  •  famotidine (PEPCID) 40 MG tablet  •  guaiFENesin (ROBITUSSIN) 100 MG/5ML oral liquid  •  isosorbide mononitrate (IMDUR) 30 mg 24 hr tablet  •  losartan (COZAAR) 50 mg tablet  •  metoprolol succinate (TOPROL-XL) 50 mg 24 hr tablet  •  nitroglycerin (NITROSTAT) 0 4 mg SL tablet  •  nystatin (MYCOSTATIN) cream  •  Omega-3 1000 MG CAPS  •  terconazole (TERAZOL 7) 0 4 % vaginal cream  •  traMADol (ULTRAM) 50 mg tablet  •  vilazodone (VIIBRYD) 10 mg tablet  •  zolpidem (AMBIEN) 10 mg tablet  •  ALPRAZolam (XANAX) 0 25 mg tablet  •  conjugated estrogens (PREMARIN) vaginal cream  •  metoprolol succinate (TOPROL-XL) 25 mg 24 hr tablet    Allergies   Allergen Reactions   • Wound Dressing Adhesive      Tape    • Wound Dressings Rash     Swelling, red           Objective      Blood pressure 148/88, pulse 73, height 4' 11" (1 499 m), weight 95 5 kg (210 lb 9 6 oz), SpO2 97 %, not currently breastfeeding  Body mass index is 42 54 kg/m²  PHYSICAL EXAM:      General Appearance:   Alert, cooperative, no distress   HEENT:   Normocephalic, atraumatic, anicteric      Neck:  Supple, symmetrical, trachea midline   Lungs:   Clear to auscultation bilaterally; no rales, rhonchi or wheezing; respirations unlabored    Heart[de-identified]   Regular rate and rhythm; no murmur, rub, or gallop  Abdomen:   Soft, non-tender, non-distended; normal bowel sounds; no masses, no organomegaly    Genitalia:   Deferred    Rectal:   Deferred    Extremities:  No cyanosis, clubbing or edema    Pulses:  2+ and symmetric    Skin:  No jaundice, rashes, or lesions    Lymph nodes:  No palpable cervical lymphadenopathy        Lab Results:   No visits with results within 1 Day(s) from this visit     Latest known visit with results is:   Admission on 11/04/2022, Discharged on 11/07/2022   Component Date Value   • WBC 11/04/2022 13 32 (H)    • RBC 11/04/2022 4 29    • Hemoglobin 11/04/2022 13 5    • Hematocrit 11/04/2022 40 7    • MCV 11/04/2022 95    • MCH 11/04/2022 31 5    • MCHC 11/04/2022 33 2    • RDW 11/04/2022 12 6    • MPV 11/04/2022 8 8 (L)    • Platelets 93/84/8721 309    • nRBC 11/04/2022 0    • Neutrophils Relative 11/04/2022 75    • Immat GRANS % 11/04/2022 0    • Lymphocytes Relative 11/04/2022 18    • Monocytes Relative 11/04/2022 6    • Eosinophils Relative 11/04/2022 1    • Basophils Relative 11/04/2022 0    • Neutrophils Absolute 11/04/2022 9 87 (H)    • Immature Grans Absolute 11/04/2022 0 04    • Lymphocytes Absolute 11/04/2022 2 43    • Monocytes Absolute 11/04/2022 0 84    • Eosinophils Absolute 11/04/2022 0 10    • Basophils Absolute 11/04/2022 0 04    • Sodium 11/04/2022 137    • Potassium 11/04/2022 3 8    • Chloride 11/04/2022 100    • CO2 11/04/2022 26    • ANION GAP 11/04/2022 11    • BUN 11/04/2022 16    • Creatinine 11/04/2022 0 94    • Glucose 11/04/2022 125    • Calcium 11/04/2022 9 6    • AST 11/04/2022 19    • ALT 11/04/2022 20    • Alkaline Phosphatase 11/04/2022 104    • Total Protein 11/04/2022 7 9    • Albumin 11/04/2022 3 7    • Total Bilirubin 11/04/2022 0 47    • eGFR 11/04/2022 62    • hs TnI 0hr 11/04/2022 4    • Ventricular Rate 11/04/2022 69    • Atrial Rate 11/04/2022 250    • QRSD Interval 11/04/2022 92    • QT Interval 11/04/2022 440    • QTC Interval 11/04/2022 471    • P Axis 11/04/2022 58    • QRS Axis 11/04/2022 43    • T Wave Axis 11/04/2022 76    • hs TnI 2hr 11/04/2022 5    • Delta 2hr hsTnI 11/04/2022 1    • hs TnI 4hr 11/04/2022 6    • Delta 4hr hsTnI 11/04/2022 2    • Ventricular Rate 11/04/2022 73    • Atrial Rate 11/04/2022 73    • TN Interval 11/04/2022 194    • QRSD Interval 11/04/2022 90    • QT Interval 11/04/2022 428    • QTC Interval 11/04/2022 471    • P Axis 11/04/2022 66    • QRS Axis 11/04/2022 54    • T Wave Axis 11/04/2022 83    • Sed Rate 11/04/2022 40 (H)    • LA size 11/06/2022 3 9    • Triscuspid Valve Regurgi* 11/06/2022 29 0    • Tricuspid valve peak reg* 11/06/2022 2 71    • LVPWd 11/06/2022 1 30    • Left Atrium Area-systoli* 11/06/2022 19 1    • Left Atrium Area-systoli* 11/06/2022 16 5    • MV E' Tissue Velocity Se* 11/06/2022 10    • RA 2D Volume 11/06/2022 22 0    • TR Peak Axel 11/06/2022 2 7    • IVSd 11/06/2022 3 86    • LV DIASTOLIC VOLUME (MOD* 76/44/8713 108 • LEFT VENTRICLE SYSTOLIC * 30/77/7790 49    • Left ventricular stroke * 11/06/2022 59 00    • A4C EF 11/06/2022 63    • LA length (A2C) 11/06/2022 5 00    • LVIDd 11/06/2022 4 80    • IVS 11/06/2022 1 3    • LVIDS 11/06/2022 3 50    • FS 11/06/2022 27    • Asc Ao 11/06/2022 3 2    • Ao root 11/06/2022 3 40    • RVID d 11/06/2022 3 2    • MV valve area p 1/2 meth* 11/06/2022 3 06    • E wave deceleration time 11/06/2022 248    • MV Peak E Axel 11/06/2022 85    • MV Peak A Axel 11/06/2022 0 91    • RAA A4C 11/06/2022 10 9    • MV stenosis pressure 1/2* 11/06/2022 72    • LVSV, 2D 11/06/2022 59    • LV EF 11/06/2022 60    • WBC 11/06/2022 10 76 (H)    • RBC 11/06/2022 4 32    • Hemoglobin 11/06/2022 13 6    • Hematocrit 11/06/2022 41 7    • MCV 11/06/2022 97    • MCH 11/06/2022 31 5    • MCHC 11/06/2022 32 6    • RDW 11/06/2022 12 8    • MPV 11/06/2022 9 2    • Platelets 31/67/5301 300    • nRBC 11/06/2022 0    • Neutrophils Relative 11/06/2022 61    • Immat GRANS % 11/06/2022 0    • Lymphocytes Relative 11/06/2022 27    • Monocytes Relative 11/06/2022 9    • Eosinophils Relative 11/06/2022 2    • Basophils Relative 11/06/2022 1    • Neutrophils Absolute 11/06/2022 6 67    • Immature Grans Absolute 11/06/2022 0 04    • Lymphocytes Absolute 11/06/2022 2 90    • Monocytes Absolute 11/06/2022 0 93    • Eosinophils Absolute 11/06/2022 0 17    • Basophils Absolute 11/06/2022 0 05    • Baseline HR 11/07/2022 74    • Baseline BP 11/07/2022 163/74    • O2 sat rest 11/07/2022 96    • Stress peak HR 11/07/2022 92    • Post peak BP 11/07/2022 157    • Rate Pressure Product 11/07/2022 14,444 0    • O2 sat peak 11/07/2022 96    • Recovery HR 11/07/2022 74    • Recovery BP 11/07/2022 144/72    • Max HR 11/07/2022 92    • Rest Nuclear Isotope Dose 11/07/2022 11 00    • Stress Nuclear Isotope D* 11/07/2022 33 00    • ST Depression (mm) 11/07/2022 0    • WBC 11/07/2022 12 07 (H)    • RBC 11/07/2022 4 17    • Hemoglobin 11/07/2022 13 1 • Hematocrit 11/07/2022 40 2    • MCV 11/07/2022 96    • MCH 11/07/2022 31 4    • MCHC 11/07/2022 32 6    • RDW 11/07/2022 12 7    • MPV 11/07/2022 9 0    • Platelets 23/24/3596 267    • nRBC 11/07/2022 0    • Neutrophils Relative 11/07/2022 63    • Immat GRANS % 11/07/2022 0    • Lymphocytes Relative 11/07/2022 26    • Monocytes Relative 11/07/2022 9    • Eosinophils Relative 11/07/2022 2    • Basophils Relative 11/07/2022 0    • Neutrophils Absolute 11/07/2022 7 54    • Immature Grans Absolute 11/07/2022 0 05    • Lymphocytes Absolute 11/07/2022 3 18    • Monocytes Absolute 11/07/2022 1 06    • Eosinophils Absolute 11/07/2022 0 19    • Basophils Absolute 11/07/2022 0 05    • Sodium 11/07/2022 140    • Potassium 11/07/2022 3 9    • Chloride 11/07/2022 104    • CO2 11/07/2022 26    • ANION GAP 11/07/2022 10    • BUN 11/07/2022 15    • Creatinine 11/07/2022 0 83    • Glucose 11/07/2022 112    • Glucose, Fasting 11/07/2022 112 (H)    • Calcium 11/07/2022 9 6    • eGFR 11/07/2022 72    • Protocol Name 11/07/2022 LEXISCAN-SIT    • Time In Exercise Phase 11/07/2022 00:03:00    • MAX  SYSTOLIC BP 12/81/5145 163    • Max Diastolic Bp 09/67/5158 74    • Max Heart Rate 11/07/2022 92    • Max Predicted Heart Rate 11/07/2022 151    • Reason for Termination 11/07/2022 Protocol Complete    • Test Indication 11/07/2022 CHEST PAIN, SOB    • Target Hr Formular 11/07/2022 (220 - Age)*85%    • Arrhy During Ex 11/07/2022 ventricular premature beats-isolated    • Chest Pain Statement 11/07/2022 none          Radiology Results:   No results found

## 2023-01-10 NOTE — PROGRESS NOTES
[facility] - Outpatient Follow-up Note  Missy Dickens 69 y o  female MRN: [id number]  Encounter: [id number]          ASSESSMENT AND PLAN:      1  Other irritable bowel syndrome  2  Gastroesophageal reflux disease without esophagitis  3  Abdominal gas pain  -Dietary modifications reviewed today  Gas handout given  Patient will start to utilize Gas-X therapy   -Will continue famotidine 40 mg daily   -Will continue high-fiber diet and fiber supplementation   -Patient will only utilize Linzess 72 mcg if she becomes constipated     -Follow-up in 3 months   ______________________________________________________________________    SUBJECTIVE:    73-year-old female with a past medical history significant for irritable bowel syndrome, gastroesophageal reflux disease, hypertension, and osteoporosis presents to the GI clinic today for follow-up  Will overall patient is doing better than she was at the previous appointment  She reports that she is currently taking Benefiber and famotidine daily  She reports that she is not utilizing the Linzess 72 mcg any longer due to the fact that she was been having diarrhea for the past week  Patient tends to alternate between diarrhea and constipation  Patient is reporting abdominal gas and bloating  Patient does have a carb heavy diet  Patient has no alarm symptoms  Patient does report intentional weight loss  Patient's EGD and colonoscopy from 2019 were essentially normal examinations  REVIEW OF SYSTEMS IS OTHERWISE NEGATIVE  Historical Information   Past Medical History:   Diagnosis Date   • Angina at rest [facility])    • Arthritis      • Back problem    • Diabetes ([facility] )    • Gall stones    • Heart attack ([facility] )    • High blood pressure    • Stomach problems      Past Surgical History:   Procedure Laterality Date   • CORONARY STENT PLACEMENT      x3   • GALLBLADDER SURGERY     • HYSTERECTOMY     • KNEE SURGERY Right    • OTHER SURGICAL HISTORY      Double Bypass      Social History   Social History     Substance and Sexual Activity   Alcohol Use Not Currently     Social History     Substance and Sexual Activity   Drug Use No     Social History     Tobacco Use   Smoking Status Former   Smokeless Tobacco Never     Family History   Problem Relation Age of Onset   • Breast cancer Mother 46   • Colon cancer Father    • Breast cancer Maternal Aunt    • Ovarian cancer Maternal Aunt    • Breast cancer Maternal Aunt    • Cervical cancer Maternal Aunt    • Uterine cancer Maternal Aunt    • No Known Problems Sister    • No Known Problems Daughter    • No Known Problems Sister    • No Known Problems Sister        Meds/Allergies       Current Outpatient Medications:   •  acetaminophen (TYLENOL) 325 mg tablet  •  albuterol (2 5 mg/3 mL) 0 083 % nebulizer solution  •  amLODIPine (NORVASC) 5 mg tablet  •  aspirin 81 MG tablet  •  atorvastatin (LIPITOR) 80 mg tablet  •  calcium-vitamin D 250-100 MG-UNIT per tablet  •  clopidogrel (PLAVIX) 75 mg tablet  •  clotrimazole-betamethasone (LOTRISONE) 1-0 05 % cream  •  cyanocobalamin (VITAMIN B-12) 100 mcg tablet  •  famotidine (PEPCID) 40 MG tablet  •  guaiFENesin (ROBITUSSIN) 100 MG/5ML oral liquid  •  isosorbide mononitrate (IMDUR) 30 mg 24 hr tablet  •  losartan (COZAAR) 50 mg tablet  •  metoprolol succinate (TOPROL-XL) 50 mg 24 hr tablet  •  nitroglycerin (NITROSTAT) 0 4 mg SL tablet  •  nystatin (MYCOSTATIN) cream  •  Omega-3 1000 MG CAPS  •  terconazole (TERAZOL 7) 0 4 % vaginal cream  •  traMADol (ULTRAM) 50 mg tablet  •  vilazodone (VIIBRYD) 10 mg tablet  •  zolpidem (AMBIEN) 10 mg tablet  •  ALPRAZolam (XANAX) 0 25 mg tablet  •  conjugated estrogens (PREMARIN) vaginal cream  •  metoprolol succinate (TOPROL-XL) 25 mg 24 hr tablet    Allergies   Allergen Reactions   • Wound Dressing Adhesive      Tape    • Wound Dressings Rash     Swelling, red           Objective     Blood pressure 148/88, pulse 73, height 4' 11" (1 499 m), weight 95 5 kg (210 lb 9 6 oz), SpO2 97 %, not currently breastfeeding  Body mass index is 42 54 kg/m²  PHYSICAL EXAM:      General Appearance:   Alert, cooperative, no distress   HEENT:   Normocephalic, atraumatic, anicteric      Neck:  Supple, symmetrical, trachea midline   Lungs:   Clear to auscultation bilaterally; no rales, rhonchi or wheezing; respirations unlabored    Heart[de-identified]   Regular rate and rhythm; no murmur, rub, or gallop  Abdomen:   Soft, non-tender, non-distended; normal bowel sounds; no masses, no organomegaly    Genitalia:   Deferred    Rectal:   Deferred    Extremities:  No cyanosis, clubbing or edema    Pulses:  2+ and symmetric    Skin:  No jaundice, rashes, or lesions    Lymph nodes:  No palpable cervical lymphadenopathy        Lab Results:   No visits with results within 1 Day(s) from this visit     Latest known visit with results is:   Admission on 11/04/2022, Discharged on 11/07/2022   Component Date Value   • WBC 11/04/2022 13 32 (H)    • RBC 11/04/2022 4 29    • Hemoglobin 11/04/2022 13 5    • Hematocrit 11/04/2022 40 7    • MCV 11/04/2022 95    • MCH 11/04/2022 31 5    • MCHC 11/04/2022 33 2    • RDW 11/04/2022 12 6    • MPV 11/04/2022 8 8 (L)    • Platelets 61/48/6383 309    • nRBC 11/04/2022 0    • Neutrophils Relative 11/04/2022 75    • Immat GRANS % 11/04/2022 0    • Lymphocytes Relative 11/04/2022 18    • Monocytes Relative 11/04/2022 6    • Eosinophils Relative 11/04/2022 1    • Basophils Relative 11/04/2022 0    • Neutrophils Absolute 11/04/2022 9 87 (H)    • Immature Grans Absolute 11/04/2022 0 04    • Lymphocytes Absolute 11/04/2022 2 43    • Monocytes Absolute 11/04/2022 0 84    • Eosinophils Absolute 11/04/2022 0 10    • Basophils Absolute 11/04/2022 0 04    • Sodium 11/04/2022 137    • Potassium 11/04/2022 3 8    • Chloride 11/04/2022 100    • CO2 11/04/2022 26    • ANION GAP 11/04/2022 11    • BUN 11/04/2022 16    • Creatinine 11/04/2022 0 94    • Glucose 11/04/2022 125    • Calcium 11/04/2022 9 6    • AST 11/04/2022 19    • ALT 11/04/2022 20    • Alkaline Phosphatase 11/04/2022 104    • Total Protein 11/04/2022 7 9    • Albumin 11/04/2022 3 7    • Total Bilirubin 11/04/2022 0 47    • eGFR 11/04/2022 62    • hs TnI 0hr 11/04/2022 4    • Ventricular Rate 11/04/2022 69    • Atrial Rate 11/04/2022 250    • QRSD Interval 11/04/2022 92    • QT Interval 11/04/2022 440    • QTC Interval 11/04/2022 471    • P Axis 11/04/2022 58    • QRS Axis 11/04/2022 43    • T Wave Axis 11/04/2022 76    • hs TnI 2hr 11/04/2022 5    • Delta 2hr hsTnI 11/04/2022 1    • hs TnI 4hr 11/04/2022 6    • Delta 4hr hsTnI 11/04/2022 2    • Ventricular Rate 11/04/2022 73    • Atrial Rate 11/04/2022 73    • ID Interval 11/04/2022 194    • QRSD Interval 11/04/2022 90    • QT Interval 11/04/2022 428    • QTC Interval 11/04/2022 471    • P Axis 11/04/2022 66    • QRS Axis 11/04/2022 54    • T Wave Axis 11/04/2022 83    • Sed Rate 11/04/2022 40 (H)    • LA size 11/06/2022 3 9    • Triscuspid Valve Regurgi* 11/06/2022 29 0    • Tricuspid valve peak reg* 11/06/2022 2 71    • LVPWd 11/06/2022 1 30    • Left Atrium Area-systoli* 11/06/2022 19 1    • Left Atrium Area-systoli* 11/06/2022 16 5    • MV E' Tissue Velocity Se* 11/06/2022 10    • RA 2D Volume 11/06/2022 22 0    • TR Peak Axel 11/06/2022 2 7    • IVSd 11/06/2022 1 56    • LV DIASTOLIC VOLUME (MOD* 22/91/4495 108    • LEFT VENTRICLE SYSTOLIC * 82/97/1391 49    • Left ventricular stroke * 11/06/2022 59 00    • A4C EF 11/06/2022 63    • LA length (A2C) 11/06/2022 5 00    • LVIDd 11/06/2022 4 80    • IVS 11/06/2022 1 3    • LVIDS 11/06/2022 3 50    • FS 11/06/2022 27    • Asc Ao 11/06/2022 3 2    • Ao root 11/06/2022 3 40    • RVID d 11/06/2022 3 2    • MV valve area p 1/2 meth* 11/06/2022 3 06    • E wave deceleration time 11/06/2022 248    • MV Peak E Axel 11/06/2022 85    • MV Peak A Axel 11/06/2022 0 91    • RAA A4C 11/06/2022 10 9    • MV stenosis pressure 1/2* 11/06/2022 72    • LVSV, 2D 11/06/2022 59    • LV EF 11/06/2022 60    • WBC 11/06/2022 10 76 (H)    • RBC 11/06/2022 4 32    • Hemoglobin 11/06/2022 13 6    • Hematocrit 11/06/2022 41 7    • MCV 11/06/2022 97    • MCH 11/06/2022 31 5    • MCHC 11/06/2022 32 6    • RDW 11/06/2022 12 8    • MPV 11/06/2022 9 2    • Platelets 70/66/4626 300    • nRBC 11/06/2022 0    • Neutrophils Relative 11/06/2022 61    • Immat GRANS % 11/06/2022 0    • Lymphocytes Relative 11/06/2022 27    • Monocytes Relative 11/06/2022 9    • Eosinophils Relative 11/06/2022 2    • Basophils Relative 11/06/2022 1    • Neutrophils Absolute 11/06/2022 6 67    • Immature Grans Absolute 11/06/2022 0 04    • Lymphocytes Absolute 11/06/2022 2 90    • Monocytes Absolute 11/06/2022 0 93    • Eosinophils Absolute 11/06/2022 0 17    • Basophils Absolute 11/06/2022 0 05    • Baseline HR 11/07/2022 74    • Baseline BP 11/07/2022 163/74    • O2 sat rest 11/07/2022 96    • Stress peak HR 11/07/2022 92    • Post peak BP 11/07/2022 157    • Rate Pressure Product 11/07/2022 14,444 0    • O2 sat peak 11/07/2022 96    • Recovery HR 11/07/2022 74    • Recovery BP 11/07/2022 144/72    • Max HR 11/07/2022 92    • Rest Nuclear Isotope Dose 11/07/2022 11 00    • Stress Nuclear Isotope D* 11/07/2022 33 00    • ST Depression (mm) 11/07/2022 0    • WBC 11/07/2022 12 07 (H)    • RBC 11/07/2022 4 17    • Hemoglobin 11/07/2022 13 1    • Hematocrit 11/07/2022 40 2    • MCV 11/07/2022 96    • MCH 11/07/2022 31 4    • MCHC 11/07/2022 32 6    • RDW 11/07/2022 12 7    • MPV 11/07/2022 9 0    • Platelets 91/95/1224 267    • nRBC 11/07/2022 0    • Neutrophils Relative 11/07/2022 63    • Immat GRANS % 11/07/2022 0    • Lymphocytes Relative 11/07/2022 26    • Monocytes Relative 11/07/2022 9    • Eosinophils Relative 11/07/2022 2    • Basophils Relative 11/07/2022 0    • Neutrophils Absolute 11/07/2022 7 54    • Immature Grans Absolute 11/07/2022 0 05    • Lymphocytes Absolute 11/07/2022 3 18    • Monocytes Absolute 11/07/2022 1 06    • Eosinophils Absolute 11/07/2022 0 19    • Basophils Absolute 11/07/2022 0 05    • Sodium 11/07/2022 140    • Potassium 11/07/2022 3 9    • Chloride 11/07/2022 104    • CO2 11/07/2022 26    • ANION GAP 11/07/2022 10    • BUN 11/07/2022 15    • Creatinine 11/07/2022 0 83    • Glucose 11/07/2022 112    • Glucose, Fasting 11/07/2022 112 (H)    • Calcium 11/07/2022 9 6    • eGFR 11/07/2022 72    • Protocol Name 11/07/2022 LEXISCAN-SIT    • Time In Exercise Phase 11/07/2022 00:03:00    • MAX  SYSTOLIC BP 87/99/0622 863    • Max Diastolic Bp 96/44/2165 74    • Max Heart Rate 11/07/2022 92    • Max Predicted Heart Rate 11/07/2022 151    • Reason for Termination 11/07/2022 Protocol Complete    • Test Indication 11/07/2022 CHEST PAIN, SOB    • Target Hr Formular 11/07/2022 (220 - Age)*85%    • Arrhy During Ex 11/07/2022 ventricular premature beats-isolated    • Chest Pain Statement 11/07/2022 none          Radiology Results:   No results found

## 2023-05-09 ENCOUNTER — TELEPHONE (OUTPATIENT)
Dept: GASTROENTEROLOGY | Facility: CLINIC | Age: 68
End: 2023-05-09

## 2023-05-09 NOTE — TELEPHONE ENCOUNTER
Patients GI provider:  Gamaliel CONKLIN    Number to return call: [phone]    Reason for call: Pt calling because she would like to speak with Rosario Smyth about the Upper GI she is scheduled for on 5/17/23    Scheduled procedure/appointment date if applicable: Procedure 2/0/79

## 2023-05-09 NOTE — TELEPHONE ENCOUNTER
Pt stated she saw Linh Forte and wanted to speak to her  Advised pt Linh Forte is rounding at the hospital   Pt said she cannot drink the barium and she has an issue with that and she won't be able to do this  Pt wants to know if there is something else she can do in place of the barium and is this Upper GI actually necessary?      Routing to Darby Rausch

## 2023-05-10 NOTE — TELEPHONE ENCOUNTER
Called and LMOM with advised from Anibal Forth that if she is feeling better we can hold off on the testing  Any questions in regard to this to give office a call

## 2023-05-10 NOTE — TELEPHONE ENCOUNTER
Patient called and stated she never received a call back from the office yet   She is wondering if any other testing can be done instead of the upper GI?

## 2023-06-09 ENCOUNTER — HOSPITAL ENCOUNTER (OUTPATIENT)
Dept: GASTROENTEROLOGY | Facility: HOSPITAL | Age: 68
Setting detail: OUTPATIENT SURGERY
Discharge: HOME | End: 2023-06-09
Payer: COMMERCIAL

## 2023-06-09 ENCOUNTER — ANESTHESIA EVENT (OUTPATIENT)
Dept: GASTROENTEROLOGY | Facility: HOSPITAL | Age: 68
End: 2023-06-09

## 2023-06-09 ENCOUNTER — ANESTHESIA (OUTPATIENT)
Dept: GASTROENTEROLOGY | Facility: HOSPITAL | Age: 68
End: 2023-06-09

## 2023-06-09 VITALS
OXYGEN SATURATION: 96 % | HEIGHT: 59 IN | TEMPERATURE: 97.5 F | WEIGHT: 212.08 LBS | BODY MASS INDEX: 42.76 KG/M2 | SYSTOLIC BLOOD PRESSURE: 144 MMHG | HEART RATE: 55 BPM | DIASTOLIC BLOOD PRESSURE: 95 MMHG | RESPIRATION RATE: 21 BRPM

## 2023-06-09 DIAGNOSIS — R14.1 ABDOMINAL GAS PAIN: ICD-10-CM

## 2023-06-09 DIAGNOSIS — K21.9 GASTROESOPHAGEAL REFLUX DISEASE WITHOUT ESOPHAGITIS: ICD-10-CM

## 2023-06-09 PROCEDURE — 88305 TISSUE EXAM BY PATHOLOGIST: CPT | Performed by: PATHOLOGY

## 2023-06-09 PROCEDURE — 43239 EGD BIOPSY SINGLE/MULTIPLE: CPT | Performed by: INTERNAL MEDICINE

## 2023-06-09 RX ORDER — PROPOFOL 10 MG/ML
INJECTION, EMULSION INTRAVENOUS AS NEEDED
Status: DISCONTINUED | OUTPATIENT
Start: 2023-06-09 | End: 2023-06-09

## 2023-06-09 RX ORDER — LIDOCAINE HYDROCHLORIDE 20 MG/ML
INJECTION, SOLUTION EPIDURAL; INFILTRATION; INTRACAUDAL; PERINEURAL AS NEEDED
Status: DISCONTINUED | OUTPATIENT
Start: 2023-06-09 | End: 2023-06-09

## 2023-06-09 RX ORDER — SODIUM CHLORIDE, SODIUM LACTATE, POTASSIUM CHLORIDE, CALCIUM CHLORIDE 600; 310; 30; 20 MG/100ML; MG/100ML; MG/100ML; MG/100ML
INJECTION, SOLUTION INTRAVENOUS CONTINUOUS PRN
Status: DISCONTINUED | OUTPATIENT
Start: 2023-06-09 | End: 2023-06-09

## 2023-06-09 RX ADMIN — PROPOFOL 30 MG: 10 INJECTION, EMULSION INTRAVENOUS at 11:26

## 2023-06-09 RX ADMIN — SODIUM CHLORIDE, SODIUM LACTATE, POTASSIUM CHLORIDE, AND CALCIUM CHLORIDE: .6; .31; .03; .02 INJECTION, SOLUTION INTRAVENOUS at 10:25

## 2023-06-09 RX ADMIN — PROPOFOL 120 MG: 10 INJECTION, EMULSION INTRAVENOUS at 11:22

## 2023-06-09 RX ADMIN — LIDOCAINE HYDROCHLORIDE 60 MG: 20 INJECTION, SOLUTION EPIDURAL; INFILTRATION; INTRACAUDAL; PERINEURAL at 11:22

## 2023-06-09 NOTE — ANESTHESIA PREPROCEDURE EVALUATION
Procedure:  EGD    Relevant Problems   CARDIO   (+) Chest pain   (+) HTN (hypertension)        Physical Exam    Airway    Mallampati score: III  TM Distance: >3 FB  Neck ROM: limited     Dental       Cardiovascular      Pulmonary      Other Findings        Anesthesia Plan  ASA Score- 3     Anesthesia Type- IV sedation with anesthesia with ASA Monitors  Additional Monitors:   Airway Plan:     Comment: I have seen the patient and reviewed the history  Patient to receive IV sedation with full ASA monitors  Risks discussed with the patient, consent signed          Plan Factors-Exercise tolerance (METS): >4 METS  Chart reviewed  Patient summary reviewed  Induction- intravenous  Postoperative Plan-     Informed Consent- Anesthetic plan and risks discussed with patient  I personally reviewed this patient with the CRNA  Discussed and agreed on the Anesthesia Plan with the CRNA  Ramy Glynn

## 2023-06-09 NOTE — ANESTHESIA POSTPROCEDURE EVALUATION
Post-Op Assessment Note    CV Status:  Stable  Pain Score: 0    Pain management: adequate     Mental Status:  Alert and awake   Hydration Status:  Stable   PONV Controlled:  None   Airway Patency:  Patent      Post Op Vitals Reviewed: Yes      Staff: CRNA         No notable events documented      /68 (06/09/23 1132)    Temp 97 5 °F (36 4 °C) (06/09/23 1132)    Pulse 63 (06/09/23 1132)   Resp 20 (06/09/23 1132)    SpO2 99 % (06/09/23 1132)

## 2023-06-09 NOTE — H&P
History and Physical - [facility]  Cesar Collier 69 y o  female MRN: [id number]      HPI: Cesar Collier is a 69y o  year old female who presents for evaluation of abdominal pain and gastroesophageal reflux disease      REVIEW OF SYSTEMS: Per the HPI, and otherwise unremarkable  Historical Information   Past Medical History:   Diagnosis Date   • Angina at rest [facility])    • Arthritis   • Back problem    • Coronary artery disease    • CPAP (continuous positive airway pressure) dependence    • Diabetes (HCC)    • Gall stones    • [facility]    • High blood pressure    • Myocardial infarction [facility])    • Stomach problems    • Stroke ([facility] )     pt states she had 4 silent strokes     Past Surgical History:   Procedure Laterality Date   • ANGIOPLASTY     • CHOLECYSTECTOMY     • CORONARY ARTERY BYPASS GRAFT     • CORONARY STENT PLACEMENT      x3   • GALLBLADDER SURGERY     • HYSTERECTOMY     • KNEE SURGERY Right    • OTHER SURGICAL HISTORY      Double Bypass      Social History   Social History     Substance and Sexual Activity   Alcohol Use Not Currently     Social History     Substance and Sexual Activity   Drug Use No     Social History     Tobacco Use   Smoking Status Former   Smokeless Tobacco Never     Family History   Problem Relation Age of Onset   • Breast cancer Mother 46   • Colon cancer Father    • Breast cancer Maternal Aunt    • Ovarian cancer Maternal Aunt    • Breast cancer Maternal Aunt    • Cervical cancer Maternal Aunt    • Uterine cancer Maternal Aunt    • No Known Problems Sister    • No Known Problems Daughter    • No Known Problems Sister    • No Known Problems Sister        Meds/Allergies     (Not in a hospital admission)      Allergies   Allergen Reactions   • Wound Dressing Adhesive      Tape    • Wound Dressings Rash     Swelling, red       Objective     Blood pressure (!) 180/81, pulse 62, temperature 97 9 °F (36 6 °C), temperature source Temporal, resp   rate 17, "height 4' 11\" (1 499 m), weight 96 2 kg (212 lb 1 3 oz), SpO2 95 %, not currently breastfeeding  PHYSICAL EXAM    Gen: NAD  CV: RRR  CHEST: Clear  ABD: soft, NT/ND  EXT: no edema      ASSESSMENT/PLAN:  This is a 69y o  year old female here for EGD, and she is stable and optimized for her procedure          "

## 2023-06-13 PROCEDURE — 88305 TISSUE EXAM BY PATHOLOGIST: CPT | Performed by: PATHOLOGY

## 2023-06-14 ENCOUNTER — TELEPHONE (OUTPATIENT)
Dept: GASTROENTEROLOGY | Facility: CLINIC | Age: 68
End: 2023-06-14

## 2023-06-14 NOTE — TELEPHONE ENCOUNTER
----- Message from Apple Bergeron DO sent at 6/13/2023  5:46 PM EDT -----  The results were given to the patient's MyChart  The patient had benign biopsies of the stomach that were negative for Helicobacter pylori

## 2023-06-14 NOTE — TELEPHONE ENCOUNTER
Called and spoke with patient in regards to her biopsy results as per Dr Rubén Quan  Pt voice understanding

## 2023-08-18 DIAGNOSIS — K21.9 GASTROESOPHAGEAL REFLUX DISEASE WITHOUT ESOPHAGITIS: ICD-10-CM

## 2023-08-18 RX ORDER — PANTOPRAZOLE SODIUM 40 MG/1
40 TABLET, DELAYED RELEASE ORAL DAILY
Qty: 30 TABLET | Refills: 3 | Status: SHIPPED | OUTPATIENT
Start: 2023-08-18

## 2023-12-01 ENCOUNTER — APPOINTMENT (EMERGENCY)
Dept: RADIOLOGY | Facility: HOSPITAL | Age: 68
End: 2023-12-01
Payer: COMMERCIAL

## 2023-12-01 ENCOUNTER — HOSPITAL ENCOUNTER (EMERGENCY)
Facility: HOSPITAL | Age: 68
Discharge: HOME | End: 2023-12-01
Attending: STUDENT IN AN ORGANIZED HEALTH CARE EDUCATION/TRAINING PROGRAM
Payer: COMMERCIAL

## 2023-12-01 VITALS
HEART RATE: 103 BPM | TEMPERATURE: 98.6 F | RESPIRATION RATE: 20 BRPM | OXYGEN SATURATION: 97 % | SYSTOLIC BLOOD PRESSURE: 181 MMHG | DIASTOLIC BLOOD PRESSURE: 88 MMHG

## 2023-12-01 DIAGNOSIS — J44.1 COPD EXACERBATION (HCC): ICD-10-CM

## 2023-12-01 DIAGNOSIS — J21.0 RSV (ACUTE BRONCHIOLITIS DUE TO RESPIRATORY SYNCYTIAL VIRUS): Primary | ICD-10-CM

## 2023-12-01 LAB
2HR DELTA HS TROPONIN: 0 NG/L
ALBUMIN SERPL BCP-MCNC: 4.1 G/DL (ref 3.5–5)
ALP SERPL-CCNC: 85 U/L (ref 34–104)
ALT SERPL W P-5'-P-CCNC: 14 U/L (ref 7–52)
ANION GAP SERPL CALCULATED.3IONS-SCNC: 8 MMOL/L
AST SERPL W P-5'-P-CCNC: 18 U/L (ref 13–39)
BASE EX.OXY STD BLDV CALC-SCNC: 61.6 % (ref 60–80)
BASE EXCESS BLDV CALC-SCNC: 0.9 MMOL/L
BASOPHILS # BLD AUTO: 0.03 THOUSANDS/ÂΜL (ref 0–0.1)
BASOPHILS NFR BLD AUTO: 0 % (ref 0–1)
BILIRUB SERPL-MCNC: 0.75 MG/DL (ref 0.2–1)
BUN SERPL-MCNC: 14 MG/DL (ref 5–25)
CALCIUM SERPL-MCNC: 9.4 MG/DL (ref 8.4–10.2)
CARDIAC TROPONIN I PNL SERPL HS: 4 NG/L
CARDIAC TROPONIN I PNL SERPL HS: 4 NG/L
CHLORIDE SERPL-SCNC: 99 MMOL/L (ref 96–108)
CO2 SERPL-SCNC: 29 MMOL/L (ref 21–32)
CREAT SERPL-MCNC: 0.77 MG/DL (ref 0.6–1.3)
EOSINOPHIL # BLD AUTO: 0.12 THOUSAND/ÂΜL (ref 0–0.61)
EOSINOPHIL NFR BLD AUTO: 1 % (ref 0–6)
ERYTHROCYTE [DISTWIDTH] IN BLOOD BY AUTOMATED COUNT: 12.5 % (ref 11.6–15.1)
FLUAV RNA RESP QL NAA+PROBE: NEGATIVE
FLUBV RNA RESP QL NAA+PROBE: NEGATIVE
GFR SERPL CREATININE-BSD FRML MDRD: 78 ML/MIN/1.73SQ M
GLUCOSE SERPL-MCNC: 112 MG/DL (ref 65–140)
HCO3 BLDV-SCNC: 25.7 MMOL/L (ref 24–30)
HCT VFR BLD AUTO: 42.7 % (ref 34.8–46.1)
HGB BLD-MCNC: 13.9 G/DL (ref 11.5–15.4)
IMM GRANULOCYTES # BLD AUTO: 0.03 THOUSAND/UL (ref 0–0.2)
IMM GRANULOCYTES NFR BLD AUTO: 0 % (ref 0–2)
LYMPHOCYTES # BLD AUTO: 1.41 THOUSANDS/ÂΜL (ref 0.6–4.47)
LYMPHOCYTES NFR BLD AUTO: 14 % (ref 14–44)
MCH RBC QN AUTO: 31 PG (ref 26.8–34.3)
MCHC RBC AUTO-ENTMCNC: 32.6 G/DL (ref 31.4–37.4)
MCV RBC AUTO: 95 FL (ref 82–98)
MONOCYTES # BLD AUTO: 0.94 THOUSAND/ÂΜL (ref 0.17–1.22)
MONOCYTES NFR BLD AUTO: 9 % (ref 4–12)
NEUTROPHILS # BLD AUTO: 7.55 THOUSANDS/ÂΜL (ref 1.85–7.62)
NEUTS SEG NFR BLD AUTO: 76 % (ref 43–75)
NRBC BLD AUTO-RTO: 0 /100 WBCS
O2 CT BLDV-SCNC: 12.9 ML/DL
PCO2 BLDV: 41.4 MM HG (ref 42–50)
PH BLDV: 7.41 [PH] (ref 7.3–7.4)
PLATELET # BLD AUTO: 255 THOUSANDS/UL (ref 149–390)
PMV BLD AUTO: 8.7 FL (ref 8.9–12.7)
PO2 BLDV: 30.5 MM HG (ref 35–45)
POTASSIUM SERPL-SCNC: 4.2 MMOL/L (ref 3.5–5.3)
PROT SERPL-MCNC: 7.9 G/DL (ref 6.4–8.4)
RBC # BLD AUTO: 4.48 MILLION/UL (ref 3.81–5.12)
RSV RNA RESP QL NAA+PROBE: POSITIVE
SARS-COV-2 RNA RESP QL NAA+PROBE: NEGATIVE
SODIUM SERPL-SCNC: 136 MMOL/L (ref 135–147)
WBC # BLD AUTO: 10.08 THOUSAND/UL (ref 4.31–10.16)

## 2023-12-01 PROCEDURE — 82805 BLOOD GASES W/O2 SATURATION: CPT

## 2023-12-01 PROCEDURE — 94760 N-INVAS EAR/PLS OXIMETRY 1: CPT

## 2023-12-01 PROCEDURE — 93005 ELECTROCARDIOGRAM TRACING: CPT

## 2023-12-01 PROCEDURE — 99285 EMERGENCY DEPT VISIT HI MDM: CPT

## 2023-12-01 PROCEDURE — 96360 HYDRATION IV INFUSION INIT: CPT

## 2023-12-01 PROCEDURE — 94640 AIRWAY INHALATION TREATMENT: CPT

## 2023-12-01 PROCEDURE — 71046 X-RAY EXAM CHEST 2 VIEWS: CPT

## 2023-12-01 PROCEDURE — 80053 COMPREHEN METABOLIC PANEL: CPT

## 2023-12-01 PROCEDURE — 36415 COLL VENOUS BLD VENIPUNCTURE: CPT

## 2023-12-01 PROCEDURE — 94644 CONT INHLJ TX 1ST HOUR: CPT

## 2023-12-01 PROCEDURE — 0241U HB NFCT DS VIR RESP RNA 4 TRGT: CPT

## 2023-12-01 PROCEDURE — 85025 COMPLETE CBC W/AUTO DIFF WBC: CPT

## 2023-12-01 PROCEDURE — 94664 DEMO&/EVAL PT USE INHALER: CPT

## 2023-12-01 PROCEDURE — 84484 ASSAY OF TROPONIN QUANT: CPT

## 2023-12-01 RX ORDER — SODIUM CHLORIDE FOR INHALATION 0.9 %
12 VIAL, NEBULIZER (ML) INHALATION ONCE
Status: COMPLETED | OUTPATIENT
Start: 2023-12-01 | End: 2023-12-01

## 2023-12-01 RX ORDER — ALBUTEROL SULFATE 2.5 MG/3ML
10 SOLUTION RESPIRATORY (INHALATION) ONCE
Status: COMPLETED | OUTPATIENT
Start: 2023-12-01 | End: 2023-12-01

## 2023-12-01 RX ORDER — PREDNISONE 20 MG/1
50 TABLET ORAL DAILY
Qty: 13 TABLET | Refills: 0 | Status: SHIPPED | OUTPATIENT
Start: 2023-12-01 | End: 2023-12-06

## 2023-12-01 RX ORDER — IPRATROPIUM BROMIDE AND ALBUTEROL SULFATE 2.5; .5 MG/3ML; MG/3ML
3 SOLUTION RESPIRATORY (INHALATION) EVERY 6 HOURS PRN
Qty: 60 ML | Refills: 0 | Status: SHIPPED | OUTPATIENT
Start: 2023-12-01

## 2023-12-01 RX ORDER — AZITHROMYCIN 250 MG/1
TABLET, FILM COATED ORAL
Qty: 6 TABLET | Refills: 0 | Status: SHIPPED | OUTPATIENT
Start: 2023-12-01 | End: 2023-12-05

## 2023-12-01 RX ADMIN — PREDNISONE 50 MG: 20 TABLET ORAL at 22:48

## 2023-12-01 RX ADMIN — Medication 12 ML: at 19:24

## 2023-12-01 RX ADMIN — IPRATROPIUM BROMIDE 1 MG: 0.5 SOLUTION RESPIRATORY (INHALATION) at 19:24

## 2023-12-01 RX ADMIN — ALBUTEROL SULFATE 10 MG: 2.5 SOLUTION RESPIRATORY (INHALATION) at 19:29

## 2023-12-01 RX ADMIN — SODIUM CHLORIDE 1000 ML: 0.9 INJECTION, SOLUTION INTRAVENOUS at 21:31

## 2023-12-01 NOTE — ED PROVIDER NOTES
History  Chief Complaint   Patient presents with    Shortness of Breath     Pt c/o SOB worsening over past 3 days, pt states she has been sick for a couple weeks     HPI  77 y.o. female with a history of COPD presents with a chief complaints of shortness of breath. Patient conversant upon initial evaluation, able to provide information regarding history. Patient without signs of hypoxia and initial pulse oxymetry without hypoxemia; no immediate intervention required. Patient is not home oxygen. Patient affirms 3 days of moderate-severe dyspnea that started gradually and progressively worsened. Patient states exertion and lying supine worsens the dyspnea and rest improves the dyspnea. Patient affirms dry cough. Patient affirms sharp chest pain yesterday but no current chest pain  Patient denies acute leg pain or swelling. Patient denies any significant acute weight gain. Patient denies any fever/chills. Patient associates cough and shortness of breath and denies fever, chills, syncope, wheezing, hematochezia or melanotic stools, nausea/vomiting, nonew rashes, diaphoresis, no palpitations, weakness, dizziness, syncope, focal weakness or no paresthesia. All other review of systems reviewed and noted to be negative. Patient affirms a history of atherosclerotic disease (CAD/TIA/CVA/PAD). Patient affirms any history of prior cardiopulmonary surgery. Patient denies any use of tobacco in the past 90 days. Patient denies any history of DVT or PE.    ---  Patient denies any immobilization of at least 3 days or surgery in the past 4 weeks. Patient denies any history or family history of thrombophilia. Patient denies any malignancy with treatment within the past 6 months. Patient denies any use of exogenous estrogen containing compounds. Patient denies any extended travel greater than 10 hours [facility] [place] [facility]). ---    Focused Objective.   PHYSICAL EXAM:  Constitutional:  No acute distress  Neck:  No asymmetry without obvious masses or swelling; no tracheal deviation. No jugular venous distention. No stridor. No bruit. CV:  Tachycardic 101 and sinus rhythm. No S3. No murmur. Peripheral pulses intact and equal.  Respiratory:  Normal inspection with no rash, signs of infection, or trauma. No intercostal, supraclavicular, subcostal, or substernal retractions. No tenderness or crepitus on palpitation. Auscultation demonstrates vesicular breath sounds with mild decreased air movement. History provided by:  Patient   used: No    Shortness of Breath      Prior to Admission Medications   Prescriptions Last Dose Informant Patient Reported? Taking?    Omega-3 1000 MG CAPS  Self Yes No   Sig: Take by mouth   acetaminophen (TYLENOL) 325 mg tablet  Self Yes No   Sig: every 6 (six) hours   albuterol (2.5 mg/3 mL) 0.083 % nebulizer solution  Self Yes No   amLODIPine (NORVASC) 5 mg tablet  Self Yes No   aspirin 81 MG tablet  Self Yes No   Sig: Take by mouth   atorvastatin (LIPITOR) 80 mg tablet  Self Yes No   Sig: Take 80 mg by mouth   calcium-vitamin D 250-100 MG-UNIT per tablet  Self Yes No   Sig: Take 1 tablet by mouth 2 (two) times a day   clopidogrel (PLAVIX) 75 mg tablet  Self Yes No   Sig: TAKE 1 TABLET DAILY   clotrimazole-betamethasone (LOTRISONE) 1-0.05 % cream  Self No No   Sig: Apply topically 2 (two) times a day   conjugated estrogens (PREMARIN) vaginal cream  Self No No   Sig: Insert 0.5 g into the vagina 2 (two) times a week for 30 days   cyanocobalamin (VITAMIN B-12) 100 mcg tablet  Self Yes No   Sig: Take 1 tablet by mouth   diazepam (VALIUM) 10 mg tablet  Self Yes No   Sig: Take 10 mg by mouth   Patient not taking: Reported on 4/21/2023   famotidine (PEPCID) 40 MG tablet  Self No No   Sig: Take 1 tablet (40 mg total) by mouth daily   guaiFENesin (ROBITUSSIN) 100 MG/5ML oral liquid  Self Yes No   Sig: Take 100 mg by mouth Three times daily as needed isosorbide mononitrate (IMDUR) 30 mg 24 hr tablet  Self No No   Sig: Take 1 tablet (30 mg total) by mouth daily   losartan (COZAAR) 50 mg tablet  Self Yes No   metoprolol succinate (TOPROL-XL) 25 mg 24 hr tablet  Self Yes No   metoprolol succinate (TOPROL-XL) 50 mg 24 hr tablet  Self Yes No   montelukast (SINGULAIR) 10 mg tablet  Self Yes No   nitroglycerin (NITROSTAT) 0.4 mg SL tablet  Self Yes No   Sig: Take 1 tablet by mouth Three times a day   nystatin (MYCOSTATIN) cream  Self Yes No   ondansetron (ZOFRAN-ODT) 4 mg disintegrating tablet  Self Yes No   Sig: Take 4 mg by mouth every 8 (eight) hours as needed   pantoprazole (PROTONIX) 40 mg tablet   No No   Sig: TAKE ONE TABLET BY MOUTH ONCE DAILY   sertraline (ZOLOFT) 50 mg tablet  Self Yes No   Sig: sertraline 50 mg tablet   Patient not taking: Reported on 4/21/2023   terconazole (TERAZOL 7) 0.4 % vaginal cream  Self No No   Sig: Insert 1 applicator into the vagina daily at bedtime   vilazodone (VIIBRYD) 10 mg tablet  Self Yes No   zolpidem (AMBIEN) 10 mg tablet  Self Yes No   Sig: Take 10 mg by mouth daily      Facility-Administered Medications: None       Past Medical History:   Diagnosis Date    Angina at rest     Arthritis .     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 strokes       Past Surgical History:   Procedure Laterality Date    ANGIOPLASTY      CHOLECYSTECTOMY      CORONARY ARTERY BYPASS GRAFT      CORONARY STENT PLACEMENT      x3    GALLBLADDER SURGERY      HYSTERECTOMY      KNEE SURGERY Right     OTHER SURGICAL HISTORY      Double Bypass        Family History   Problem Relation Age of Onset    Breast cancer Mother 46    Colon cancer Father     Breast cancer Maternal Aunt     Ovarian cancer Maternal Aunt Breast cancer Maternal Aunt     Cervical cancer Maternal Aunt     Uterine cancer Maternal Aunt     No Known Problems Sister     No Known Problems Daughter     No Known Problems Sister     No Known Problems Sister      I have reviewed and agree with the history as documented. E-Cigarette/Vaping    E-Cigarette Use Never User      E-Cigarette/Vaping Substances     Social History     Tobacco Use    Smoking status: Former    Smokeless tobacco: Never   Vaping Use    Vaping Use: Never used   Substance Use Topics    Alcohol use: Not Currently    Drug use: No       Review of Systems   Respiratory:  Positive for shortness of breath.         Physical Exam  Physical Exam    Vital Signs  ED Triage Vitals [12/01/23 1743]   Temperature Pulse Respirations Blood Pressure SpO2   98.6 °F (37 °C) 101 18 (!) 193/78 92 %      Temp Source Heart Rate Source Patient Position - Orthostatic VS BP Location FiO2 (%)   Oral Monitor Sitting Left arm --      Pain Score       --           Vitals:    12/01/23 1743   BP: (!) 193/78   Pulse: 101   Patient Position - Orthostatic VS: Sitting         Visual Acuity      ED Medications  Medications   albuterol inhalation solution 10 mg (has no administration in time range)   ipratropium (ATROVENT) 0.02 % inhalation solution 1 mg (has no administration in time range)   sodium chloride 0.9 % inhalation solution 12 mL (has no administration in time range)       Diagnostic Studies  Results Reviewed       Procedure Component Value Units Date/Time    CBC and differential [[id number]]     Lab Status: No result Specimen: Blood     Comprehensive metabolic panel [[id number]]     Lab Status: No result Specimen: Blood     HS Troponin 0hr (reflex protocol) [[id number]]     Lab Status: No result Specimen: Blood     FLU/RSV/COVID - if FLU/RSV clinically relevant [[id number]]     Lab Status: No result Specimen: Nares from Nose     Blood gas, venous [[id number]]     Lab Status: No result Specimen: Blood                    XR chest 2 views    (Results Pending)              Procedures  Procedures         ED Course  ED Course as of 12/01/23 2235   [facility] Dec 01, 2023   1821 Blood Pressure(!): 193/78   1923 CBC and differential(!)   1939 Comprehensive metabolic panel   4629 SpO2: 97 %   1948 hs TnI 0hr: 4   2140 Delta 2hr hsTnI: 0   2216 RSV PCR(!): Positive                               SBIRT 20yo+      Flowsheet Row Most Recent Value   Initial Alcohol Screen: US AUDIT-C     1. How often do you have a drink containing alcohol? 0 Filed at: 12/01/2023 1759   2. How many drinks containing alcohol do you have on a typical day you are drinking? 0 Filed at: 12/01/2023 1759   3b. FEMALE Any Age, or MALE 65+: How often do you have 4 or more drinks on one occassion? 0 Filed at: 12/01/2023 1759   Audit-C Score 0 Filed at: 12/01/2023 1759   HORACIO: How many times in the past year have you. .. Used an illegal drug or used a prescription medication for non-medical reasons? Never Filed at: 12/01/2023 1759                      Medical Decision Making  Medical Decision Making  Patient presents with a history of COPD presents with increasing dyspnea representing a broad differential, including multiple emergent diagnoses. Given the large differential, the decision making in this case is of high complexity however based on patient's clinical history and symptoms, concerns for possible exacerbation of known pulmonary pathology. Patient presents afebrile without history of fever/chills making infectious etiologies less likely based upon available information. EKG obtained and reviewed independently by myself, which was interpreted by myself as listed under ED course. Patient placed on cardiac monitoring. CXR will be obtained to evaluate for alternative pathologies, including pneumothorax, pulmonary edema, or pneumonia. Laboratory analysis including CBC to evaluate for anemia and evaluate potential for significant leukocytosis.  Will obtain BMP to evaluate renal function and electrolytes including possibility of metabolic derangement accounting for patient's symptoms. Considering patient's history, will obtain troponin to evaluate for ACS. Will obtain VBG to further evaluate possibility of metabolic derangement as a component of the patient's symptoms and evaluate the chronicity of the possible symptamatology. ---  Patient has symptoms and history concerning for possible pulmonary embolism. Regarding this etiology, patient's risk stratification by the Wells' Criteria for Pulmonary Embolism (Two Tier Model): No - clinical signs or symptoms of DVT (3)  No - PE most likely diagnosis (3)  Yes - Heart rate greater than 100 (1.5)  No - Immobilization at least 3 days OR surgery in the previous 4 weeks (1.5)  No - Previous, objectively diagnosed PE or DVT (1.5)  No - Hemoptysis (1)  No - Malignancy with treatment within 6 months or palliative (1)    Patient has a Wells' score of greater than less than 4 points. Patient's risk further evaluated by the [facility] Criteria for Pulmonary Embolism:  Yes - age is greater than or equal to 48  Yes - Heart rate greater than 100  No - O2 sat on room air < 95%  No - Prior history of PE or DVT  No - Recent trauma or surgery  No - Hemoptysis  No - Use of exogenous estrogen  No - Unilateral leg swelling      Patient has a low risk Wells' criteria and is able to be cleared via the [facility] criteria. This demonstrated a low risk for pulmonary embolism. I have explained this to the patient in detail with the clinical signs and symptoms of DVT and PE and for them to seek additional evaluation if these symptoms present or worsen. ---    Patient will be monitored on cardiopulmonary monitoring and patient will be treated symptomatically.     Considering persistent dyspnea with signs concerning for COPD exacerbation, will treat patient with nebulized beta adrenergic agonists and anticholinergic medications while monitoring patient. Patient is able to tolerate PO medications. To expedite treatment and considering equal efficacy to intravenous corticosteroids, prednisone will be administered to the patient as a single dose of 50 mg.    ---  Patient with increased dyspnea. Patient with increased sputum production. Patient without increased sputum purulence. Patient has greater than one of the three cardinal symptoms of COPD exacerbation and will be initiated on empiric antibiotics in accordance with the GOLD guidelines. Patient has no known risk factors for complicated COPD so patient will be initiated on azithromycin 500 mg once a day for 3 days. I have discussed risks and benefits of antibiotic treatment with the patient who has consent to treatment. ---  Considering likely exacerbation of COPD, will administer intravenous magnesium as adjunctive therapy for possible bronchodilatory effect. Patient being monitored with cardiac monitoring throughout administration. Will monitor patient and reassess regarding disposition after initial treatment and evaluation is completed. Patient has RSV, discussed result and symptomatic treatment, patient is breathing better after the breathing treatment and is better and would like to go home  - Discussed strict return precautions and when to return to the ER, symptomatic relief tylenol prn, hydration, OTC medicines, steroids and zpak, nebulizer treatment for wheezing and shortness of breath  - Patient understands return precautions, medications and follow up with PCP for symptom progression and management and elevated blood pressure    Problems Addressed:  COPD exacerbation ([address]): acute illness or injury  RSV (acute bronchiolitis due to respiratory syncytial virus): acute illness or injury    Amount and/or Complexity of Data Reviewed  Labs: ordered. Decision-making details documented in ED Course. Radiology: ordered. Risk  Prescription drug management. Disposition  Final diagnoses:   None     ED Disposition       None          Follow-up Information    None         Patient's Medications   Discharge Prescriptions    No medications on file       No discharge procedures on file.     PDMP Review       None            ED Provider  Electronically Signed by             Tyshawn Zayas PA-C  12/01/23 6710

## 2023-12-02 NOTE — DISCHARGE INSTRUCTIONS
Follow up with primary care physician. Take medication as directed. If any symptoms worsen or new symptoms appear come back to the ER.

## 2023-12-03 LAB
ATRIAL RATE: 103 BPM
P AXIS: 76 DEGREES
PR INTERVAL: 184 MS
QRS AXIS: 53 DEGREES
QRSD INTERVAL: 82 MS
QT INTERVAL: 352 MS
QTC INTERVAL: 461 MS
T WAVE AXIS: 96 DEGREES
VENTRICULAR RATE: 103 BPM

## 2024-01-30 PROBLEM — J21.0 RSV (ACUTE BRONCHIOLITIS DUE TO RESPIRATORY SYNCYTIAL VIRUS): Status: RESOLVED | Noted: 2023-12-01 | Resolved: 2024-01-30

## 2024-06-27 RX ORDER — METHYLPREDNISOLONE 4 MG/1
4 TABLET ORAL
COMMUNITY

## 2024-06-27 RX ORDER — BLOOD-GLUCOSE METER
EACH MISCELLANEOUS
COMMUNITY

## 2024-06-27 RX ORDER — LANCETS 33 GAUGE
EACH MISCELLANEOUS
COMMUNITY

## 2024-06-28 ENCOUNTER — OFFICE VISIT (OUTPATIENT)
Dept: GASTROENTEROLOGY | Facility: CLINIC | Age: 69
End: 2024-06-28
Payer: COMMERCIAL

## 2024-06-28 VITALS
BODY MASS INDEX: 44.76 KG/M2 | WEIGHT: 222 LBS | HEART RATE: 64 BPM | TEMPERATURE: 97.3 F | OXYGEN SATURATION: 96 % | HEIGHT: 59 IN | SYSTOLIC BLOOD PRESSURE: 156 MMHG | DIASTOLIC BLOOD PRESSURE: 78 MMHG

## 2024-06-28 DIAGNOSIS — E66.01 MORBID OBESITY WITH BODY MASS INDEX (BMI) OF 45.0 TO 49.9 IN ADULT (HCC): ICD-10-CM

## 2024-06-28 DIAGNOSIS — K21.9 GASTROESOPHAGEAL REFLUX DISEASE WITHOUT ESOPHAGITIS: ICD-10-CM

## 2024-06-28 DIAGNOSIS — R10.13 EPIGASTRIC PAIN: Primary | ICD-10-CM

## 2024-06-28 PROCEDURE — 99214 OFFICE O/P EST MOD 30 MIN: CPT | Performed by: INTERNAL MEDICINE

## 2024-06-28 RX ORDER — ALBUTEROL SULFATE 90 UG/1
AEROSOL, METERED RESPIRATORY (INHALATION)
COMMUNITY
Start: 2024-06-06

## 2024-06-28 RX ORDER — ALPRAZOLAM 0.25 MG/1
0.25 TABLET ORAL DAILY PRN
COMMUNITY
Start: 2024-06-19

## 2024-06-28 RX ORDER — PANTOPRAZOLE SODIUM 40 MG/1
40 TABLET, DELAYED RELEASE ORAL
Qty: 62 TABLET | Refills: 6 | Status: SHIPPED | OUTPATIENT
Start: 2024-06-28

## 2024-06-30 NOTE — PROGRESS NOTES
[facility] - Outpatient Follow-up Note  Cristine Snell 68 y.o. female MRN: [id number]  Encounter: [id number]          ASSESSMENT AND PLAN:      1. Epigastric pain  -EGD performed 6/9/2023 reviewed  - pantoprazole (PROTONIX) 40 mg tablet; Take 1 tablet (40 mg total) by mouth 2 (two) times a day before meals  Dispense: 62 tablet; Refill: 6 the pantoprazole has been increased from once daily to twice daily dosing to be taken for the next month.  If the patient continues to have persisting symptoms despite the twice daily dosing, then I will need to repeat EGD.    2. Morbid obesity with body mass index (BMI) of 45.0 to 49.9 in adult ([facility])    3. Gastroesophageal reflux disease without esophagitis  -No lying down within 1 hour of snacking or eating  -Avoidance of the trigger foods as described on the GERD handout sheet  ______________________________________________________________________    SUBJECTIVE: Delmy is a 68-year-old female who comes to the office today for routine follow-up.  She underwent EGD on 6/9/2023.  The findings were normal.  She states that she is currently taking pantoprazole 40 mg once daily.  She is experiencing abdominal pain in the upper abdomen for the past month.  There is also associated heartburn as well as nausea.  There is no vomiting or diarrhea.  She denies constipation, rectal bleeding, hematemesis, melena.  She denied weight loss.      REVIEW OF SYSTEMS IS OTHERWISE NEGATIVE.      Historical Information   Past Medical History:   Diagnosis Date    Angina at rest     Arthritis .    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     Past Surgical History:   Procedure Laterality Date    ANGIOPLASTY      CHOLECYSTECTOMY       CORONARY ARTERY BYPASS GRAFT      CORONARY STENT PLACEMENT      x3    GALLBLADDER SURGERY      HYSTERECTOMY      KNEE SURGERY Right     OTHER SURGICAL HISTORY      Double Bypass      Social History   Social History     Substance and Sexual Activity   Alcohol Use Not Currently     Social History     Substance and Sexual Activity   Drug Use No     Social History     Tobacco Use   Smoking Status Former   Smokeless Tobacco Never     Family History   Problem Relation Age of Onset    Breast cancer Mother 52    Colon cancer Father     Breast cancer Maternal Aunt     Ovarian cancer Maternal Aunt     Breast cancer Maternal Aunt     Cervical cancer Maternal Aunt     Uterine cancer Maternal Aunt     No Known Problems Sister     No Known Problems Daughter     No Known Problems Sister     No Known Problems Sister        Meds/Allergies       Current Outpatient Medications:     acetaminophen (TYLENOL) 325 mg tablet    albuterol (PROVENTIL HFA,VENTOLIN HFA) 90 mcg/act inhaler    ALPRAZolam (XANAX) 0.25 mg tablet    amLODIPine (NORVASC) 5 mg tablet    aspirin 81 MG tablet    atorvastatin (LIPITOR) 80 mg tablet    Blood Glucose Monitoring Suppl (ONE TOUCH ULTRA 2) w/Device KIT    calcium-vitamin D 250-100 MG-UNIT per tablet    clopidogrel (PLAVIX) 75 mg tablet    cyanocobalamin (VITAMIN B-12) 100 mcg tablet    glucose blood test strip    guaiFENesin (ROBITUSSIN) 100 MG/5ML oral liquid    ipratropium-albuterol (DUO-NEB) 0.5-2.5 mg/3 mL nebulizer solution    isosorbide mononitrate (IMDUR) 30 mg 24 hr tablet    Lancets (OneTouch Delica Plus Fylsug14N) MISC    losartan (COZAAR) 50 mg tablet    metoprolol succinate (TOPROL-XL) 50 mg 24 hr tablet    montelukast (SINGULAIR) 10 mg tablet    nitroglycerin (NITROSTAT) 0.4 mg SL tablet    Omega-3 1000 MG CAPS    pantoprazole (PROTONIX) 40 mg tablet    pantoprazole (PROTONIX) 40 mg tablet    zolpidem (AMBIEN) 10 mg tablet    methylPREDNISolone 4 MG tablet therapy pack    nystatin (MYCOSTATIN)  "cream    No Known Allergies        Objective     Blood pressure 156/78, pulse 64, temperature (!) 97.3 °F (36.3 °C), temperature source Temporal, height 4' 11\" (1.499 m), weight 101 kg (222 lb), SpO2 96%, not currently breastfeeding. Body mass index is 44.84 kg/m².      PHYSICAL EXAM:      General Appearance:   Alert, cooperative, no distress   HEENT:   Normocephalic, atraumatic, anicteric.     Neck:  Supple, symmetrical, trachea midline   Lungs:   Clear to auscultation bilaterally; no rales, rhonchi or wheezing; respirations unlabored    Heart::   Regular rate and rhythm; no murmur, rub, or gallop.   Abdomen:   Soft, non-tender, non-distended; normal bowel sounds; no masses, no organomegaly    Genitalia:   Deferred    Rectal:   Deferred    Extremities:  No cyanosis, clubbing or edema    Pulses:  2+ and symmetric    Skin:  No jaundice, rashes, or lesions    Lymph nodes:  No palpable cervical lymphadenopathy        Lab Results:   No visits with results within 1 Day(s) from this visit.   Latest known visit with results is:   Admission on 12/01/2023, Discharged on 12/01/2023   Component Date Value    WBC 12/01/2023 10.08     RBC 12/01/2023 4.48     Hemoglobin 12/01/2023 13.9     Hematocrit 12/01/2023 42.7     MCV 12/01/2023 95     MCH 12/01/2023 31.0     MCHC 12/01/2023 32.6     RDW 12/01/2023 12.5     MPV 12/01/2023 8.7 (L)     Platelets 12/01/2023 255     nRBC 12/01/2023 0     Segmented % 12/01/2023 76 (H)     Immature Grans % 12/01/2023 0     Lymphocytes % 12/01/2023 14     Monocytes % 12/01/2023 9     Eosinophils Relative 12/01/2023 1     Basophils Relative 12/01/2023 0     Absolute Neutrophils 12/01/2023 7.55     Absolute Immature Grans 12/01/2023 0.03     Absolute Lymphocytes 12/01/2023 1.41     Absolute Monocytes 12/01/2023 0.94     Eosinophils Absolute 12/01/2023 0.12     Basophils Absolute 12/01/2023 0.03     Sodium 12/01/2023 136     Potassium 12/01/2023 4.2     Chloride 12/01/2023 99     CO2 12/01/2023 29  "    ANION GAP 12/01/2023 8     BUN 12/01/2023 14     Creatinine 12/01/2023 0.77     Glucose 12/01/2023 112     Calcium 12/01/2023 9.4     AST 12/01/2023 18     ALT 12/01/2023 14     Alkaline Phosphatase 12/01/2023 85     Total Protein 12/01/2023 7.9     Albumin 12/01/2023 4.1     Total Bilirubin 12/01/2023 0.75     eGFR 12/01/2023 78     hs TnI 0hr 12/01/2023 4     SARS-CoV-2 12/01/2023 Negative     INFLUENZA A PCR 12/01/2023 Negative     INFLUENZA B PCR 12/01/2023 Negative     RSV PCR 12/01/2023 Positive (A)     pH, [place] 12/01/2023 7.410 (H)     pCO2, [place] 12/01/2023 41.4 (L)     pO2, [place] 12/01/2023 30.5 (L)     HCO3, [place] 12/01/2023 25.7     Base Excess, [place] 12/01/2023 0.9     O2 Content, [place] 12/01/2023 12.9     O2 HGB, VENOUS 12/01/2023 61.6     Ventricular Rate 12/01/2023 103     Atrial Rate 12/01/2023 103     TN Interval 12/01/2023 184     QRSD Interval 12/01/2023 82     QT Interval 12/01/2023 352     QTC Interval 12/01/2023 461     P Axis 12/01/2023 76     QRS [place] 12/01/2023 53     T Wave [place] 12/01/2023 96     hs TnI 2hr 12/01/2023 4     Delta 2hr hsTnI 12/01/2023 0          Radiology Results:   XR shoulder 2+ vw right    Result Date: 6/24/2024  Narrative: EXAM: XR SHOULDER 2+ VW RIGHT COMPARISON: None available HISTORY:  Acute pain of the right shoulder FINDINGS: No acute bony abnormality.   Alignment appears normal. Joint spaces are maintained. The imaged lungs and soft tissues are unremarkable.   Sternotomy wires are partially imaged, the second superior most of which appears fractured.    Impression: IMPRESSION: No acute bony abnormality. Workstation:[id number]    XR knee 4+ vw left injury    Result Date: 6/24/2024  Narrative: EXAM: XR KNEE 4+ VW LEFT COMPARISON: Left knee MRI 5/19/2010 HISTORY:  Chronic left knee pain FINDINGS: No acute bony abnormality. Sclerotic changes in the lateral femoral condyles suspicious for avascular necrosis. Tricompartmental osteoarthritic degenerative changes. Knee joint  effusion. Vascular calcifications in the thigh and leg.    Impression: IMPRESSION: 1.  Sclerotic changes in the lateral femoral condyles suspicious for avascular necrosis. 2.  Tricompartmental osteoarthritic degenerative changes. Knee joint effusion. Workstation:[id number]    Answers submitted by the patient for this visit:  Abdominal Pain Questionnaire (Submitted on 6/25/2024)  Chief Complaint: Abdominal pain  Chronicity: new  Onset: more than 1 month ago  Onset quality: gradual  Frequency: intermittently  Progression since onset: gradually worsening  Pain location: generalized abdominal region  Pain - numeric: 9/10  Pain quality: a sensation of fullness  Radiates to: epigastric region  anorexia: No  arthralgias: Yes  belching: No  constipation: Yes  diarrhea: No  dysuria: No  fever: No  flatus: No  frequency: Yes  headaches: No  hematochezia: No  hematuria: No  melena: No  myalgias: Yes  nausea: Yes  weight loss: No  vomiting: No  Aggravated by: movement  Relieved by: nothing

## 2024-08-02 ENCOUNTER — TELEPHONE (OUTPATIENT)
Dept: GASTROENTEROLOGY | Facility: CLINIC | Age: 69
End: 2024-08-02

## 2024-12-20 DIAGNOSIS — R10.13 EPIGASTRIC PAIN: ICD-10-CM

## 2024-12-23 RX ORDER — PANTOPRAZOLE SODIUM 40 MG/1
40 TABLET, DELAYED RELEASE ORAL
Qty: 180 TABLET | Refills: 1 | Status: SHIPPED | OUTPATIENT
Start: 2024-12-23

## 2025-05-19 ENCOUNTER — HOSPITAL ENCOUNTER (INPATIENT)
Facility: HOSPITAL | Age: 70
LOS: 4 days | Discharge: HOME HEALTH | DRG: 871 | End: 2025-05-24
Attending: EMERGENCY MEDICINE | Admitting: FAMILY MEDICINE
Payer: COMMERCIAL

## 2025-05-19 ENCOUNTER — APPOINTMENT (EMERGENCY)
Dept: RADIOLOGY | Facility: HOSPITAL | Age: 70
DRG: 871 | End: 2025-05-19
Payer: COMMERCIAL

## 2025-05-19 ENCOUNTER — APPOINTMENT (EMERGENCY)
Dept: CT IMAGING | Facility: HOSPITAL | Age: 70
DRG: 871 | End: 2025-05-19
Payer: COMMERCIAL

## 2025-05-19 DIAGNOSIS — K52.9 ENTERITIS: ICD-10-CM

## 2025-05-19 DIAGNOSIS — I50.9 ACUTE CHF (CONGESTIVE HEART FAILURE) (HCC): ICD-10-CM

## 2025-05-19 DIAGNOSIS — J44.1 COPD EXACERBATION (HCC): ICD-10-CM

## 2025-05-19 DIAGNOSIS — M79.10 MUSCLE PAIN: ICD-10-CM

## 2025-05-19 DIAGNOSIS — A41.9 SEPSIS (HCC): ICD-10-CM

## 2025-05-19 DIAGNOSIS — J18.9 PNEUMONIA: Primary | ICD-10-CM

## 2025-05-19 LAB
2HR DELTA HS TROPONIN: -1 NG/L
ALBUMIN SERPL BCG-MCNC: 4 G/DL (ref 3.5–5)
ALP SERPL-CCNC: 67 U/L (ref 34–104)
ALT SERPL W P-5'-P-CCNC: 16 U/L (ref 7–52)
ANION GAP SERPL CALCULATED.3IONS-SCNC: 10 MMOL/L (ref 4–13)
AST SERPL W P-5'-P-CCNC: 29 U/L (ref 13–39)
BASOPHILS # BLD MANUAL: 0 THOUSAND/UL (ref 0–0.1)
BASOPHILS NFR MAR MANUAL: 0 % (ref 0–1)
BILIRUB SERPL-MCNC: 0.67 MG/DL (ref 0.2–1)
BNP SERPL-MCNC: 43 PG/ML (ref 0–100)
BUN SERPL-MCNC: 15 MG/DL (ref 5–25)
CALCIUM SERPL-MCNC: 9 MG/DL (ref 8.4–10.2)
CARDIAC TROPONIN I PNL SERPL HS: 18 NG/L (ref ?–50)
CARDIAC TROPONIN I PNL SERPL HS: 19 NG/L (ref ?–50)
CHLORIDE SERPL-SCNC: 96 MMOL/L (ref 96–108)
CO2 SERPL-SCNC: 27 MMOL/L (ref 21–32)
CREAT SERPL-MCNC: 0.97 MG/DL (ref 0.6–1.3)
D DIMER PPP FEU-MCNC: 0.89 UG/ML FEU
EOSINOPHIL # BLD MANUAL: 0 THOUSAND/UL (ref 0–0.4)
EOSINOPHIL NFR BLD MANUAL: 0 % (ref 0–6)
ERYTHROCYTE [DISTWIDTH] IN BLOOD BY AUTOMATED COUNT: 12.8 % (ref 11.6–15.1)
FLUAV RNA RESP QL NAA+PROBE: NEGATIVE
FLUBV RNA RESP QL NAA+PROBE: NEGATIVE
GFR SERPL CREATININE-BSD FRML MDRD: 58 ML/MIN/1.73SQ M
GLUCOSE SERPL-MCNC: 124 MG/DL (ref 65–140)
HCT VFR BLD AUTO: 41.6 % (ref 34.8–46.1)
HGB BLD-MCNC: 12.8 G/DL (ref 11.5–15.4)
LACTATE SERPL-SCNC: 1 MMOL/L (ref 0.5–2)
LYMPHOCYTES # BLD AUTO: 16 % (ref 14–44)
LYMPHOCYTES # BLD AUTO: 2.16 THOUSAND/UL (ref 0.6–4.47)
MCH RBC QN AUTO: 29.5 PG (ref 26.8–34.3)
MCHC RBC AUTO-ENTMCNC: 30.8 G/DL (ref 31.4–37.4)
MCV RBC AUTO: 96 FL (ref 82–98)
MONOCYTES # BLD AUTO: 1.49 THOUSAND/UL (ref 0–1.22)
MONOCYTES NFR BLD: 11 % (ref 4–12)
NEUTROPHILS # BLD MANUAL: 9.86 THOUSAND/UL (ref 1.85–7.62)
NEUTS SEG NFR BLD AUTO: 73 % (ref 43–75)
PLATELET # BLD AUTO: 227 THOUSANDS/UL (ref 149–390)
PLATELET BLD QL SMEAR: ADEQUATE
PMV BLD AUTO: 8.7 FL (ref 8.9–12.7)
POLYCHROMASIA BLD QL SMEAR: PRESENT
POTASSIUM SERPL-SCNC: 3.6 MMOL/L (ref 3.5–5.3)
PROT SERPL-MCNC: 7.7 G/DL (ref 6.4–8.4)
RBC # BLD AUTO: 4.34 MILLION/UL (ref 3.81–5.12)
RBC MORPH BLD: NORMAL
RSV RNA RESP QL NAA+PROBE: NEGATIVE
SARS-COV-2 RNA RESP QL NAA+PROBE: NEGATIVE
SODIUM SERPL-SCNC: 133 MMOL/L (ref 135–147)
WBC # BLD AUTO: 13.51 THOUSAND/UL (ref 4.31–10.16)

## 2025-05-19 PROCEDURE — 84484 ASSAY OF TROPONIN QUANT: CPT

## 2025-05-19 PROCEDURE — 85027 COMPLETE CBC AUTOMATED: CPT

## 2025-05-19 PROCEDURE — 71046 X-RAY EXAM CHEST 2 VIEWS: CPT

## 2025-05-19 PROCEDURE — 85007 BL SMEAR W/DIFF WBC COUNT: CPT

## 2025-05-19 PROCEDURE — 96367 TX/PROPH/DG ADDL SEQ IV INF: CPT

## 2025-05-19 PROCEDURE — 80053 COMPREHEN METABOLIC PANEL: CPT

## 2025-05-19 PROCEDURE — 85379 FIBRIN DEGRADATION QUANT: CPT

## 2025-05-19 PROCEDURE — 0241U HB NFCT DS VIR RESP RNA 4 TRGT: CPT

## 2025-05-19 PROCEDURE — 70450 CT HEAD/BRAIN W/O DYE: CPT

## 2025-05-19 PROCEDURE — 94640 AIRWAY INHALATION TREATMENT: CPT

## 2025-05-19 PROCEDURE — 99285 EMERGENCY DEPT VISIT HI MDM: CPT

## 2025-05-19 PROCEDURE — 96365 THER/PROPH/DIAG IV INF INIT: CPT

## 2025-05-19 PROCEDURE — 87040 BLOOD CULTURE FOR BACTERIA: CPT

## 2025-05-19 PROCEDURE — 71275 CT ANGIOGRAPHY CHEST: CPT

## 2025-05-19 PROCEDURE — 93005 ELECTROCARDIOGRAM TRACING: CPT

## 2025-05-19 PROCEDURE — 84145 PROCALCITONIN (PCT): CPT

## 2025-05-19 PROCEDURE — 83605 ASSAY OF LACTIC ACID: CPT

## 2025-05-19 PROCEDURE — 96375 TX/PRO/DX INJ NEW DRUG ADDON: CPT

## 2025-05-19 PROCEDURE — 83880 ASSAY OF NATRIURETIC PEPTIDE: CPT

## 2025-05-19 PROCEDURE — 74177 CT ABD & PELVIS W/CONTRAST: CPT

## 2025-05-19 PROCEDURE — 36415 COLL VENOUS BLD VENIPUNCTURE: CPT

## 2025-05-19 RX ORDER — IPRATROPIUM BROMIDE AND ALBUTEROL SULFATE 2.5; .5 MG/3ML; MG/3ML
3 SOLUTION RESPIRATORY (INHALATION) ONCE
Status: COMPLETED | OUTPATIENT
Start: 2025-05-19 | End: 2025-05-19

## 2025-05-19 RX ORDER — GUAIFENESIN 600 MG/1
600 TABLET, EXTENDED RELEASE ORAL ONCE
Status: COMPLETED | OUTPATIENT
Start: 2025-05-19 | End: 2025-05-19

## 2025-05-19 RX ORDER — ACETAMINOPHEN 325 MG/1
650 TABLET ORAL ONCE
Status: DISCONTINUED | OUTPATIENT
Start: 2025-05-19 | End: 2025-05-20

## 2025-05-19 RX ORDER — METHYLPREDNISOLONE SODIUM SUCCINATE 125 MG/2ML
125 INJECTION, POWDER, LYOPHILIZED, FOR SOLUTION INTRAMUSCULAR; INTRAVENOUS ONCE
Status: COMPLETED | OUTPATIENT
Start: 2025-05-19 | End: 2025-05-19

## 2025-05-19 RX ADMIN — IPRATROPIUM BROMIDE AND ALBUTEROL SULFATE 3 ML: 2.5; .5 SOLUTION RESPIRATORY (INHALATION) at 21:27

## 2025-05-19 RX ADMIN — IOHEXOL 100 ML: 350 INJECTION, SOLUTION INTRAVENOUS at 22:11

## 2025-05-19 RX ADMIN — CEFTRIAXONE SODIUM 1000 MG: 10 INJECTION, POWDER, FOR SOLUTION INTRAVENOUS at 22:58

## 2025-05-19 RX ADMIN — METHYLPREDNISOLONE SODIUM SUCCINATE 125 MG: 125 INJECTION, POWDER, FOR SOLUTION INTRAMUSCULAR; INTRAVENOUS at 21:27

## 2025-05-19 RX ADMIN — AZITHROMYCIN MONOHYDRATE 500 MG: 500 INJECTION, POWDER, LYOPHILIZED, FOR SOLUTION INTRAVENOUS at 23:12

## 2025-05-19 RX ADMIN — GUAIFENESIN 600 MG: 600 TABLET ORAL at 21:27

## 2025-05-20 PROBLEM — J96.01 ACUTE RESPIRATORY FAILURE WITH HYPOXIA (HCC): Status: ACTIVE | Noted: 2025-05-20

## 2025-05-20 PROBLEM — A41.9 SEPSIS (HCC): Status: ACTIVE | Noted: 2025-05-20

## 2025-05-20 PROBLEM — J18.9 PNEUMONIA: Status: ACTIVE | Noted: 2025-05-20

## 2025-05-20 PROBLEM — J44.9 COPD (CHRONIC OBSTRUCTIVE PULMONARY DISEASE) (HCC): Status: ACTIVE | Noted: 2025-05-20

## 2025-05-20 PROBLEM — Z86.73 HISTORY OF STROKE: Status: ACTIVE | Noted: 2025-05-20

## 2025-05-20 PROBLEM — J98.4 RESTRICTIVE LUNG DISEASE: Status: ACTIVE | Noted: 2019-08-08

## 2025-05-20 PROBLEM — E78.5 HYPERLIPIDEMIA: Status: ACTIVE | Noted: 2020-10-02

## 2025-05-20 PROBLEM — Z95.1 PRESENCE OF AORTOCORONARY BYPASS GRAFT: Status: ACTIVE | Noted: 2018-01-10

## 2025-05-20 PROBLEM — Z86.73 HISTORY OF TRANSIENT ISCHEMIC ATTACK (TIA): Status: ACTIVE | Noted: 2020-10-02

## 2025-05-20 PROBLEM — F41.1 GENERALIZED ANXIETY DISORDER: Status: ACTIVE | Noted: 2018-01-10

## 2025-05-20 PROBLEM — G93.40 ENCEPHALOPATHY: Status: ACTIVE | Noted: 2025-05-20

## 2025-05-20 PROBLEM — E11.9 TYPE 2 DIABETES MELLITUS (HCC): Status: ACTIVE | Noted: 2018-01-10

## 2025-05-20 PROBLEM — K52.9 ENTERITIS: Status: ACTIVE | Noted: 2025-05-20

## 2025-05-20 PROBLEM — J45.30 MILD PERSISTENT ASTHMA WITHOUT COMPLICATION: Status: ACTIVE | Noted: 2018-01-10

## 2025-05-20 PROBLEM — I25.10 CAD IN NATIVE ARTERY: Status: ACTIVE | Noted: 2020-06-17

## 2025-05-20 LAB
4HR DELTA HS TROPONIN: -8 NG/L
ANION GAP SERPL CALCULATED.3IONS-SCNC: 8 MMOL/L (ref 4–13)
ATRIAL RATE: 101 BPM
BUN SERPL-MCNC: 18 MG/DL (ref 5–25)
CALCIUM SERPL-MCNC: 9.1 MG/DL (ref 8.4–10.2)
CARDIAC TROPONIN I PNL SERPL HS: 11 NG/L (ref ?–50)
CHLORIDE SERPL-SCNC: 98 MMOL/L (ref 96–108)
CO2 SERPL-SCNC: 28 MMOL/L (ref 21–32)
CREAT SERPL-MCNC: 1.03 MG/DL (ref 0.6–1.3)
ERYTHROCYTE [DISTWIDTH] IN BLOOD BY AUTOMATED COUNT: 12.6 % (ref 11.6–15.1)
GFR SERPL CREATININE-BSD FRML MDRD: 54 ML/MIN/1.73SQ M
GLUCOSE SERPL-MCNC: 136 MG/DL (ref 65–140)
GLUCOSE SERPL-MCNC: 147 MG/DL (ref 65–140)
GLUCOSE SERPL-MCNC: 167 MG/DL (ref 65–140)
GLUCOSE SERPL-MCNC: 168 MG/DL (ref 65–140)
GLUCOSE SERPL-MCNC: 176 MG/DL (ref 65–140)
GLUCOSE SERPL-MCNC: 182 MG/DL (ref 65–140)
HCT VFR BLD AUTO: 41.3 % (ref 34.8–46.1)
HGB BLD-MCNC: 13.4 G/DL (ref 11.5–15.4)
L PNEUMO1 AG UR QL IA.RAPID: NEGATIVE
MAGNESIUM SERPL-MCNC: 2.1 MG/DL (ref 1.9–2.7)
MCH RBC QN AUTO: 30.7 PG (ref 26.8–34.3)
MCHC RBC AUTO-ENTMCNC: 32.4 G/DL (ref 31.4–37.4)
MCV RBC AUTO: 95 FL (ref 82–98)
P AXIS: 78 DEGREES
PHOSPHATE SERPL-MCNC: 3.4 MG/DL (ref 2.3–4.1)
PLATELET # BLD AUTO: 211 THOUSANDS/UL (ref 149–390)
PMV BLD AUTO: 9.5 FL (ref 8.9–12.7)
POTASSIUM SERPL-SCNC: 4.2 MMOL/L (ref 3.5–5.3)
PR INTERVAL: 154 MS
PROCALCITONIN SERPL-MCNC: 0.17 NG/ML
QRS AXIS: 33 DEGREES
QRSD INTERVAL: 84 MS
QT INTERVAL: 350 MS
QTC INTERVAL: 453 MS
RBC # BLD AUTO: 4.36 MILLION/UL (ref 3.81–5.12)
S PNEUM AG UR QL: NEGATIVE
SODIUM SERPL-SCNC: 134 MMOL/L (ref 135–147)
T WAVE AXIS: 130 DEGREES
VENTRICULAR RATE: 101 BPM
WBC # BLD AUTO: 13.2 THOUSAND/UL (ref 4.31–10.16)

## 2025-05-20 PROCEDURE — 84484 ASSAY OF TROPONIN QUANT: CPT

## 2025-05-20 PROCEDURE — 87070 CULTURE OTHR SPECIMN AEROBIC: CPT

## 2025-05-20 PROCEDURE — 84100 ASSAY OF PHOSPHORUS: CPT

## 2025-05-20 PROCEDURE — 94760 N-INVAS EAR/PLS OXIMETRY 1: CPT

## 2025-05-20 PROCEDURE — 94640 AIRWAY INHALATION TREATMENT: CPT

## 2025-05-20 PROCEDURE — 85027 COMPLETE CBC AUTOMATED: CPT

## 2025-05-20 PROCEDURE — 83735 ASSAY OF MAGNESIUM: CPT

## 2025-05-20 PROCEDURE — 80048 BASIC METABOLIC PNL TOTAL CA: CPT

## 2025-05-20 PROCEDURE — 99223 1ST HOSP IP/OBS HIGH 75: CPT | Performed by: FAMILY MEDICINE

## 2025-05-20 PROCEDURE — 82948 REAGENT STRIP/BLOOD GLUCOSE: CPT

## 2025-05-20 PROCEDURE — 87205 SMEAR GRAM STAIN: CPT

## 2025-05-20 PROCEDURE — 87449 NOS EACH ORGANISM AG IA: CPT

## 2025-05-20 PROCEDURE — 94664 DEMO&/EVAL PT USE INHALER: CPT

## 2025-05-20 PROCEDURE — 93010 ELECTROCARDIOGRAM REPORT: CPT | Performed by: INTERNAL MEDICINE

## 2025-05-20 RX ORDER — ASPIRIN 81 MG/1
81 TABLET, CHEWABLE ORAL DAILY
Status: DISCONTINUED | OUTPATIENT
Start: 2025-05-20 | End: 2025-05-24 | Stop reason: HOSPADM

## 2025-05-20 RX ORDER — INSULIN LISPRO 100 [IU]/ML
1-6 INJECTION, SOLUTION INTRAVENOUS; SUBCUTANEOUS
Status: DISCONTINUED | OUTPATIENT
Start: 2025-05-20 | End: 2025-05-24 | Stop reason: HOSPADM

## 2025-05-20 RX ORDER — NITROGLYCERIN 0.4 MG/1
0.4 TABLET SUBLINGUAL
Status: DISCONTINUED | OUTPATIENT
Start: 2025-05-20 | End: 2025-05-24 | Stop reason: HOSPADM

## 2025-05-20 RX ORDER — IPRATROPIUM BROMIDE AND ALBUTEROL SULFATE 2.5; .5 MG/3ML; MG/3ML
3 SOLUTION RESPIRATORY (INHALATION) EVERY 6 HOURS PRN
Status: DISCONTINUED | OUTPATIENT
Start: 2025-05-20 | End: 2025-05-24 | Stop reason: HOSPADM

## 2025-05-20 RX ORDER — ENOXAPARIN SODIUM 100 MG/ML
40 INJECTION SUBCUTANEOUS DAILY
Status: DISCONTINUED | OUTPATIENT
Start: 2025-05-20 | End: 2025-05-20

## 2025-05-20 RX ORDER — ACETAMINOPHEN 325 MG/1
650 TABLET ORAL EVERY 6 HOURS PRN
Status: DISCONTINUED | OUTPATIENT
Start: 2025-05-20 | End: 2025-05-24 | Stop reason: HOSPADM

## 2025-05-20 RX ORDER — METHYLPREDNISOLONE SODIUM SUCCINATE 40 MG/ML
40 INJECTION, POWDER, LYOPHILIZED, FOR SOLUTION INTRAMUSCULAR; INTRAVENOUS EVERY 8 HOURS SCHEDULED
Status: DISCONTINUED | OUTPATIENT
Start: 2025-05-20 | End: 2025-05-22

## 2025-05-20 RX ORDER — METOPROLOL SUCCINATE 50 MG/1
50 TABLET, EXTENDED RELEASE ORAL DAILY
Status: DISCONTINUED | OUTPATIENT
Start: 2025-05-20 | End: 2025-05-24 | Stop reason: HOSPADM

## 2025-05-20 RX ORDER — LOSARTAN POTASSIUM 50 MG/1
50 TABLET ORAL DAILY
Status: DISCONTINUED | OUTPATIENT
Start: 2025-05-20 | End: 2025-05-24 | Stop reason: HOSPADM

## 2025-05-20 RX ORDER — AMLODIPINE BESYLATE 5 MG/1
5 TABLET ORAL DAILY
Status: DISCONTINUED | OUTPATIENT
Start: 2025-05-20 | End: 2025-05-24 | Stop reason: HOSPADM

## 2025-05-20 RX ORDER — MONTELUKAST SODIUM 10 MG/1
10 TABLET ORAL DAILY
Status: DISCONTINUED | OUTPATIENT
Start: 2025-05-20 | End: 2025-05-24 | Stop reason: HOSPADM

## 2025-05-20 RX ORDER — IPRATROPIUM BROMIDE AND ALBUTEROL SULFATE 2.5; .5 MG/3ML; MG/3ML
3 SOLUTION RESPIRATORY (INHALATION) ONCE
Status: COMPLETED | OUTPATIENT
Start: 2025-05-20 | End: 2025-05-20

## 2025-05-20 RX ORDER — ATORVASTATIN CALCIUM 40 MG/1
80 TABLET, FILM COATED ORAL
Status: DISCONTINUED | OUTPATIENT
Start: 2025-05-20 | End: 2025-05-24 | Stop reason: HOSPADM

## 2025-05-20 RX ORDER — ALPRAZOLAM 0.25 MG
0.25 TABLET ORAL DAILY PRN
Status: DISCONTINUED | OUTPATIENT
Start: 2025-05-20 | End: 2025-05-24 | Stop reason: HOSPADM

## 2025-05-20 RX ORDER — GUAIFENESIN 100 MG/5ML
100 SOLUTION ORAL EVERY 4 HOURS PRN
Status: DISCONTINUED | OUTPATIENT
Start: 2025-05-20 | End: 2025-05-22

## 2025-05-20 RX ORDER — ENOXAPARIN SODIUM 100 MG/ML
40 INJECTION SUBCUTANEOUS EVERY 12 HOURS SCHEDULED
Status: DISCONTINUED | OUTPATIENT
Start: 2025-05-20 | End: 2025-05-24 | Stop reason: HOSPADM

## 2025-05-20 RX ORDER — ALBUTEROL SULFATE 90 UG/1
1 INHALANT RESPIRATORY (INHALATION) EVERY 6 HOURS PRN
Status: DISCONTINUED | OUTPATIENT
Start: 2025-05-20 | End: 2025-05-24 | Stop reason: HOSPADM

## 2025-05-20 RX ORDER — ONDANSETRON 2 MG/ML
4 INJECTION INTRAMUSCULAR; INTRAVENOUS EVERY 6 HOURS PRN
Status: DISCONTINUED | OUTPATIENT
Start: 2025-05-20 | End: 2025-05-24 | Stop reason: HOSPADM

## 2025-05-20 RX ORDER — ACETAMINOPHEN 325 MG/1
650 TABLET ORAL EVERY 6 HOURS PRN
Status: DISCONTINUED | OUTPATIENT
Start: 2025-05-20 | End: 2025-05-20

## 2025-05-20 RX ORDER — CLOPIDOGREL BISULFATE 75 MG/1
75 TABLET ORAL DAILY
Status: DISCONTINUED | OUTPATIENT
Start: 2025-05-20 | End: 2025-05-24 | Stop reason: HOSPADM

## 2025-05-20 RX ADMIN — ALBUTEROL SULFATE 1 PUFF: 90 AEROSOL, METERED RESPIRATORY (INHALATION) at 08:41

## 2025-05-20 RX ADMIN — INSULIN LISPRO 1 UNITS: 100 INJECTION, SOLUTION INTRAVENOUS; SUBCUTANEOUS at 11:46

## 2025-05-20 RX ADMIN — METHYLPREDNISOLONE SODIUM SUCCINATE 40 MG: 40 INJECTION, POWDER, FOR SOLUTION INTRAMUSCULAR; INTRAVENOUS at 14:11

## 2025-05-20 RX ADMIN — ASPIRIN 81 MG 81 MG: 81 TABLET ORAL at 08:41

## 2025-05-20 RX ADMIN — ATORVASTATIN CALCIUM 80 MG: 40 TABLET, FILM COATED ORAL at 16:33

## 2025-05-20 RX ADMIN — AMLODIPINE BESYLATE 5 MG: 5 TABLET ORAL at 08:41

## 2025-05-20 RX ADMIN — IPRATROPIUM BROMIDE AND ALBUTEROL SULFATE 3 ML: 2.5; .5 SOLUTION RESPIRATORY (INHALATION) at 20:26

## 2025-05-20 RX ADMIN — GUAIFENESIN 100 MG: 200 SOLUTION ORAL at 10:26

## 2025-05-20 RX ADMIN — METHYLPREDNISOLONE SODIUM SUCCINATE 40 MG: 40 INJECTION, POWDER, FOR SOLUTION INTRAMUSCULAR; INTRAVENOUS at 22:52

## 2025-05-20 RX ADMIN — METHYLPREDNISOLONE SODIUM SUCCINATE 40 MG: 40 INJECTION, POWDER, FOR SOLUTION INTRAMUSCULAR; INTRAVENOUS at 06:18

## 2025-05-20 RX ADMIN — INSULIN LISPRO 1 UNITS: 100 INJECTION, SOLUTION INTRAVENOUS; SUBCUTANEOUS at 08:41

## 2025-05-20 RX ADMIN — CEFTRIAXONE SODIUM 1000 MG: 10 INJECTION, POWDER, FOR SOLUTION INTRAVENOUS at 23:39

## 2025-05-20 RX ADMIN — CLOPIDOGREL 75 MG: 75 TABLET ORAL at 08:41

## 2025-05-20 RX ADMIN — ENOXAPARIN SODIUM 40 MG: 40 INJECTION SUBCUTANEOUS at 21:40

## 2025-05-20 RX ADMIN — ENOXAPARIN SODIUM 40 MG: 40 INJECTION SUBCUTANEOUS at 08:40

## 2025-05-20 RX ADMIN — IPRATROPIUM BROMIDE AND ALBUTEROL SULFATE 3 ML: 2.5; .5 SOLUTION RESPIRATORY (INHALATION) at 00:27

## 2025-05-20 RX ADMIN — METOPROLOL SUCCINATE 50 MG: 50 TABLET, EXTENDED RELEASE ORAL at 08:41

## 2025-05-20 RX ADMIN — LOSARTAN POTASSIUM 50 MG: 50 TABLET, FILM COATED ORAL at 08:41

## 2025-05-20 RX ADMIN — MONTELUKAST 10 MG: 10 TABLET, FILM COATED ORAL at 08:41

## 2025-05-20 NOTE — SEPSIS NOTE
Sepsis Note   Cristine Snell 69 y.o. female MRN: [id number]  Unit/Bed#: -01 Encounter: [id number]       Initial Sepsis Screening       Row Name 05/20/25 0142                Is the patient's history suggestive of a new or worsening infection? Yes (Proceed)  -MZ        Suspected source of infection pneumonia  -MZ        Indicate SIRS criteria Leukocytosis (WBC > 26155 IJL) OR Leukopenia (WBC <4000 IJL) OR Bandemia (WBC >10% bands);Tachycardia > 90 bpm  -MZ        Are two or more of the above signs & symptoms of infection both present and new to the patient? Yes (Proceed)  -MZ        Assess for evidence of organ dysfunction: Are any of the below criteria present within 6 hours of suspected infection and SIRS criteria that are NOT considered to be chronic conditions? --                  User Key  (r) = Recorded By, (t) = Taken By, (c) = Cosigned By      Initials Name Provider Type    JAZIEL Reyes PA-C Physician Assistant                   Initial Sepsis Screening       Row Name 05/20/25 0142                   Initial Sepsis Assessment    Is the patient's history suggestive of a new or worsening infection? Yes (Proceed)  -MZ        Suspected source of infection pneumonia  -MZ        Indicate SIRS criteria Leukocytosis (WBC > 98588 IJL) OR Leukopenia (WBC <4000 IJL) OR Bandemia (WBC >10% bands);Tachycardia > 90 bpm  -MZ        Are two or more of the above signs & symptoms of infection both present and new to the patient? Yes (Proceed)  -MZ                  User Key  (r) = Recorded By, (t) = Taken By, (c) = Cosigned By      Initials Name Provider Type    JAZIEL Reyes PA-C Physician Assistant                         Body mass index is 44.57 kg/m².  Wt Readings from Last 1 Encounters:   05/20/25 100 kg (220 lb 10.9 oz)     IBW (Ideal Body Weight): 43.2 kg    Ideal body weight: 48.8 kg (107 lb 8.4 oz)  Adjusted ideal body weight: 69.3 kg (152 lb 12.6 oz)

## 2025-05-20 NOTE — ASSESSMENT & PLAN NOTE
BP stable since admission, continue to monitor per unit protocol  Continue PTA amlodipine, losartan, metoprolol

## 2025-05-20 NOTE — ASSESSMENT & PLAN NOTE
Lab Results   Component Value Date    HGBA1C 6.5 (H) 03/10/2025   Typically diet controlled  SSI AC/at bedtime ordered  Monitor Accu-Cheks closely, hypoglycemia protocol

## 2025-05-20 NOTE — ASSESSMENT & PLAN NOTE
C/c : Reports ongoing shortness of breath/productive cough for 1 week.  Denies any chest pain/fevers.  Does report exposure to son-in-law with COVID.    CT PE abdomen/pelvis : Mild scattered pulmonary emphysematous changes. Alveolar opacities in the right middle and lower lobes. Scattered reticulonodular opacities are seen in the bilateral lungs, as described, suspicious for multifocal infection.   Continue IV ceftriaxone(day2)  Urine streptococcal antigen/Legionella negative  Sputum cultures gm + cocci in pairs/chains  Blood cultures pending  Flu RSV COVID-negative.

## 2025-05-20 NOTE — PLAN OF CARE
Problem: PAIN - ADULT  Goal: Verbalizes/displays adequate comfort level or baseline comfort level  Description: Interventions:  - Encourage patient to monitor pain and request assistance  - Assess pain using appropriate pain scale  - Administer analgesics as ordered based on type and severity of pain and evaluate response  - Implement non-pharmacological measures as appropriate and evaluate response  - Consider cultural and social influences on pain and pain management  - Notify physician/advanced practitioner if interventions unsuccessful or patient reports new pain  - Educate patient/family on pain management process including their role and importance of  reporting pain   - Provide non-pharmacologic/complimentary pain relief interventions  Outcome: Progressing     Problem: INFECTION - ADULT  Goal: Absence or prevention of progression during hospitalization  Description: INTERVENTIONS:  - Assess and monitor for signs and symptoms of infection  - Monitor lab/diagnostic results  - Monitor all insertion sites, i.e. indwelling lines, tubes, and drains  - Monitor endotracheal if appropriate and nasal secretions for changes in amount and color  - [place] appropriate cooling/warming therapies per order  - Administer medications as ordered  - Instruct and encourage patient and family to use good hand hygiene technique  - Identify and instruct in appropriate isolation precautions for identified infection/condition  Outcome: Progressing  Goal: Absence of fever/infection during neutropenic period  Description: INTERVENTIONS:  - Monitor WBC  - Perform strict hand hygiene  - Limit to healthy visitors only  - No plants, dried, fresh or silk flowers with butler in patient room  Outcome: Progressing

## 2025-05-20 NOTE — H&P
H&P - Hospitalist   Name: Cristine Snell 69 y.o. female I MRN: [id number]  Unit/Bed#: -01 I Date of Admission: 5/19/2025   Date of Service: 5/20/2025 I Hospital Day: 0     Assessment & Plan  Sepsis (HCC)  As evidenced by tachycardia, leukocytosis 13.51 + pneumonia and enteritis  CT PE abdomen/pelvis revealing scattered opacities in b/l lungs suspicious for multifocal infection and liquid stool in colon possibly related to mild enteritis  Procalcitonin, lactic acid, COVID/flu negative  O2 sat 90% on 4.5 L NC  IVF bolus deferred as per ED patient with hx of CHF. Will defer continuous IVF hydration to avoid fluid overload   Received IV ceftriaxone/azithromycin and Solu-Medrol in ED, continue abx  Blood/sputum cultures and strep/legionella urinary antigen pending, plan to follow-up  Trend leukocytosis with daily CBC  IS hourly, respiratory protocol, aspiration precautions  Pneumonia  69-year-old female with PMH COPD presents to ED C/L cough, SOB and diarrhea x 1 week  CT PE abdomen/pelvis revealing scattered opacities in b/l lungs suspicious for multifocal infection and liquid stool in colon possibly related to mild enteritis  See rest of plan above  Acute respiratory failure with hypoxia (HCC)  Per ED report, patient was hypoxic to mid 80s on room air  Currently SpO2: 90 % on Nasal Cannula O2 Flow Rate (L/min): 4.5 L/min  Continue NC as needed to maintain O2 sat greater than 90%  Enteritis  Complaining of watery, nonbloody diarrhea x 1 week  Denies recent antibiotic use, no recent travel  CT a/p revealing liquid stool in colon possibly related to mild enteritis  Clear liquid diet, advance as tolerated  Consider GI consult if diarrhea does not resolve  Encephalopathy  Per ED report, family states patient was confused earlier today, however has resolved by time of presentation to ED. Family denies history of confusion/sundowning in patient  CTh negative for acute abnormality  Patient is AAO x 4  Delirium precautions  initiated  HTN (hypertension)  BP stable since admission, continue to monitor per unit protocol  Continue PTA amlodipine, losartan, metoprolol  Generalized anxiety disorder  Continue PTA Xanax as needed  CAD in native artery  Per chart review patient is s/p CABG with stenting x 3  Follows with cardiology as outpatient  EKG revealing sinus tachycardia  bpm  Patient denies CP  Continue PTA ASA, atorvastatin, Plavix, losartan, metoprolol and SL nitro as needed  STAT EKG if patient develops CP  Type 2 diabetes mellitus (HCC)  Lab Results   Component Value Date    HGBA1C 6.5 (H) 03/10/2025   SSI AC/at bedtime ordered  Monitor Accu-Cheks closely, hypoglycemia protocol  Hyperlipidemia  Continue PTA atorvastatin  History of stroke  Per chart review patient with history of CVA x 3  Continue PTA ASA/Plavix  COPD (chronic obstructive pulmonary disease) (HCC)  Diffuse wheezing in all lung fields noted on auscultation  40 mg IV Solu-Medrol 3 times daily  Continue PTA albuterol neb as needed    VTE Pharmacologic Prophylaxis: VTE Score: 6 High Risk (Score >/= 5) - Pharmacological DVT Prophylaxis Ordered: enoxaparin (Lovenox). Sequential Compression Devices Ordered.  Code Status: Level 1 - Full Code   Discussion with family: Patient declined call to .     Anticipated Length of Stay: Patient will be admitted on an inpatient basis with an anticipated length of stay of greater than 2 midnights secondary to IV abx 2/2 sepsis pna.    History of Present Illness   Chief Complaint:   Chief Complaint   Patient presents with    Flu Symptoms     Pt c/o SOB, chest pain, cough, body aches, and diarrhea x 1 week. Pt has been around covid.    Chest Pain    Shortness of Breath      Cristine Snell is a 69 y.o. female with a PMH of HTN, JANE, CAD s/p CABG, asthma, T2DM, HLD, history of stroke who presents with SOB, cough and diarrhea x1 week.  Upon arrival to ED patient was hypoxic to mid 80s on room air.  Patient states she is on  room air at home.  She is now satting appropriately on 4 L NC.  Denies CP, nausea, vomiting, blood in stool.  Denies recent travel or recent antibiotic use.  Admits to exposure to COVID on Sunday.    Review of Systems   Constitutional:  Negative for chills and fever.   HENT:  Positive for rhinorrhea. Negative for sore throat.    Respiratory:  Positive for cough and shortness of breath.    Cardiovascular:  Negative for chest pain, palpitations and leg swelling.   Gastrointestinal:  Positive for diarrhea. Negative for abdominal pain, blood in stool, constipation, nausea and vomiting.   Neurological:  Negative for dizziness, syncope and headaches.   Psychiatric/Behavioral:  Negative for agitation, behavioral problems and confusion.        Historical Information   Past Medical History:   Diagnosis Date    Angina at rest (HCC)     Arthritis .    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     Past Surgical History:   Procedure Laterality Date    ANGIOPLASTY      CHOLECYSTECTOMY      CORONARY ARTERY BYPASS GRAFT      CORONARY STENT PLACEMENT      x3    GALLBLADDER SURGERY      HYSTERECTOMY      KNEE SURGERY Right     OTHER SURGICAL HISTORY      Double Bypass      Social History     Tobacco Use    Smoking status: Former    Smokeless tobacco: Never   Vaping Use    Vaping status: Never Used   Substance and Sexual Activity    Alcohol use: Not Currently    Drug use: No    Sexual activity: Not Currently     Birth control/protection: Surgical     Comment: MARTIR-BSO     E-Cigarette/Vaping    E-Cigarette Use Never User      E-Cigarette/Vaping Substances     Family History   Problem Relation Age of Onset    Breast cancer Mother 52    Colon cancer Father     Breast cancer Maternal Aunt      Ovarian cancer Maternal Aunt     Breast cancer Maternal Aunt     Cervical cancer Maternal Aunt     Uterine cancer Maternal Aunt     No Known Problems Sister     No Known Problems Daughter     No Known Problems Sister     No Known Problems Sister      Social History:  Marital Status: Single   Occupation:   Patient Pre-hospital Living Situation: Home  Patient Pre-hospital Level of Mobility: walks  Patient Pre-hospital Diet Restrictions:     Meds/Allergies   I have reviewed home medications using recent Epic encounter.  Prior to Admission medications    Medication Sig Start Date End Date Taking? Authorizing Provider   albuterol (PROVENTIL HFA,VENTOLIN HFA) 90 mcg/act inhaler  6/6/24  Yes Historical Provider, MD   ALPRAZolam (XANAX) 0.25 mg tablet Take 0.25 mg by mouth daily as needed for anxiety 6/19/24  Yes Historical Provider, MD   amLODIPine (NORVASC) 5 mg tablet  3/17/22  Yes Historical Provider, MD   aspirin 81 MG tablet Take by mouth   Yes Historical Provider, MD   atorvastatin (LIPITOR) 80 mg tablet Take 80 mg by mouth 7/31/13  Yes Historical Provider, MD   Blood Glucose Monitoring Suppl (ONE TOUCH ULTRA 2) w/Device KIT    Yes Historical Provider, MD   calcium-vitamin D 250-100 MG-UNIT per tablet Take 1 tablet by mouth in the morning and 1 tablet in the evening.   Yes Historical Provider, MD   clopidogrel (PLAVIX) 75 mg tablet  11/21/17  Yes Historical Provider, MD   cyanocobalamin (VITAMIN B-12) 100 mcg tablet Take 1 tablet by mouth   Yes Historical Provider, MD   glucose blood test strip    Yes Historical Provider, MD   guaiFENesin (ROBITUSSIN) 100 MG/5ML oral liquid Take 100 mg by mouth as needed in the morning and 100 mg as needed at noon and 100 mg as needed in the evening. prm.   Yes Historical Provider, MD   ipratropium-albuterol (DUO-NEB) 0.5-2.5 mg/3 mL nebulizer solution Take 3 mL by nebulization every 6 (six) hours as needed for wheezing or shortness of breath 12/1/23  Yes Jesus Kelly PA-C    Lancets (OneTouch Delica Plus [id number]) MISC    Yes Historical Provider, MD   losartan (COZAAR) 50 mg tablet  8/16/22  Yes Historical Provider, MD   metoprolol succinate (TOPROL-XL) 50 mg 24 hr tablet  10/18/22  Yes Historical Provider, MD   montelukast (SINGULAIR) 10 mg tablet  4/11/23  Yes Historical Provider, MD   [place]-3 1000 MG CAPS Take by mouth   Yes Historical Provider, MD   zolpidem (AMBIEN) 10 mg tablet Take 10 mg by mouth in the morning. 7/31/13  Yes Historical Provider, MD   acetaminophen (TYLENOL) 325 mg tablet every 6 (six) hours  Patient not taking: Reported on 5/20/2025    Historical Provider, MD   isosorbide mononitrate (IMDUR) 30 mg 24 hr tablet Take 1 tablet (30 mg total) by mouth daily  Patient not taking: Reported on 5/20/2025 11/7/22   Elsie Schwartz PA-C   methylPREDNISolone 4 MG tablet therapy pack 4 mg  Patient not taking: Reported on 6/28/2024    Historical Provider, MD   nitroglycerin (NITROSTAT) 0.4 mg SL tablet Take 1 tablet by mouth in the morning and 1 tablet at noon and 1 tablet in the evening.    Historical Provider, MD   nystatin (MYCOSTATIN) cream  9/8/22   Historical Provider, MD   pantoprazole (PROTONIX) 40 mg tablet TAKE ONE TABLET BY MOUTH ONCE DAILY  Patient not taking: Reported on 5/20/2025 8/18/23   Sammie Ross PA-C   pantoprazole (PROTONIX) 40 mg tablet TAKE 1 TABLET BY MOUTH 2 TIMES A DAY BEFORE MEALS.  Patient not taking: Reported on 5/20/2025 12/23/24   Yasmany Cortez, DO     No Known Allergies    Objective :  Temp:  [98.4 °F (36.9 °C)-98.5 °F (36.9 °C)] 98.4 °F (36.9 °C)  HR:  [90-98] 90  BP: (141-164)/(69-85) 154/72  Resp:  [20-21] 21  SpO2:  [84 %-92 %] 90 %  O2 Device: Nasal cannula  Nasal Cannula O2 Flow Rate (L/min):  [4 L/min] 4 L/min    Physical Exam  Vitals reviewed.   Constitutional:       General: She is not in acute distress.     Appearance: She is not ill-appearing, toxic-appearing or diaphoretic.     Eyes:      Extraocular Movements: Extraocular  movements intact.      Pupils: Pupils are equal, round, and reactive to light.       Cardiovascular:      Rate and Rhythm: Normal rate and regular rhythm.   Pulmonary:      Effort: Pulmonary effort is normal.      Breath sounds: Wheezing present. No rhonchi or rales.   Abdominal:      General: There is no distension.      Palpations: Abdomen is soft.      Tenderness: There is no abdominal tenderness. There is no guarding.     Musculoskeletal:         General: No swelling.      Right lower leg: No edema.      Left lower leg: No edema.     Skin:     General: Skin is warm and dry.     Neurological:      Mental Status: She is alert and oriented to person, place, and time.         Lab Results: I have reviewed the following results:  Results from last 7 days   Lab Units 05/19/25  2113   WBC Thousand/uL 13.51*   HEMOGLOBIN g/dL 12.8   HEMATOCRIT % 41.6   PLATELETS Thousands/uL 227   LYMPHO PCT % 16   MONO PCT % 11   EOS PCT % 0     Results from last 7 days   Lab Units 05/19/25  2113   SODIUM mmol/L 133*   POTASSIUM mmol/L 3.6   CHLORIDE mmol/L 96   CO2 mmol/L 27   BUN mg/dL 15   CREATININE mg/dL 0.97   ANION GAP mmol/L 10   CALCIUM mg/dL 9.0   ALBUMIN g/dL 4.0   TOTAL BILIRUBIN mg/dL 0.67   ALK PHOS U/L 67   ALT U/L 16   AST U/L 29   GLUCOSE RANDOM mg/dL 124             Lab Results   Component Value Date    HGBA1C 6.5 (H) 03/10/2025    HGBA1C 6.6 (H) 08/13/2024    HGBA1C 6.4 (H) 02/15/2024     Results from last 7 days   Lab Units 05/19/25  2232   LACTIC ACID mmol/L 1.0       Imaging Results Review: I reviewed radiology reports from this admission including: CT PE w a/p, chest xray, and CT head.  Other Study Results Review: EKG was personally reviewed and my interpretation is: Sinus Tachycardia.  bpm..    Administrative Statements   I have spent a total time of 60 minutes in caring for this patient on the day of the visit/encounter including Diagnostic results, Risks and benefits of tx options, Instructions for  management, Patient and family education, Importance of tx compliance, Risk factor reductions, Impressions, Counseling / Coordination of care, Documenting in the medical record, Reviewing/placing orders in the medical record (including tests, medications, and/or procedures), Obtaining or reviewing history  , and Communicating with other healthcare professionals .    ** Please Note: This note has been constructed using a voice recognition system. **

## 2025-05-20 NOTE — ASSESSMENT & PLAN NOTE
Diffuse wheezing in all lung fields noted on auscultation  40 mg IV Solu-Medrol 3 times daily  Continue PTA albuterol neb as needed

## 2025-05-20 NOTE — ASSESSMENT & PLAN NOTE
Per ED report, family stateed patient was confused earlier on day of admission,  however has resolved by time of presentation to ED. Family denies history of confusion/sundowning in patient  CTh negative for acute abnormality  Patient is AAO x 4  Delirium precautions initiated

## 2025-05-20 NOTE — ASSESSMENT & PLAN NOTE
H/o CABG with stenting x 3  EKG revealing sinus tachycardia  bpm  Patient denies CP  Continue home ASA, atorvastatin, Plavix, losartan, metoprolol and SL nitro as needed

## 2025-05-20 NOTE — ASSESSMENT & PLAN NOTE
Diffuse wheezing in all lung fields noted on auscultation    Cw 40 mg IV Solu-Medrol 3 times daily  Continue duoneb q6hrs  Cw prn guaifenesin

## 2025-05-20 NOTE — ASSESSMENT & PLAN NOTE
69-year-old female with PMH COPD presents to ED C/L cough, SOB and diarrhea x 1 week  CT PE abdomen/pelvis revealing scattered opacities in b/l lungs suspicious for multifocal infection and liquid stool in colon possibly related to mild enteritis  See rest of plan above

## 2025-05-20 NOTE — ED PROVIDER NOTES
Time reflects when diagnosis was documented in both MDM as applicable and the Disposition within this note       Time User Action Codes Description Comment    5/20/2025 12:08 AM Courtney Cavazos [J18.9] Pneumonia     5/20/2025 12:08 AM Courtney Cavazos [K52.9] Enteritis     5/20/2025 12:08 AM Courtney Cavazos [J44.1] COPD exacerbation (Formerly [facility])     5/20/2025 12:08 AM Courtney Cavazos [I50.9] Acute CHF (congestive heart failure) (Formerly [facility])     5/20/2025 12:08 AM Courtney Cavazos Add [A41.9] Sepsis (Formerly [facility])           ED Disposition       ED Disposition   Admit    Condition   Stable    Date/Time   Tue May 20, 2025 12:08 AM    Comment   Case was discussed with Dr. Yeboah and the patient's admission status was agreed to be Admission Status: inpatient status to the service of Dr. Yeboah .               Assessment & Plan       Medical Decision Making  Hypoxia to low 80s on room air on arrival, no history of O2 requirement.  Improvement after 4L NC. HR >90 bpms. Otherwise, VSS. Leukocytosis, 13.51. Cardiac work-up stable. No acute findings per CT head imaging. Elevated d-dimer, no PE per CT. CT PE A/P concerning for pneumonia and enteritis. Pending blood cx. Started IV fluids, abx in the ER.  Discussed ER results with patient and family at bedside.  Discussed with internal medicine for admission.  Patient understands and consents to admission.    Amount and/or Complexity of Data Reviewed  Labs: ordered. Decision-making details documented in ED Course.  Radiology: ordered.    Risk  OTC drugs.  Prescription drug management.  Decision regarding hospitalization.        ED Course as of 05/20/25 0021   Mon May 19, 2025   2130 WBC(!): 13.51   2130 Pulse: 98   2138 D-Dimer, Quant(!): 0.89   2350 Delta 2hr hsTnI: -1       Medications   acetaminophen (TYLENOL) tablet 650 mg (650 mg Oral Not Given 5/19/25 2136)   ipratropium-albuterol (DUO-NEB) 0.5-2.5 mg/3 mL inhalation solution 3 mL (has no administration in time range)   guaiFENesin  (MUCINEX) 12 hr tablet 600 mg (600 mg Oral Given 5/19/25 2127)   methylPREDNISolone sodium succinate (Solu-MEDROL) injection 125 mg (125 mg Intravenous Given 5/19/25 2127)   ipratropium-albuterol (DUO-NEB) 0.5-2.5 mg/3 mL inhalation solution 3 mL (3 mL Nebulization Given 5/19/25 2127)   iohexol (OMNIPAQUE) 350 MG/ML injection (MULTI-DOSE) 100 mL (100 mL Intravenous Given 5/19/25 2211)   ceftriaxone (ROCEPHIN) 1 g/50 mL in dextrose IVPB (0 mg Intravenous Stopped 5/19/25 2328)   azithromycin (ZITHROMAX) 500 mg in sodium chloride 0.9% 250mL IVPB 500 mg (500 mg Intravenous New Bag 5/19/25 2312)       ED Risk Strat Scores   HEART Risk Score      Flowsheet Row Most Recent Value   Heart Score Risk Calculator    History 0 Filed at: 05/19/2025 2205   ECG 1 Filed at: 05/19/2025 2205   Age 2 Filed at: 05/19/2025 2205   Risk Factors 2 Filed at: 05/19/2025 2205   Troponin 1 Filed at: 05/19/2025 2205   HEART Score 6 Filed at: 05/19/2025 2205          HEART Risk Score      Flowsheet Row Most Recent Value   Heart Score Risk Calculator    History 0 Filed at: 05/19/2025 2205   ECG 1 Filed at: 05/19/2025 2205   Age 2 Filed at: 05/19/2025 2205   Risk Factors 2 Filed at: 05/19/2025 2205   Troponin 1 Filed at: 05/19/2025 2205   HEART Score 6 Filed at: 05/19/2025 2205                      No data recorded        SBIRT 22yo+      Flowsheet Row Most Recent Value   Initial Alcohol Screen: US AUDIT-C     1. How often do you have a drink containing alcohol? 0 Filed at: 05/19/2025 2049   2. How many drinks containing alcohol do you have on a typical day you are drinking?  0 Filed at: 05/19/2025 2049   3b. FEMALE Any Age, or MALE 65+: How often do you have 4 or more drinks on one occassion? 0 Filed at: 05/19/2025 2049   Audit-C Score 0 Filed at: 05/19/2025 2049   HORACIO: How many times in the past year have you...    Used an illegal drug or used a prescription medication for non-medical reasons? Never Filed at: 05/19/2025 2049                             History of Present Illness       Chief Complaint   Patient presents with    Flu Symptoms     Pt c/o SOB, chest pain, cough, body aches, and diarrhea x 1 week. Pt has been around covid.    Chest Pain    Shortness of Breath       Past Medical History:   Diagnosis Date    Angina at rest (HCC)     Arthritis .    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      Past Surgical History:   Procedure Laterality Date    ANGIOPLASTY      CHOLECYSTECTOMY      CORONARY ARTERY BYPASS GRAFT      CORONARY STENT PLACEMENT      x3    GALLBLADDER SURGERY      HYSTERECTOMY      KNEE SURGERY Right     OTHER SURGICAL HISTORY      Double Bypass       Family History   Problem Relation Age of Onset    Breast cancer Mother 52    Colon cancer Father     Breast cancer Maternal Aunt     Ovarian cancer Maternal Aunt     Breast cancer Maternal Aunt     Cervical cancer Maternal Aunt     Uterine cancer Maternal Aunt     No Known Problems Sister     No Known Problems Daughter     No Known Problems Sister     No Known Problems Sister       Social History[1]   E-Cigarette/Vaping    E-Cigarette Use Never User       E-Cigarette/Vaping Substances      I have reviewed and agree with the history as documented.     Patient is a 69-year-old female who presents to the emergency room for multiple symptoms.  Family at bedside.  History of MI, diabetes, stroke, CHF, hypertension.  Patient reports symptoms have been ongoing x1 week.  Reports known COVID exposure.  Reports rhinorrhea, congestion, SOB, productive cough, nonbloody watery diarrhea.  Associated bilateral lower extremity edema.  Family were concerned as patient was disoriented today.  Patient does not usually get disoriented or confused.  Denies any AMS on  initial evaluation per family.  Denies any other symptoms.  Denies trauma, injury, falls. Patient is a former smoker.  Does not wear oxygen at home, on 4L NC on initial evaluation due to hypoxia to 84% on room air. + ASA and Plavix      Flu Symptoms  Presenting symptoms: cough, diarrhea, rhinorrhea and shortness of breath    Presenting symptoms: no fever, no headaches, no nausea, no sore throat and no vomiting    Associated symptoms: nasal congestion    Associated symptoms: no chills and no ear pain    Chest Pain  Associated symptoms: cough and shortness of breath    Associated symptoms: no abdominal pain, no back pain, no dysphagia, no fever, no headache, no nausea, no palpitations and not vomiting    Shortness of Breath  Associated symptoms: chest pain and cough    Associated symptoms: no abdominal pain, no ear pain, no fever, no headaches, no rash, no sore throat and no vomiting        Review of Systems   Constitutional:  Negative for chills and fever.   HENT:  Positive for congestion and rhinorrhea. Negative for ear pain, sore throat, trouble swallowing and voice change.    Eyes:  Negative for pain and visual disturbance.   Respiratory:  Positive for cough and shortness of breath.    Cardiovascular:  Positive for chest pain and leg swelling. Negative for palpitations.   Gastrointestinal:  Positive for diarrhea. Negative for abdominal pain, nausea and vomiting.   Genitourinary:  Negative for dysuria and hematuria.   Musculoskeletal:  Negative for arthralgias and back pain.   Skin:  Negative for color change and rash.   Neurological:  Negative for seizures, syncope and headaches.   Psychiatric/Behavioral:  Positive for confusion.    All other systems reviewed and are negative.          Objective       ED Triage Vitals [05/19/25 2049]   Temperature Pulse Blood Pressure Respirations SpO2 Patient Position - Orthostatic VS   98.5 °F (36.9 °C) 98 141/85 20 (!) 84 % Sitting      Temp Source Heart Rate Source BP Location  FiO2 (%) Pain Score    Temporal Monitor Left arm -- --      Vitals      Date and Time Temp Pulse SpO2 Resp BP Pain Score FACES Pain Rating User   05/20/25 0000 -- 90 90 % 21 155/69 -- --    05/19/25 2315 -- 90 91 % 20 164/77 -- --    05/19/25 2050 -- -- 92 % -- -- -- --    05/19/25 2049 98.5 °F (36.9 °C) 98 84 % 20 141/85 -- -- LA            Physical Exam  Vitals and nursing note reviewed.   Constitutional:       General: She is not in acute distress.     Appearance: She is well-developed. She is obese.   HENT:      Head: Normocephalic and atraumatic.      Nose: Nose normal.      Mouth/Throat:      Mouth: Mucous membranes are moist.      Pharynx: Oropharynx is clear.     Eyes:      Conjunctiva/sclera: Conjunctivae normal.       Cardiovascular:      Rate and Rhythm: Normal rate and regular rhythm.      Pulses: Normal pulses.      Heart sounds: No murmur heard.  Pulmonary:      Effort: Pulmonary effort is normal. No respiratory distress.      Breath sounds: Wheezing present.   Abdominal:      Palpations: Abdomen is soft.      Tenderness: There is no abdominal tenderness. There is no right CVA tenderness or left CVA tenderness.     Musculoskeletal:         General: No swelling.      Cervical back: Normal range of motion and neck supple.     Skin:     General: Skin is warm and dry.      Capillary Refill: Capillary refill takes less than 2 seconds.     Neurological:      General: No focal deficit present.      Mental Status: She is alert and oriented to person, place, and time.     Psychiatric:         Mood and Affect: Mood normal.         Results Reviewed       Procedure Component Value Units Date/Time    HS Troponin I 2hr [[id number]]  (Normal) Collected: 05/19/25 2322    Lab Status: Final result Specimen: Blood from Arm, Right Updated: 05/19/25 2348     hs TnI 2hr 18 ng/L      Delta 2hr hsTnI -1 ng/L     HS Troponin I 4hr [[id number]]     Lab Status: No result Specimen: Blood     RBC Morphology Reflex Test  [[id number]] Collected: 05/19/25 2113    Lab Status: Final result Specimen: Blood from Arm, Right Updated: 05/19/25 2301    Lactic acid, plasma (w/reflex if result > 2.0) [[id number]]  (Normal) Collected: 05/19/25 2232    Lab Status: Final result Specimen: Blood from Hand, Right Updated: 05/19/25 2254     LACTIC ACID 1.0 mmol/L     Narrative:      Result may be elevated if tourniquet was used during collection.    Blood culture #2 [[id number]] Collected: 05/19/25 2247    Lab Status: In process Specimen: Blood from Hand, Left Updated: 05/19/25 2250    Blood culture #1 [[id number]] Collected: 05/19/25 2232    Lab Status: In process Specimen: Blood from Hand, Right Updated: 05/19/25 2244    CBC and differential [[id number]]  (Abnormal) Collected: 05/19/25 2113    Lab Status: Final result Specimen: Blood from Arm, Right Updated: 05/19/25 2216     WBC 13.51 Thousand/uL      RBC 4.34 Million/uL      Hemoglobin 12.8 g/dL      Hematocrit 41.6 %      MCV 96 fL      MCH 29.5 pg      MCHC 30.8 g/dL      RDW 12.8 %      MPV 8.7 fL      Platelets 227 Thousands/uL     Narrative:      This is an appended report.  These results have been appended to a previously verified report.    Manual Differential(PHLEBS Do Not Order) [[id number]]  (Abnormal) Collected: 05/19/25 2113    Lab Status: Final result Specimen: Blood from Arm, Right Updated: 05/19/25 2216     Segmented % 73 %      Lymphocytes % 16 %      Monocytes % 11 %      Eosinophils % 0 %      Basophils % 0 %      Absolute Neutrophils 9.86 Thousand/uL      Absolute Lymphocytes 2.16 Thousand/uL      Absolute Monocytes 1.49 Thousand/uL      Absolute Eosinophils 0.00 Thousand/uL      Absolute Basophils 0.00 Thousand/uL      Total Counted --     RBC Morphology Normal     Platelet Estimate Adequate     Polychromasia Present    FLU/RSV/COVID - if FLU/RSV clinically relevant (2hr TAT) [[id number]]  (Normal) Collected: 05/19/25 2113    Lab Status: Final result Specimen: Nares from Nose Updated:  05/19/25 2202     SARS-CoV-2 Negative     INFLUENZA A PCR Negative     INFLUENZA B PCR Negative     RSV PCR Negative    Narrative:      This test has been performed using the CoV-2/Flu/RSV plus assay on the [employer] platform. This test has been validated by the  and verified by the performing laboratory.     This test is designed to amplify and detect the following: nucleocapsid (N), envelope (E), and RNA-dependent RNA polymerase (RdRP) genes of the SARS-CoV-2 genome; matrix (M), basic polymerase (PB2), and acidic protein (PA) segments of the influenza A genome; matrix (M) and non-structural protein (NS) segments of the influenza B genome, and the nucleocapsid genes of RSV A and RSV B.     Positive results are indicative of the presence of Flu A, Flu B, RSV, and/or SARS-CoV-2 RNA. Positive results for SARS-CoV-2 or suspected novel influenza should be reported to state, local, or federal health departments according to local reporting requirements.      All results should be assessed in conjunction with clinical presentation and other laboratory markers for clinical management.     FOR PEDIATRIC PATIENTS - copy/paste COVID Guidelines URL to browser: [email or web address]       HS Troponin 0hr (reflex protocol) [[id number]]  (Normal) Collected: 05/19/25 2113    Lab Status: Final result Specimen: Blood from Arm, Right Updated: 05/19/25 2146     hs TnI 0hr 19 ng/L     B-Type Natriuretic Peptide(BNP) [[id number]]  (Normal) Collected: 05/19/25 2113    Lab Status: Final result Specimen: Blood from Arm, Right Updated: 05/19/25 2145     BNP 43 pg/mL     Comprehensive metabolic panel [[id number]]  (Abnormal) Collected: 05/19/25 2113    Lab Status: Final result Specimen: Blood from Arm, Right Updated: 05/19/25 2139     Sodium 133 mmol/L      Potassium 3.6 mmol/L      Chloride 96 mmol/L      CO2 27 mmol/L      ANION GAP 10 mmol/L      BUN 15 mg/dL       Creatinine 0.97 mg/dL      Glucose 124 mg/dL      Calcium 9.0 mg/dL      AST 29 U/L      ALT 16 U/L      Alkaline Phosphatase 67 U/L      Total Protein 7.7 g/dL      Albumin 4.0 g/dL      Total Bilirubin 0.67 mg/dL      eGFR 58 ml/min/1.73sq m     Narrative:      National Kid[employer] Disease Foundation guidelines for Chronic Kidney Disease (CKD):     Stage 1 with normal or high GFR (GFR > 90 mL/min/1.73 square meters)    Stage 2 Mild CKD (GFR = 60-89 mL/min/1.73 square meters)    Stage 3A Moderate CKD (GFR = 45-59 mL/min/1.73 square meters)    Stage 3B Moderate CKD (GFR = 30-44 mL/min/1.73 square meters)    Stage 4 Severe CKD (GFR = 15-29 mL/min/1.73 square meters)    Stage 5 End Stage CKD (GFR <15 mL/min/1.73 square meters)  Note: GFR calculation is accurate only with a steady state creatinine    D-Dimer [[id number]]  (Abnormal) Collected: 05/19/25 2113    Lab Status: Final result Specimen: Blood from Arm, Right Updated: 05/19/25 2137     D-Dimer, Quant 0.89 ug/ml FEU     Narrative:      In the evaluation for possible pulmonary embolism, in the appropriate (Well's Score of 4 or less) patient, the age adjusted d-dimer cutoff for this patient can be calculated as:    Age x 0.01 (in ug/mL) for Age-adjusted D-dimer exclusion threshold for a patient over 50 years.            CT head without contrast   Final Interpretation by Sang Dubois DO (05/19 3303)      No acute intracranial abnormality is seen.      Other findings as above                  Workstation performed: [id number]         CT pe study w abdomen pelvis w contrast   Final Interpretation by Sang Dubois DO (05/20 0001)      No pulmonary embolism is seen.      Mild scattered pulmonary emphysematous changes. Alveolar opacities in the right middle and lower lobes. Scattered reticulonodular opacities are seen in the bilateral lungs, as described, suspicious for multifocal infection. Correlation with the patient's    symptoms and laboratory  values recommended.      Partially distended bladder. Mild circumferential bladder wall thickening noted, consider a cystitis. Correlation with the patient's symptoms, laboratory values, and urinalysis recommended.      Liquid stool in the colon, possibly related to a mild enteritis. No discrete evidence of bowel obstruction.      Small to moderate-sized fat-containing ventral hernia in the anterior right upper abdomen, associated stranding of the herniated fat, consider a component of inflammation/fat necrosis.      Coronary atherosclerosis, status post CABG, degenerative changes of the spine, and other findings as above.            Workstation performed: [id number]         XR chest 2 views    (Results Pending)       ECG 12 Lead Documentation Only    Date/Time: 5/19/2025 8:57 PM    Performed by: Courtney Cavazos PA-C  Authorized by: Courtney Cavazos PA-C    Indications / Diagnosis:  Cardiac work-up  ECG reviewed by me, the ED Provider: yes    Patient location:  ED  Interpretation:     Interpretation: non-specific    Rate:     ECG rate:  101    ECG rate assessment: tachycardic    Rhythm:     Rhythm: sinus tachycardia    ST segments:     ST segments:  Normal  T waves:     T waves: normal        ED Medication and Procedure Management   Prior to Admission Medications   Prescriptions Last Dose Informant Patient Reported? Taking?   ALPRAZolam (XANAX) 0.25 mg tablet  Self Yes No   Sig: Take 0.25 mg by mouth daily as needed for anxiety   Blood Glucose Monitoring Suppl (ONE TOUCH ULTRA 2) w/Device KIT  Self Yes No   Lancets (OneTouch Delica Plus [id number]) MISC  Self Yes No   Omega-3 1000 MG CAPS  Self Yes No   Sig: Take by mouth   acetaminophen (TYLENOL) 325 mg tablet  Self Yes No   Sig: every 6 (six) hours   albuterol (PROVENTIL HFA,VENTOLIN HFA) 90 mcg/act inhaler  Self Yes No   Sig: INHALE 2 PUFFS EVERY 6 HOURS AS NEEDED FOR WHEEZING   amLODIPine (NORVASC) 5 mg tablet  Self Yes No   aspirin 81 MG tablet  Self Yes No    Sig: Take by mouth   atorvastatin (LIPITOR) 80 mg tablet  Self Yes No   Sig: Take 80 mg by mouth   calcium-vitamin D 250-100 MG-UNIT per tablet  Self Yes No   Sig: Take 1 tablet by mouth 2 (two) times a day   clopidogrel (PLAVIX) 75 mg tablet  Self Yes No   Sig: TAKE 1 TABLET DAILY   cyanocobalamin (VITAMIN B-12) 100 mcg tablet  Self Yes No   Sig: Take 1 tablet by mouth   glucose blood test strip  Self Yes No   guaiFENesin (ROBITUSSIN) 100 MG/5ML oral liquid  Self Yes No   Sig: Take 100 mg by mouth Three times daily as needed prm   ipratropium-albuterol (DUO-NEB) 0.5-2.5 mg/3 mL nebulizer solution  Self No No   Sig: Take 3 mL by nebulization every 6 (six) hours as needed for wheezing or shortness of breath   isosorbide mononitrate (IMDUR) 30 mg 24 hr tablet  Self No No   Sig: Take 1 tablet (30 mg total) by mouth daily   losartan (COZAAR) 50 mg tablet  Self Yes No   methylPREDNISolone 4 MG tablet therapy pack  Self Yes No   Si mg   Patient not taking: Reported on 2024   metoprolol succinate (TOPROL-XL) 50 mg 24 hr tablet  Self Yes No   montelukast (SINGULAIR) 10 mg tablet  Self Yes No   nitroglycerin (NITROSTAT) 0.4 mg SL tablet  Self Yes No   Sig: Take 1 tablet by mouth Three times a day   nystatin (MYCOSTATIN) cream  Self Yes No   Patient not taking: Reported on 2024   pantoprazole (PROTONIX) 40 mg tablet  Self No No   Sig: TAKE ONE TABLET BY MOUTH ONCE DAILY   pantoprazole (PROTONIX) 40 mg tablet   No No   Sig: TAKE 1 TABLET BY MOUTH 2 TIMES A DAY BEFORE MEALS.   zolpidem (AMBIEN) 10 mg tablet  Self Yes No   Sig: Take 10 mg by mouth daily      Facility-Administered Medications: None     Patient's Medications   Discharge Prescriptions    No medications on file     No discharge procedures on file.  ED SEPSIS DOCUMENTATION   Time reflects when diagnosis was documented in both MDM as applicable and the Disposition within this note       Time User Action Codes Description Comment    2025 12:08 AM  [place], Courtney Add [J18.9] Pneumonia     5/20/2025 12:08 AM Courtney Cavazos [K52.9] Enteritis     5/20/2025 12:08 AM Courtney Cavazos [J44.1] COPD exacerbation (Formerly [facility])     5/20/2025 12:08 AM Courtney Cavazos [I50.9] Acute CHF (congestive heart failure) (Formerly [facility])     5/20/2025 12:08 AM Courtney Cavazos [A41.9] Sepsis (Formerly [facility])                    [1]   Social History  Tobacco Use    Smoking status: Former    Smokeless tobacco: Never   Vaping Use    Vaping status: Never Used   Substance Use Topics    Alcohol use: Not Currently    Drug use: No        Courtney Cavazos PA-C  05/20/25 0021

## 2025-05-20 NOTE — ASSESSMENT & PLAN NOTE
Met criteria due to tachycardia/leukocytosis with evidence of pneumonia on CT  Lactate 1 with initial procalcitonin 0.17  Flu RSV COVID-negative  Follow-up blood cultures  Monitor for any fevers, monitor white blood cell count(currently 30,000)  Continue IV ceftriaxone  IVF bolus deferred as per ED patient with hx of CHF. Will defer continuous IVF hydration to avoid fluid overload

## 2025-05-20 NOTE — ASSESSMENT & PLAN NOTE
Complaining of watery, nonbloody diarrhea x 1 week  Denies recent antibiotic use, no recent travel  CT a/p revealing liquid stool in colon possibly related to mild enteritis  Clear liquid diet, advance as tolerated  Consider GI consult if diarrhea does not resolve

## 2025-05-20 NOTE — ASSESSMENT & PLAN NOTE
Per chart review patient is s/p CABG with stenting x 3  Follows with cardiology as outpatient  EKG revealing sinus tachycardia  bpm  Patient denies CP  Continue PTA ASA, atorvastatin, Plavix, losartan, metoprolol and SL nitro as needed  STAT EKG if patient develops CP

## 2025-05-20 NOTE — ASSESSMENT & PLAN NOTE
Per ED report, family states patient was confused earlier today, however has resolved by time of presentation to ED. Family denies history of confusion/sundowning in patient  CTh negative for acute abnormality  Patient is AAO x 4  Delirium precautions initiated

## 2025-05-20 NOTE — ASSESSMENT & PLAN NOTE
Per ED report, patient was hypoxic to mid 80s on room air  Currently SpO2: 90 % on Nasal Cannula O2 Flow Rate (L/min): 4.5 L/min  Continue NC as needed to maintain O2 sat greater than 90%

## 2025-05-20 NOTE — RESPIRATORY THERAPY NOTE
RT Protocol Note  Cristine Snell 69 y.o. female MRN: [id number]  Unit/Bed#: -01 Encounter: [id number]    Assessment    Principal Problem:    Sepsis (HCC)  Active Problems:    HTN (hypertension)    Pneumonia    Generalized anxiety disorder    CAD in native artery    Type 2 diabetes mellitus (HCC)    Hyperlipidemia    Acute respiratory failure with hypoxia (HCC)    Enteritis    Encephalopathy    History of stroke    COPD (chronic obstructive pulmonary disease) (HCC)      Home Pulmonary Medications:         Past Medical History:   Diagnosis Date    Angina at rest (HCC)     Arthritis .    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     Social History     Socioeconomic History    Marital status: Single     Spouse name: None    Number of children: None    Years of education: None    Highest education level: None   Occupational History    None   Tobacco Use    Smoking status: Former    Smokeless tobacco: Never   Vaping Use    Vaping status: Never Used   Substance and Sexual Activity    Alcohol use: Not Currently    Drug use: No    Sexual activity: Not Currently     Birth control/protection: Surgical     Comment: [id number]-[id number]   Other Topics Concern    None   Social History Narrative    None     Social Drivers of Health     Financial Resource Strain: Not At Risk (3/11/2025)    Received from [facility]    Financial Insecurity     In the last 12 months did you skip medications to save money?: No     In the last 12 months was there a time when you needed to see a doctor but could not because of cost?: No   Food Insecurity: Food Insecurity Present (5/20/2025)    Nursing - Inadequate Food Risk Classification     Worried About Running Out of Food in the Last Year: Not on  "file     Ran Out of Food in the Last Year: Not on file     Ran Out of Food in the Last Year: Sometimes true   Transportation Needs: Unmet Transportation Needs (2025)    Nursing - Transportation Risk Classification     Lack of Transportation: Not on file     Lack of Transportation: Yes   Physical Activity: Not on file   Stress: Stress Concern Present (2024)    Received from [facility]    [employer] - Occupational Stress Questionnaire     Feeling of Stress : To some extent   Social Connections: Socially Integrated (3/11/2025)    Received from [facility]    Social Connection     Do you often feel lonely?: No   Intimate Partner Violence: Unknown (2025)    Nursing IPS     Feels Physically and Emotionally Safe: Not on file     Physically Hurt by Someone: Not on file     Humiliated or Emotionally Abused by Someone: Not on file     Physically Hurt by Someone: No     Hurt or Threatened by Someone: No   Housing Stability: Unknown (2025)    Nursing: Inadequate Housing Risk Classification     Has Housing: Not on file     Worried About Losing Housing: Not on file     Unable to Get Utilities: Not on file     Unable to Pay for Housing in the Last Year: No     Has Housin       Subjective         Objective    Physical Exam:   Assessment Type: Assess only  General Appearance: Awake  Respiratory Pattern: Dyspnea at rest, Dyspnea with exertion  Chest Assessment: Chest expansion symmetrical  Bilateral Breath Sounds: Diminished, Rhonchi  Cough: Non-productive  O2 Device: NC    Vitals:  Blood pressure (!) 138/46, pulse 87, temperature 97.8 °F (36.6 °C), temperature source Oral, resp. rate 18, height 4' 11\" (1.499 m), weight 100 kg (220 lb 10.9 oz), SpO2 91%, not currently breastfeeding.          Imaging and other studies:     O2 Device: NC     Plan    Respiratory Plan: Home Bronchodilator Patient pathway        Resp Comments: pt with hx of copd, uses " nebs & inhalers prn at home, will continue with this regimen

## 2025-05-20 NOTE — ASSESSMENT & PLAN NOTE
Per ED report, patient was hypoxic to mid 80s on room air  Currently 94% on 4L o2  Due to pneumonia/underlying COPD

## 2025-05-20 NOTE — ASSESSMENT & PLAN NOTE
As evidenced by tachycardia, leukocytosis 13.51 + pneumonia and enteritis  CT PE abdomen/pelvis revealing scattered opacities in b/l lungs suspicious for multifocal infection and liquid stool in colon possibly related to mild enteritis  Procalcitonin, lactic acid, COVID/flu negative  O2 sat 90% on 4.5 L NC  IVF bolus deferred as per ED patient with hx of CHF. Will defer continuous IVF hydration to avoid fluid overload   Received IV ceftriaxone/azithromycin and Solu-Medrol in ED, continue abx  Blood/sputum cultures and strep/legionella urinary antigen pending, plan to follow-up  Trend leukocytosis with daily CBC  IS hourly, respiratory protocol, aspiration precautions

## 2025-05-20 NOTE — ASSESSMENT & PLAN NOTE
Complaining of watery, nonbloody diarrhea x 1 week  Was nauseous yesterday but improved this a.m.  No abdominal pain    Denies recent antibiotic use, no recent travel  CT a/p revealing liquid stool in colon possibly related to mild enteritis  Advance to soft surgical diet  Only 1 bm x 24 hrs

## 2025-05-20 NOTE — ASSESSMENT & PLAN NOTE
Lab Results   Component Value Date    HGBA1C 6.5 (H) 03/10/2025   SSI AC/at bedtime ordered  Monitor Accu-Cheks closely, hypoglycemia protocol

## 2025-05-20 NOTE — PLAN OF CARE
Problem: PAIN - ADULT  Goal: Verbalizes/displays adequate comfort level or baseline comfort level  Description: Interventions:  - Encourage patient to monitor pain and request assistance  - Assess pain using appropriate pain scale  - Administer analgesics as ordered based on type and severity of pain and evaluate response  - Implement non-pharmacological measures as appropriate and evaluate response  - Consider cultural and social influences on pain and pain management  - Notify physician/advanced practitioner if interventions unsuccessful or patient reports new pain  - Educate patient/family on pain management process including their role and importance of  reporting pain   - Provide non-pharmacologic/complimentary pain relief interventions  Outcome: Progressing     Problem: INFECTION - ADULT  Goal: Absence or prevention of progression during hospitalization  Description: INTERVENTIONS:  - Assess and monitor for signs and symptoms of infection  - Monitor lab/diagnostic results  - Monitor all insertion sites, i.e. indwelling lines, tubes, and drains  - Monitor endotracheal if appropriate and nasal secretions for changes in amount and color  - [place] appropriate cooling/warming therapies per order  - Administer medications as ordered  - Instruct and encourage patient and family to use good hand hygiene technique  - Identify and instruct in appropriate isolation precautions for identified infection/condition  Outcome: Progressing  Goal: Absence of fever/infection during neutropenic period  Description: INTERVENTIONS:  - Monitor WBC  - Perform strict hand hygiene  - Limit to healthy visitors only  - No plants, dried, fresh or silk flowers with butler in patient room  Outcome: Progressing

## 2025-05-20 NOTE — RESPIRATORY THERAPY NOTE
RT Protocol Note  Cristine Snell 69 y.o. female MRN: [id number]  Unit/Bed#: -01 Encounter: [id number]    Assessment    Principal Problem:    Sepsis (HCC)  Active Problems:    HTN (hypertension)    Pneumonia    Generalized anxiety disorder    CAD in native artery    Type 2 diabetes mellitus (HCC)    Hyperlipidemia    Acute respiratory failure with hypoxia (HCC)    Enteritis    Encephalopathy    History of stroke    COPD (chronic obstructive pulmonary disease) (HCC)      Home Pulmonary Medications:  Albuterol inhaler PRN     Ipratropium-albuterol aerosol tx PRN       Past Medical History:   Diagnosis Date    Angina at rest (HCC)     Arthritis .    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     Social History     Socioeconomic History    Marital status: Single     Spouse name: None    Number of children: None    Years of education: None    Highest education level: None   Occupational History    None   Tobacco Use    Smoking status: Former    Smokeless tobacco: Never   Vaping Use    Vaping status: Never Used   Substance and Sexual Activity    Alcohol use: Not Currently    Drug use: No    Sexual activity: Not Currently     Birth control/protection: Surgical     Comment: MARTIR-BSO   Other Topics Concern    None   Social History Narrative    None     Social Drivers of Health     Financial Resource Strain: Not At Risk (3/11/2025)    Received from [facility]    Financial Insecurity     In the last 12 months did you skip medications to save money?: No     In the last 12 months was there a time when you needed to see a doctor but could not because of cost?: No   Food Insecurity: Food Insecurity Present (5/20/2025)    Nursing - Inadequate Food Risk Classification  "    Worried About Running Out of Food in the Last Year: Not on file     Ran Out of Food in the Last Year: Not on file     Ran Out of Food in the Last Year: Sometimes true   Transportation Needs: Unmet Transportation Needs (2025)    Nursing - Transportation Risk Classification     Lack of Transportation: Not on file     Lack of Transportation: Yes   Physical Activity: Not on file   Stress: Stress Concern Present (2024)    Received from [facility]    [employer] - Occupational Stress Questionnaire     Feeling of Stress : To some extent   Social Connections: Socially Integrated (3/11/2025)    Received from [facility]    Social Connection     Do you often feel lonely?: No   Intimate Partner Violence: Unknown (2025)    Nursing IPS     Feels Physically and Emotionally Safe: Not on file     Physically Hurt by Someone: Not on file     Humiliated or Emotionally Abused by Someone: Not on file     Physically Hurt by Someone: No     Hurt or Threatened by Someone: No   Housing Stability: Unknown (2025)    Nursing: Inadequate Housing Risk Classification     Has Housing: Not on file     Worried About Losing Housing: Not on file     Unable to Get Utilities: Not on file     Unable to Pay for Housing in the Last Year: No     Has Housin       Subjective         Objective    Physical Exam:   Assessment Type: Assess only  General Appearance: Awake, Drowsy  Respiratory Pattern: Dyspnea with exertion, Dyspnea at rest  Chest Assessment: Chest expansion symmetrical  Bilateral Breath Sounds: Rhonchi  Cough: Non-productive  O2 Device: NC    Vitals:  Blood pressure 154/72, pulse 90, temperature 98.4 °F (36.9 °C), resp. rate 18, height 4' 11\" (1.499 m), weight 100 kg (220 lb 10.9 oz), SpO2 94%, not currently breastfeeding.          Imaging and other studies:     O2 Device: NC     Plan    Respiratory Plan: Home Bronchodilator Patient pathway        Resp " Comments: 69 y.o female PMHx COPD. No distress noted at this time Pt found on NC 4L . SpO2 92%. Pt denies SOB at this time. Pt does use inhaler and aerosol tx at home. Plan to continue home medications at this time.

## 2025-05-21 LAB
ANION GAP SERPL CALCULATED.3IONS-SCNC: 9 MMOL/L (ref 4–13)
BASOPHILS # BLD AUTO: 0.05 THOUSANDS/ÂΜL (ref 0–0.1)
BASOPHILS NFR BLD AUTO: 0 % (ref 0–1)
BUN SERPL-MCNC: 34 MG/DL (ref 5–25)
CALCIUM SERPL-MCNC: 9.3 MG/DL (ref 8.4–10.2)
CHLORIDE SERPL-SCNC: 98 MMOL/L (ref 96–108)
CO2 SERPL-SCNC: 28 MMOL/L (ref 21–32)
CREAT SERPL-MCNC: 0.95 MG/DL (ref 0.6–1.3)
EOSINOPHIL # BLD AUTO: 0.07 THOUSAND/ÂΜL (ref 0–0.61)
EOSINOPHIL NFR BLD AUTO: 0 % (ref 0–6)
ERYTHROCYTE [DISTWIDTH] IN BLOOD BY AUTOMATED COUNT: 12.6 % (ref 11.6–15.1)
GFR SERPL CREATININE-BSD FRML MDRD: 60 ML/MIN/1.73SQ M
GLUCOSE SERPL-MCNC: 120 MG/DL (ref 65–140)
GLUCOSE SERPL-MCNC: 126 MG/DL (ref 65–140)
GLUCOSE SERPL-MCNC: 132 MG/DL (ref 65–140)
GLUCOSE SERPL-MCNC: 139 MG/DL (ref 65–140)
GLUCOSE SERPL-MCNC: 163 MG/DL (ref 65–140)
HCT VFR BLD AUTO: 41.9 % (ref 34.8–46.1)
HGB BLD-MCNC: 13.2 G/DL (ref 11.5–15.4)
IMM GRANULOCYTES # BLD AUTO: 0.32 THOUSAND/UL (ref 0–0.2)
IMM GRANULOCYTES NFR BLD AUTO: 1 % (ref 0–2)
LYMPHOCYTES # BLD AUTO: 1.87 THOUSANDS/ÂΜL (ref 0.6–4.47)
LYMPHOCYTES NFR BLD AUTO: 6 % (ref 14–44)
MCH RBC QN AUTO: 30.1 PG (ref 26.8–34.3)
MCHC RBC AUTO-ENTMCNC: 31.5 G/DL (ref 31.4–37.4)
MCV RBC AUTO: 95 FL (ref 82–98)
MONOCYTES # BLD AUTO: 0.98 THOUSAND/ÂΜL (ref 0.17–1.22)
MONOCYTES NFR BLD AUTO: 3 % (ref 4–12)
NEUTROPHILS # BLD AUTO: 27.22 THOUSANDS/ÂΜL (ref 1.85–7.62)
NEUTS SEG NFR BLD AUTO: 90 % (ref 43–75)
NRBC BLD AUTO-RTO: 0 /100 WBCS
PLATELET # BLD AUTO: 272 THOUSANDS/UL (ref 149–390)
PMV BLD AUTO: 9.2 FL (ref 8.9–12.7)
POTASSIUM SERPL-SCNC: 4 MMOL/L (ref 3.5–5.3)
PROCALCITONIN SERPL-MCNC: 0.16 NG/ML
RBC # BLD AUTO: 4.39 MILLION/UL (ref 3.81–5.12)
SODIUM SERPL-SCNC: 135 MMOL/L (ref 135–147)
WBC # BLD AUTO: 30.51 THOUSAND/UL (ref 4.31–10.16)

## 2025-05-21 PROCEDURE — 82948 REAGENT STRIP/BLOOD GLUCOSE: CPT

## 2025-05-21 PROCEDURE — 85025 COMPLETE CBC W/AUTO DIFF WBC: CPT | Performed by: FAMILY MEDICINE

## 2025-05-21 PROCEDURE — 94664 DEMO&/EVAL PT USE INHALER: CPT

## 2025-05-21 PROCEDURE — 99232 SBSQ HOSP IP/OBS MODERATE 35: CPT | Performed by: FAMILY MEDICINE

## 2025-05-21 PROCEDURE — 80048 BASIC METABOLIC PNL TOTAL CA: CPT | Performed by: FAMILY MEDICINE

## 2025-05-21 PROCEDURE — 84145 PROCALCITONIN (PCT): CPT

## 2025-05-21 RX ORDER — ZOLPIDEM TARTRATE 5 MG/1
10 TABLET ORAL
Status: DISCONTINUED | OUTPATIENT
Start: 2025-05-21 | End: 2025-05-21

## 2025-05-21 RX ORDER — ZOLPIDEM TARTRATE 5 MG/1
5 TABLET ORAL
Status: DISCONTINUED | OUTPATIENT
Start: 2025-05-21 | End: 2025-05-21

## 2025-05-21 RX ORDER — ZOLPIDEM TARTRATE 5 MG/1
10 TABLET ORAL
Status: DISCONTINUED | OUTPATIENT
Start: 2025-05-21 | End: 2025-05-24 | Stop reason: HOSPADM

## 2025-05-21 RX ADMIN — ENOXAPARIN SODIUM 40 MG: 40 INJECTION SUBCUTANEOUS at 09:10

## 2025-05-21 RX ADMIN — ZOLPIDEM TARTRATE 10 MG: 5 TABLET, FILM COATED ORAL at 00:47

## 2025-05-21 RX ADMIN — METHYLPREDNISOLONE SODIUM SUCCINATE 40 MG: 40 INJECTION, POWDER, FOR SOLUTION INTRAMUSCULAR; INTRAVENOUS at 22:28

## 2025-05-21 RX ADMIN — GUAIFENESIN 100 MG: 200 SOLUTION ORAL at 14:58

## 2025-05-21 RX ADMIN — CEFTRIAXONE SODIUM 1000 MG: 10 INJECTION, POWDER, FOR SOLUTION INTRAVENOUS at 22:41

## 2025-05-21 RX ADMIN — MONTELUKAST 10 MG: 10 TABLET, FILM COATED ORAL at 09:10

## 2025-05-21 RX ADMIN — ALBUTEROL SULFATE 1 PUFF: 90 AEROSOL, METERED RESPIRATORY (INHALATION) at 05:01

## 2025-05-21 RX ADMIN — CLOPIDOGREL 75 MG: 75 TABLET ORAL at 09:10

## 2025-05-21 RX ADMIN — ZOLPIDEM TARTRATE 10 MG: 5 TABLET, COATED ORAL at 22:41

## 2025-05-21 RX ADMIN — AMLODIPINE BESYLATE 5 MG: 5 TABLET ORAL at 09:09

## 2025-05-21 RX ADMIN — ATORVASTATIN CALCIUM 80 MG: 40 TABLET, FILM COATED ORAL at 16:16

## 2025-05-21 RX ADMIN — ACETAMINOPHEN 650 MG: 325 TABLET, FILM COATED ORAL at 09:09

## 2025-05-21 RX ADMIN — METHYLPREDNISOLONE SODIUM SUCCINATE 40 MG: 40 INJECTION, POWDER, FOR SOLUTION INTRAMUSCULAR; INTRAVENOUS at 13:07

## 2025-05-21 RX ADMIN — METOPROLOL SUCCINATE 50 MG: 50 TABLET, EXTENDED RELEASE ORAL at 09:10

## 2025-05-21 RX ADMIN — ENOXAPARIN SODIUM 40 MG: 40 INJECTION SUBCUTANEOUS at 22:41

## 2025-05-21 RX ADMIN — ALPRAZOLAM 0.25 MG: 0.25 TABLET ORAL at 14:58

## 2025-05-21 RX ADMIN — LOSARTAN POTASSIUM 50 MG: 50 TABLET, FILM COATED ORAL at 09:10

## 2025-05-21 RX ADMIN — ASPIRIN 81 MG 81 MG: 81 TABLET ORAL at 09:09

## 2025-05-21 RX ADMIN — INSULIN LISPRO 1 UNITS: 100 INJECTION, SOLUTION INTRAVENOUS; SUBCUTANEOUS at 09:12

## 2025-05-21 RX ADMIN — METHYLPREDNISOLONE SODIUM SUCCINATE 40 MG: 40 INJECTION, POWDER, FOR SOLUTION INTRAMUSCULAR; INTRAVENOUS at 05:50

## 2025-05-21 RX ADMIN — GUAIFENESIN 100 MG: 200 SOLUTION ORAL at 01:39

## 2025-05-21 NOTE — PLAN OF CARE
Problem: PAIN - ADULT  Goal: Verbalizes/displays adequate comfort level or baseline comfort level  Description: Interventions:  - Encourage patient to monitor pain and request assistance  - Assess pain using appropriate pain scale  - Administer analgesics as ordered based on type and severity of pain and evaluate response  - Implement non-pharmacological measures as appropriate and evaluate response  - Consider cultural and social influences on pain and pain management  - Notify physician/advanced practitioner if interventions unsuccessful or patient reports new pain  - Educate patient/family on pain management process including their role and importance of  reporting pain   - Provide non-pharmacologic/complimentary pain relief interventions  Outcome: Progressing     Problem: INFECTION - ADULT  Goal: Absence or prevention of progression during hospitalization  Description: INTERVENTIONS:  - Assess and monitor for signs and symptoms of infection  - Monitor lab/diagnostic results  - Monitor all insertion sites, i.e. indwelling lines, tubes, and drains  - Monitor endotracheal if appropriate and nasal secretions for changes in amount and color  - [place] appropriate cooling/warming therapies per order  - Administer medications as ordered  - Instruct and encourage patient and family to use good hand hygiene technique  - Identify and instruct in appropriate isolation precautions for identified infection/condition  Outcome: Progressing  Goal: Absence of fever/infection during neutropenic period  Description: INTERVENTIONS:  - Monitor WBC  - Perform strict hand hygiene  - Limit to healthy visitors only  - No plants, dried, fresh or silk flowers with butler in patient room  Outcome: Progressing     Problem: SAFETY ADULT  Goal: Patient will remain free of falls  Description: INTERVENTIONS:  - Educate patient/family on patient safety including physical limitations  - Instruct patient to call for assistance with activity   -  Consider consulting OT/PT to assist with strengthening/mobility based on AM PAC & JH-HLM score  - Consult OT/PT to assist with strengthening/mobility   - Keep Call bell within reach  - Keep bed low and locked with side rails adjusted as appropriate  - Keep care items and personal belongings within reach  - Initiate and maintain comfort rounds  - Make Fall Risk Sign visible to staff  - Offer Toileting every 2 Hours, in advance of need  - Initiate/Maintain bed alarm  - Obtain necessary fall risk management equipment: bed alarm   - Apply yellow socks and bracelet for high fall risk patients  - Consider moving patient to room near nurses station  Outcome: Progressing  Goal: Maintain or return to baseline ADL function  Description: INTERVENTIONS:  -  Assess patient's ability to carry out ADLs; assess patient's baseline for ADL function and identify physical deficits which impact ability to perform ADLs (bathing, care of mouth/teeth, toileting, grooming, dressing, etc.)  - Assess/evaluate cause of self-care deficits   - Assess range of motion  - Assess patient's mobility; develop plan if impaired  - Assess patient's need for assistive devices and provide as appropriate  - Encourage maximum independence but intervene and supervise when necessary  - Involve family in performance of ADLs  - Assess for home care needs following discharge   - Consider OT consult to assist with ADL evaluation and planning for discharge  - Provide patient education as appropriate  - Monitor functional capacity and physical performance, use of AM PAC & JH-HLM   - Monitor gait, balance and fatigue with ambulation    Outcome: Progressing  Goal: Maintains/Returns to pre admission functional level  Description: INTERVENTIONS:  - Perform AM-PAC 6 Click Basic Mobility/ Daily Activity assessment daily.  - Set and communicate daily mobility goal to care team and patient/family/caregiver.   - Collaborate with rehabilitation services on mobility goals if  consulted  - Perform Range of Motion 3 times a day.  - Reposition patient every 2 hours.  - Dangle patient 3 times a day  - Stand patient 3 times a day  - Ambulate patient 3 times a day  - Out of bed to chair 3 times a day   - Out of bed for meals 3 times a day  - Out of bed for toileting  - Record patient progress and toleration of activity level   Outcome: Progressing     Problem: DISCHARGE PLANNING  Goal: Discharge to home or other facility with appropriate resources  Description: INTERVENTIONS:  - Identify barriers to discharge w/patient and caregiver  - Arrange for needed discharge resources and transportation as appropriate  - Identify discharge learning needs (meds, wound care, etc.)  - Arrange for interpretive services to assist at discharge as needed  - Refer to Case Management Department for coordinating discharge planning if the patient needs post-hospital services based on physician/advanced practitioner order or complex needs related to functional status, cognitive ability, or social support system  Outcome: Progressing     Problem: Knowledge Deficit  Goal: Patient/family/caregiver demonstrates understanding of disease process, treatment plan, medications, and discharge instructions  Description: Complete learning assessment and assess knowledge base.  Interventions:  - Provide teaching at level of understanding  - Provide teaching via preferred learning methods  Outcome: Progressing      Yes Yes

## 2025-05-21 NOTE — RESPIRATORY THERAPY NOTE
05/20/25 2026   Respiratory Protocol   Protocol Initiated? No   Protocol Selection Respiratory   Language Barrier? No   Medical & Social History Reviewed? Yes   Diagnostic Studies Reviewed? Yes   Physical Assessment Performed? Yes   Respiratory Plan No distress/Pulmonary history   Respiratory Assessment   General Appearance Awake;Alert   Respiratory Pattern Normal   Chest Assessment Chest expansion symmetrical   Bilateral Breath Sounds Diminished   R Breath Sounds Rales  (base)   L Breath Sounds Rales  (base)   Resp Comments pt requesting PRN, talking with visitors no distress noted   O2 Device 4L o2 nc   Additional Assessments   SpO2 92 %

## 2025-05-21 NOTE — PLAN OF CARE
Problem: PAIN - ADULT  Goal: Verbalizes/displays adequate comfort level or baseline comfort level  Description: Interventions:  - Encourage patient to monitor pain and request assistance  - Assess pain using appropriate pain scale  - Administer analgesics as ordered based on type and severity of pain and evaluate response  - Implement non-pharmacological measures as appropriate and evaluate response  - Consider cultural and social influences on pain and pain management  - Notify physician/advanced practitioner if interventions unsuccessful or patient reports new pain  - Educate patient/family on pain management process including their role and importance of  reporting pain   - Provide non-pharmacologic/complimentary pain relief interventions  Outcome: Progressing     Problem: DISCHARGE PLANNING  Goal: Discharge to home or other facility with appropriate resources  Description: INTERVENTIONS:  - Identify barriers to discharge w/patient and caregiver  - Arrange for needed discharge resources and transportation as appropriate  - Identify discharge learning needs (meds, wound care, etc.)  - Arrange for interpretive services to assist at discharge as needed  - Refer to Case Management Department for coordinating discharge planning if the patient needs post-hospital services based on physician/advanced practitioner order or complex needs related to functional status, cognitive ability, or social support system  Outcome: Progressing     Problem: Knowledge Deficit  Goal: Patient/family/caregiver demonstrates understanding of disease process, treatment plan, medications, and discharge instructions  Description: Complete learning assessment and assess knowledge base.  Interventions:  - Provide teaching at level of understanding  - Provide teaching via preferred learning methods  Outcome: Progressing     Problem: INFECTION - ADULT  Goal: Absence or prevention of progression during hospitalization  Description:  INTERVENTIONS:  - Assess and monitor for signs and symptoms of infection  - Monitor lab/diagnostic results  - Monitor all insertion sites, i.e. indwelling lines, tubes, and drains  - Monitor endotracheal if appropriate and nasal secretions for changes in amount and color  - [place] appropriate cooling/warming therapies per order  - Administer medications as ordered  - Instruct and encourage patient and family to use good hand hygiene technique  - Identify and instruct in appropriate isolation precautions for identified infection/condition  Outcome: Progressing  Goal: Absence of fever/infection during neutropenic period  Description: INTERVENTIONS:  - Monitor WBC  - Perform strict hand hygiene  - Limit to healthy visitors only  - No plants, dried, fresh or silk flowers with butler in patient room  Outcome: Progressing

## 2025-05-21 NOTE — PROGRESS NOTES
Progress Note - Hospitalist   Name: Cristine Snell 69 y.o. female I MRN: [id number]  Unit/Bed#: -01 I Date of Admission: 5/19/2025   Date of Service: 5/21/2025 I Hospital Day: 1    Assessment & Plan  Sepsis (HCC)  Met criteria due to tachycardia/leukocytosis with evidence of pneumonia on CT  Lactate 1 with initial procalcitonin 0.17  Flu RSV COVID-negative  Follow-up blood cultures  Monitor for any fevers, monitor white blood cell count(currently 30,000)  Continue IV ceftriaxone  IVF bolus deferred as per ED patient with hx of CHF. Will defer continuous IVF hydration to avoid fluid overload     Pneumonia  C/c : Reports ongoing shortness of breath/productive cough for 1 week.  Denies any chest pain/fevers.  Does report exposure to son-in-law with COVID.    CT PE abdomen/pelvis : Mild scattered pulmonary emphysematous changes. Alveolar opacities in the right middle and lower lobes. Scattered reticulonodular opacities are seen in the bilateral lungs, as described, suspicious for multifocal infection.   Continue IV ceftriaxone(day2)  Urine streptococcal antigen/Legionella negative  Sputum cultures gm + cocci in pairs/chains  Blood cultures pending  Flu RSV COVID-negative.  Acute respiratory failure with hypoxia (HCC)  Per ED report, patient was hypoxic to mid 80s on room air  Currently 94% on 4L o2  Due to pneumonia/underlying COPD  Enteritis  Complaining of watery, nonbloody diarrhea x 1 week  Was nauseous yesterday but improved this a.m.  No abdominal pain    Denies recent antibiotic use, no recent travel  CT a/p revealing liquid stool in colon possibly related to mild enteritis  Advance to soft surgical diet  Only 1 bm x 24 hrs   Encephalopathy  Per ED report, family stateed patient was confused earlier on day of admission,  however has resolved by time of presentation to ED. Family denies history of confusion/sundowning in patient  CTh negative for acute abnormality  Patient is AAO x 4  Delirium precautions  initiated  HTN (hypertension)  BP stable since admission  Continue PTA amlodipine, losartan, metoprolol  Generalized anxiety disorder  Continue Home Xanax as needed  CAD in native artery  H/o CABG with stenting x 3  EKG revealing sinus tachycardia  bpm  Patient denies CP  Continue home ASA, atorvastatin, Plavix, losartan, metoprolol and SL nitro as needed    Type 2 diabetes mellitus (HCC)  Lab Results   Component Value Date    HGBA1C 6.5 (H) 03/10/2025   Typically diet controlled  SSI AC/at bedtime ordered  Monitor Accu-Cheks closely, hypoglycemia protocol  Hyperlipidemia  Continue atorvastatin  History of stroke  Per chart review patient with history of CVA x 3  Continue PTA ASA/Plavix  COPD (chronic obstructive pulmonary disease) (HCC)  Diffuse wheezing in all lung fields noted on auscultation    Cw 40 mg IV Solu-Medrol 3 times daily  Continue duoneb q6hrs  Cw prn guaifenesin    VTE Pharmacologic Prophylaxis: VTE Score: 6 lovenox    Mobility:   Basic Mobility Inpatient Raw Score: 22  JH-HLM Goal: 7: Walk 25 feet or more  JH-HLM Achieved: 7: Walk 25 feet or more  JH-HLM Goal achieved. Continue to encourage appropriate mobility.    Patient Centered Rounds: I performed bedside rounds with nursing staff today.   Discussions with Specialists or Other Care Team Provider: cm    Education and Discussions with Family / Patient: patient    Current Length of Stay: 1 day(s)  Current Patient Status: Inpatient   Certification Statement: The patient will continue to require additional inpatient hospital stay due to needs iv abx  Discharge Plan: Anticipate discharge in 48-72 hrs to home.    Code Status: Level 1 - Full Code    Subjective   Feels much better this morning  Afebrile.  Reports shortness of breath improving.  Still with productive cough  No diarrhea.  No nausea vomiting abdominal pain    Objective :  Temp:  [97.4 °F (36.3 °C)-97.8 °F (36.6 °C)] 97.8 °F (36.6 °C)  HR:  [70-79] 72  BP: (147-160)/(72-80)  160/79  Resp:  [19-20] 20  SpO2:  [90 %-95 %] 94 %  O2 Device: Nasal cannula  Nasal Cannula O2 Flow Rate (L/min):  [3 L/min-5 L/min] 3 L/min    Body mass index is 44.57 kg/m².     Input and Output Summary (last 24 hours):     Intake/Output Summary (Last 24 hours) at 5/21/2025 1306  Last data filed at 5/21/2025 0734  Gross per 24 hour   Intake 240 ml   Output --   Net 240 ml       Physical Exam  Constitutional:       General: She is not in acute distress.     Appearance: She is obese. She is not ill-appearing.   HENT:      Mouth/Throat:      Mouth: Mucous membranes are moist.     Cardiovascular:      Rate and Rhythm: Normal rate and regular rhythm.   Pulmonary:      Effort: Pulmonary effort is normal.      Breath sounds: Rhonchi present.   Abdominal:      General: Abdomen is flat.      Palpations: Abdomen is soft.      Tenderness: There is no abdominal tenderness. There is no guarding.     Musculoskeletal:      Right lower leg: No edema.      Left lower leg: No edema.     Neurological:      Mental Status: She is alert and oriented to person, place, and time.       Lines/Drains:      Lab Results: I have reviewed the following results:   Results from last 7 days   Lab Units 05/21/25  0515   WBC Thousand/uL 30.51*   HEMOGLOBIN g/dL 13.2   HEMATOCRIT % 41.9   PLATELETS Thousands/uL 272   SEGS PCT % 90*   LYMPHO PCT % 6*   MONO PCT % 3*   EOS PCT % 0     Results from last 7 days   Lab Units 05/21/25  0515 05/20/25  0638 05/19/25  2113   SODIUM mmol/L 135   < > 133*   POTASSIUM mmol/L 4.0   < > 3.6   CHLORIDE mmol/L 98   < > 96   CO2 mmol/L 28   < > 27   BUN mg/dL 34*   < > 15   CREATININE mg/dL 0.95   < > 0.97   ANION GAP mmol/L 9   < > 10   CALCIUM mg/dL 9.3   < > 9.0   ALBUMIN g/dL  --   --  4.0   TOTAL BILIRUBIN mg/dL  --   --  0.67   ALK PHOS U/L  --   --  67   ALT U/L  --   --  16   AST U/L  --   --  29   GLUCOSE RANDOM mg/dL 139   < > 124    < > = values in this interval not displayed.         Results from last 7  days   Lab Units 05/21/25  1058 05/21/25  0728 05/20/25  2041 05/20/25  1628 05/20/25  1131 05/20/25  0725 05/20/25  0228   POC GLUCOSE mg/dl 126 163* 147* 136 167* 176* 182*         Results from last 7 days   Lab Units 05/21/25  0515 05/19/25  2322 05/19/25  2232   LACTIC ACID mmol/L  --   --  1.0   PROCALCITONIN ng/ml 0.16 0.17  --        Recent Cultures (last 7 days):   Results from last 7 days   Lab Units 05/20/25  0333 05/20/25  0327 05/19/25  2247 05/19/25  2232   BLOOD CULTURE   --   --  No Growth at 24 hrs. No Growth at 24 hrs.   SPUTUM CULTURE  3+ Growth of  --   --   --    GRAM STAIN RESULT  No polys seen*  Rare Epithelial Cells*  Rare Gram positive cocci in pairs*  Rare Gram positive rods*  --   --   --    LEGIONELLA URINARY ANTIGEN   --  Negative  --   --          Last 24 Hours Medication List:     Current Facility-Administered Medications:     acetaminophen (TYLENOL) tablet 650 mg, Q6H PRN    albuterol (PROVENTIL HFA,VENTOLIN HFA) inhaler 1 puff, Q6H PRN    ALPRAZolam (XANAX) tablet 0.25 mg, Daily PRN    amLODIPine (NORVASC) tablet 5 mg, Daily    aspirin chewable tablet 81 mg, Daily    atorvastatin (LIPITOR) tablet 80 mg, Daily With Dinner    cefTRIAXone (ROCEPHIN) 1,000 mg in dextrose 5 % 50 mL IVPB, Q24H, Last Rate: 1,000 mg (05/20/25 2339)    clopidogrel (PLAVIX) tablet 75 mg, Daily    enoxaparin (LOVENOX) subcutaneous injection 40 mg, Q12H JAVI    guaiFENesin (ROBITUSSIN) oral liquid 100 mg, Q4H PRN    insulin lispro (HumALOG/ADMELOG) 100 units/mL subcutaneous injection 1-6 Units, TID AC **AND** Fingerstick Glucose (POCT), 4x Daily AC and at bedtime    insulin lispro (HumALOG/ADMELOG) 100 units/mL subcutaneous injection 1-6 Units, HS    ipratropium-albuterol (DUO-NEB) 0.5-2.5 mg/3 mL inhalation solution 3 mL, Q6H PRN    losartan (COZAAR) tablet 50 mg, Daily    methylPREDNISolone sodium succinate (Solu-MEDROL) injection 40 mg, Q8H JAVI    metoprolol succinate (TOPROL-XL) 24 hr tablet 50 mg, Daily     montelukast (SINGULAIR) tablet 10 mg, Daily    nitroglycerin (NITROSTAT) SL tablet 0.4 mg, Q5 Min PRN    ondansetron (ZOFRAN) injection 4 mg, Q6H PRN    zolpidem (AMBIEN) tablet 10 mg, HS    Administrative Statements   Today, Patient Was Seen By: Rashid Zepeda MD      **Please Note: This note may have been constructed using a voice recognition system.**

## 2025-05-21 NOTE — CASE MANAGEMENT
Case Management Assessment & Discharge Planning Note    Patient name Cristine Snell  Location /-01 MRN [id number]  : 1955 Date 2025       Current Admission Date: 2025  Current Admission Diagnosis:Sepsis (HCC)   Patient Active Problem List    Diagnosis Date Noted    Pneumonia 2025    Sepsis (HCC) 2025    Acute respiratory failure with hypoxia ([facility]) 2025    Enteritis 2025    Encephalopathy 2025    History of stroke 2025    COPD (chronic obstructive pulmonary disease) ([facility]) 2025    HTN (hypertension) 2022    Chest pain 2022    Incarcerated incisional hernia 2022    Morbid obesity with body mass index (BMI) of 45.0 to 49.9 in adult ([facility]) 2022    History of transient ischemic attack (TIA) 10/02/2020    Hyperlipidemia 10/02/2020    CAD in native artery 2020    Restrictive lung disease 2019    Open wound 05/10/2019    Asymptomatic postmenopausal state 05/10/2019    Age-related osteoporosis without current pathological fracture 05/10/2019    Encounter for gynecological examination with abnormal finding 2018    Dermatitis 2018    Hematuria 2018    Pelvic pain 2018    Telogen effluvium 2018    Intertrigo 2018    Generalized anxiety disorder 01/10/2018    Mild persistent asthma without complication 01/10/2018    Presence of aortocoronary bypass graft 01/10/2018    Type 2 diabetes mellitus ([facility]) 01/10/2018      LOS (days): 1  Geometric Mean LOS (GMLOS) (days): 4.9  Days to GMLOS:3.3     OBJECTIVE:    Risk of Unplanned Readmission Score: 15.33     Current admission status: Inpatient       Preferred Pharmacy:   [id number] - [place] - [address]  [address]  [place] [address]  Phone: [phone] Fax: [phone]    Primary Care Provider: Jyothi Reed MD    Primary Insurance: [employer] [facility]  Secondary Insurance:      ASSESSMENT:  Active Health Care Proxies       Sinan Matthews Health Care Agent - Grandchild   Primary Phone: [phone] (Mobile)                 Advance Directives  Does patient have a Health Care POA?: Yes  Does patient have Advance Directives?: Yes  Advance Directives: Power of  for health care, Power of  for finance, Living will  Primary Contact: Patient's Granddaughter (Sinan)    Readmission Root Cause  30 Day Readmission: No    Patient Information  Admitted from:: Home  Mental Status: Alert  During Assessment patient was accompanied by: Not accompanied during assessment  Assessment information provided by:: Patient  Primary Caregiver: Self  Support Systems: Self, Family members  County of Residence: [place]  What city do you live in?: [place]  Home entry access options. Select all that apply.: No steps to enter home  Type of Current Residence: Apartment ([facility])  Floor Level: 1  Upon entering residence, is there a bedroom on the main floor (no further steps)?: Yes  Upon entering residence, is there a bathroom on the main floor (no further steps)?: Yes  Living Arrangements: Lives Alone  Is patient a ?: No    Activities of Daily Living Prior to Admission  Functional Status: Independent  Completes ADLs independently?: Yes  Ambulates independently?: Yes  Does patient use assisted devices?: No  Does patient currently own DME?: No  Does patient have a history of Outpatient Therapy (PT/OT)?: Yes  Does the patient have a history of Short-Term Rehab?: Yes  Does patient have a history of HHC?: Yes  Does patient currently have HHC?: No    Current Home Health Care  Home Health Agency Name:: Other ([facility] referral sent.)    Patient Information Continued  Income Source: Pension/assisted  Does patient have prescription coverage?: Yes  Can the patient afford their medications and any related supplies (such as glucometers or test strips)?: Yes (Uses [facility] in [place])  Does patient receive dialysis treatments?: No  Does patient have a history of substance abuse?: No  Does patient have a history of Mental Health Diagnosis?: Yes (anxiety)  Is patient receiving treatment for mental health?: Yes (Takes medications as prescribed by PCP)  Has patient received inpatient treatment related to mental health in the last 2 years?: No    Means of Transportation  Means of Transport to Appts:: Drives Self (Family helps if needed.)    DISCHARGE DETAILS:    Discharge planning discussed with:: Patient  Freedom of Choice: Yes  Comments - Freedom of Choice: CM met with patient at bedside and introduced self and role. CM maintained FOC as it pertains to discharge planning. Patient reported that she is agreeable to [facility] and denied any preferences as to which agency she wishes to use. Patient reported her correct address is [address] [place] [address]. CM agreeable to send a blanket referral and will come back with a patient choice list once available. Her granddaughter will provide transportation home at the time of discharge. Patient also requested information on [facility] [employer], and CM is agreeable to print and bring back with the patient choice list.  CM contacted family/caregiver?: No- see comments (Patient declined.)  Were Treatment Team discharge recommendations reviewed with patient/caregiver?: Yes  Did patient/caregiver verbalize understanding of patient care needs?: Yes  Were patient/caregiver advised of the risks associated with not following Treatment Team discharge recommendations?: Yes    Requested Home Health Care         Is the patient interested in HHC at discharge?: Yes  Home Health Discipline requested:: Physical Therapy, Occupational Therapy, Nursing  Home Health Agency Name:: Other ([facility] referral sent.)  Home Health Follow-Up Provider:: PCP (Dr. Elizalde)  Home Health Services Needed:: Evaluate Functional Status and Safety, Gait/ADL  Training, Strengthening/Theraputic Exercises to Improve Function, COPD Management  Homebound Criteria Met:: Requires the Assistance of Another Person for Safe Ambulation or to Leave the Home, Uses an Assist Device (i.e. cane, walker, etc)  Supporting Clincal Findings:: Fatigues Easliy in Short Distances, Limited Endurance    DME Referral Provided  Referral made for DME?: No    Other Referral/Resources/Interventions Provided:  Interventions: HHC  Referral Comments: CM sent a blanket referral for HHC via [facility] to determine who can accept patient for PT, OT, and Skilled Nursing. Responses pending in [facility].    Would you like to participate in our [facility] service program?  : Yes    Treatment Team Recommendation: Home  Discharge Destination Plan:: Home with Home Health Care  Transport at Discharge : Family

## 2025-05-21 NOTE — UTILIZATION REVIEW
Initial Clinical Review    Admission: Date/Time/Statement:   Admission Orders (From admission, onward)       Ordered        05/20/25 0011  INPATIENT ADMISSION  Once                          Orders Placed This Encounter   Procedures    INPATIENT ADMISSION     Standing Status:   Standing     Number of Occurrences:   1     Level of Care:   Med Surg [16]     Estimated length of stay:   More than 2 Midnights     Certification:   I certify that inpatient services are medically necessary for this patient for a duration of greater than two midnights. See H&P and MD Progress Notes for additional information about the patient's course of treatment.     ED Arrival Information       Expected   -    Arrival   5/19/2025 20:42    Acuity   Emergent              Means of arrival   Wheelchair    Escorted by   Family Member    Service   Hospitalist    Admission type   Emergency              Arrival complaint   AMS             Chief Complaint   Patient presents with    Flu Symptoms     Pt c/o SOB, chest pain, cough, body aches, and diarrhea x 1 week. Pt has been around covid.    Chest Pain    Shortness of Breath       Initial Presentation: 69 y.o. female to ED from home w/  PMH of HTN, JANE, CAD s/p CABG, asthma, T2DM, HLD, history of stroke who presents with SOB, cough and diarrhea x1 week.  Upon arrival to ED patient was hypoxic to mid 80s on room air.  Placed on 4l . + tachycardia , leukocytosis , pneumonia and enteritis . Given IVF bolus,  ceftriaxone , azithromycin and solumedrol in ED . Admitted IP status w/ sepsis plan f/u BC , sputum , strep /legionella , trend wbc , aspiration precautions . Clear liq diet adv as tom , consider GI consult if diarrhea does not resolve . Encephalopathy confused earlier , CTH neg , delirium precautions . HTN Continue PTA amlodipine, losartan, metoprolol . DM SSI and monitor . HLD statin . COPD IV solumedrol , nebs prn .     Anticipated Length of Stay/Certification Statement: Patient will be  admitted on an inpatient basis with an anticipated length of stay of greater than 2 midnights secondary to IV abx 2/2 sepsis pna.     Date: 5/21   Day 2: Feeling better . SOB improving . Cont w/ prod cough . O2 sat 94 % on 3 l . + rhonchi . Wbc 30.51. Cont IV solumedrol .     Date: 5/22  Day 3: Has surpassed a 2nd midnight with active treatments and services.  Cotn iv ceftriaxone . Overall much improved . Still w/ hacking cough , prod . + wheezing . Wbc improved to 23.68 from 30.51.  cont iv solumedrol . O2 sat 95 % on 2l . Anticipate discharge in 48 hrs to home.       ED Treatment-Medication Administration from 05/19/2025 2024 to 05/20/2025 0105         Date/Time Order Dose Route Action     05/19/2025 2127 guaiFENesin (MUCINEX) 12 hr tablet 600 mg 600 mg Oral Given     05/19/2025 2127 methylPREDNISolone sodium succinate (Solu-MEDROL) injection 125 mg 125 mg Intravenous Given     05/19/2025 2127 ipratropium-albuterol (DUO-NEB) 0.5-2.5 mg/3 mL inhalation solution 3 mL 3 mL Nebulization Given     05/19/2025 2211 iohexol (OMNIPAQUE) 350 MG/ML injection (MULTI-DOSE) 100 mL 100 mL Intravenous Given     05/19/2025 2258 ceftriaxone (ROCEPHIN) 1 g/50 mL in dextrose IVPB 1,000 mg Intravenous New Bag     05/19/2025 2312 azithromycin (ZITHROMAX) 500 mg in sodium chloride 0.9% 250mL IVPB 500 mg 500 mg Intravenous New Bag     05/20/2025 0027 ipratropium-albuterol (DUO-NEB) 0.5-2.5 mg/3 mL inhalation solution 3 mL 3 mL Nebulization Given            Scheduled Medications:  amLODIPine, 5 mg, Oral, Daily  aspirin, 81 mg, Oral, Daily  atorvastatin, 80 mg, Oral, Daily With Dinner  cefTRIAXone, 1,000 mg, Intravenous, Q24H  clopidogrel, 75 mg, Oral, Daily  enoxaparin, 40 mg, Subcutaneous, Q12H JAVI  insulin lispro, 1-6 Units, Subcutaneous, TID AC  insulin lispro, 1-6 Units, Subcutaneous, HS  losartan, 50 mg, Oral, Daily  methylPREDNISolone sodium succinate, 40 mg, Intravenous, Q8H JAVI  metoprolol succinate, 50 mg, Oral,  Daily  montelukast, 10 mg, Oral, Daily  zolpidem, 10 mg, Oral, HS      Continuous IV Infusions:     PRN Meds:  acetaminophen, 650 mg, Oral, Q6H PRN  albuterol, 1 puff, Inhalation, Q6H PRN  ALPRAZolam, 0.25 mg, Oral, Daily PRN  guaiFENesin, 100 mg, Oral, Q4H PRN  ipratropium-albuterol, 3 mL, Nebulization, Q6H PRN  nitroglycerin, 0.4 mg, Sublingual, Q5 Min PRN  ondansetron, 4 mg, Intravenous, Q6H PRN      ED Triage Vitals   Temperature Pulse Respirations Blood Pressure SpO2 Pain Score   05/19/25 2049 05/19/25 2049 05/19/25 2049 05/19/25 2049 05/19/25 2049 05/20/25 0138   98.5 °F (36.9 °C) 98 20 141/85 (!) 84 % No Pain     Weight (last 2 days)       Date/Time Weight    05/20/25 01:27:48 100 (220.68)    05/20/25 0100 100 (220.68)            Vital Signs (last 3 days)       Date/Time Temp Pulse Resp BP MAP (mmHg) SpO2 Calculated FIO2 (%) - Nasal Cannula Nasal Cannula O2 Flow Rate (L/min) O2 Device Patient Position - Orthostatic VS Pain    05/22/25 09:03:28 -- 76 -- 139/69 92 95 % -- -- -- -- --    05/22/25 0806 -- -- -- -- -- -- -- -- -- -- No Pain    05/22/25 07:39:48 97.2 °F (36.2 °C) 69 18 176/82 113 95 % -- -- -- Sitting --    05/22/25 0721 -- 77 17 -- -- 93 % -- -- -- -- --    05/21/25 22:11:30 -- 72 16 156/80 105 96 % -- -- -- -- --    05/21/25 1915 -- -- -- -- -- -- 28 2 L/min Nasal cannula -- No Pain    05/21/25 15:10:30 97.2 °F (36.2 °C) 80 19 150/66 94 89 % -- -- -- -- --    05/21/25 1000 -- -- -- -- -- 94 % 32 3 L/min Nasal cannula -- --    05/21/25 0912 -- -- -- -- -- -- -- -- -- -- 2    05/21/25 0800 -- -- -- -- -- 93 % -- -- -- -- --    05/21/25 07:34:07 97.8 °F (36.6 °C) 72 20 160/79 106 94 % -- -- -- -- --    05/21/25 0712 -- 70 19 -- -- 95 % -- -- -- -- --    05/21/25 00:30:44 -- 79 -- 155/80 105 93 % -- -- -- -- --    05/20/25 2026 -- -- -- -- -- 92 % 36 4 L/min Nasal cannula -- --    05/20/25 1950 -- -- -- -- -- -- 40 5 L/min Nasal cannula -- No Pain    05/20/25 15:33:16 97.4 °F (36.3 °C) 79 20 147/72  97 90 % -- -- -- -- --    05/20/25 0845 -- -- -- -- -- -- -- -- -- -- No Pain    05/20/25 07:31:42 97.8 °F (36.6 °C) 87 18 138/46 77 91 % 38 4.5 L/min Nasal cannula Lying --    05/20/25 0504 -- 90 18 -- -- 94 % -- -- -- -- --    05/20/25 0138 -- -- -- -- -- -- -- -- -- -- No Pain    05/20/25 01:27:48 98.4 °F (36.9 °C) -- -- 154/72 99 -- 38 4.5 L/min Nasal cannula -- --    05/20/25 0000 -- 90 21 155/69 99 90 % 36 4 L/min Nasal cannula Sitting --    05/19/25 2315 -- 90 20 164/77 111 91 % 36 4 L/min Nasal cannula Sitting --    05/19/25 2050 -- -- -- -- -- 92 % 36 4 L/min Nasal cannula -- --    05/19/25 2049 98.5 °F (36.9 °C) 98 20 141/85 106 84 % -- -- None (Room air) Sitting --              Pertinent Labs/Diagnostic Test Results:   Radiology:  CT head without contrast   Final Interpretation by Sang Dubois DO (05/19 2326)      No acute intracranial abnormality is seen.      Other findings as above                  Workstation performed: [id number]         CT pe study w abdomen pelvis w contrast   Final Interpretation by Sang Dubois DO (05/20 0001)      No pulmonary embolism is seen.      Mild scattered pulmonary emphysematous changes. Alveolar opacities in the right middle and lower lobes. Scattered reticulonodular opacities are seen in the bilateral lungs, as described, suspicious for multifocal infection. Correlation with the patient's    symptoms and laboratory values recommended.      Partially distended bladder. Mild circumferential bladder wall thickening noted, consider a cystitis. Correlation with the patient's symptoms, laboratory values, and urinalysis recommended.      Liquid stool in the colon, possibly related to a mild enteritis. No discrete evidence of bowel obstruction.      Small to moderate-sized fat-containing ventral hernia in the anterior right upper abdomen, associated stranding of the herniated fat, consider a component of inflammation/fat necrosis.      Coronary  atherosclerosis, status post CABG, degenerative changes of the spine, and other findings as above.            Workstation performed: [id number]         XR chest 2 views   Final Interpretation by Chuck Knapp MD (05/20 0826)      Opacities seen on subsequent CT are not well seen by radiograph.            Workstation performed: [id number]           Cardiology:  ECG 12 lead   Final Result by Cass Hawkins MD (05/20 4698)   Sinus tachycardia   Septal infarct , age undetermined   ST & T wave abnormality, consider lateral ischemia   Abnormal ECG   Confirmed by Cass Hawkins ([id number]) on 5/20/2025 12:07:28 PM        GI:  No orders to display       Results from last 7 days   Lab Units 05/19/25 2113   SARS-COV-2  Negative     Results from last 7 days   Lab Units 05/22/25 0500 05/21/25 0515 05/20/25 0638 05/19/25 2113   WBC Thousand/uL 23.68* 30.51* 13.20* 13.51*   HEMOGLOBIN g/dL 12.4 13.2 13.4 12.8   HEMATOCRIT % 38.5 41.9 41.3 41.6   PLATELETS Thousands/uL 252 272 211 227   TOTAL NEUT ABS Thousands/µL  --  27.22*  --   --          Results from last 7 days   Lab Units 05/22/25 0500 05/21/25 0515 05/20/25 0638 05/19/25 2113   SODIUM mmol/L 136 135 134* 133*   POTASSIUM mmol/L 4.4 4.0 4.2 3.6   CHLORIDE mmol/L 100 98 98 96   CO2 mmol/L 30 28 28 27   ANION GAP mmol/L 6 9 8 10   BUN mg/dL 31* 34* 18 15   CREATININE mg/dL 0.73 0.95 1.03 0.97   EGFR ml/min/1.73sq m 83 60 54 58   CALCIUM mg/dL 8.9 9.3 9.1 9.0   MAGNESIUM mg/dL  --   --  2.1  --    PHOSPHORUS mg/dL  --   --  3.4  --      Results from last 7 days   Lab Units 05/22/25 0500 05/19/25 2113   AST U/L 22 29   ALT U/L 19 16   ALK PHOS U/L 63 67   TOTAL PROTEIN g/dL 6.9 7.7   ALBUMIN g/dL 3.7 4.0   TOTAL BILIRUBIN mg/dL 0.31 0.67     Results from last 7 days   Lab Units 05/22/25  1103 05/22/25  0649 05/21/25  2211 05/21/25  1614 05/21/25  1058 05/21/25  0728 05/20/25  2041 05/20/25  1628 05/20/25  1131 05/20/25  0725 05/20/25  0228   POC GLUCOSE mg/dl 151*  140 132 120 126 163* 147* 136 167* 176* 182*     Results from last 7 days   Lab Units 05/22/25  0500 05/21/25  0515 05/20/25  0638 05/19/25 2113   GLUCOSE RANDOM mg/dL 137 139 168* 124         Results from last 7 days   Lab Units 05/20/25  0225 05/19/25  2322 05/19/25 2113   HS TNI 0HR ng/L  --   --  19   HS TNI 2HR ng/L  --  18  --    HSTNI D2 ng/L  --  -1  --    HS TNI 4HR ng/L 11  --   --    HSTNI D4 ng/L -8  --   --      Results from last 7 days   Lab Units 05/19/25 2113   D-DIMER QUANTITATIVE ug/ml FEU 0.89*       Results from last 7 days   Lab Units 05/21/25  0515 05/19/25  2322   PROCALCITONIN ng/ml 0.16 0.17     Results from last 7 days   Lab Units 05/19/25  2232   LACTIC ACID mmol/L 1.0       Results from last 7 days   Lab Units 05/19/25 2113   BNP pg/mL 43       Results from last 7 days   Lab Units 05/20/25  0327 05/19/25 2113   STREP PNEUMONIAE ANTIGEN, URINE  Negative  --    LEGIONELLA URINARY ANTIGEN  Negative  --    INFLUENZA A PCR   --  Negative   INFLUENZA B PCR   --  Negative   RSV PCR   --  Negative     Results from last 7 days   Lab Units 05/20/25  0333 05/19/25  2247 05/19/25  2232   BLOOD CULTURE   --  No Growth at 48 hrs. No Growth at 48 hrs.   SPUTUM CULTURE  3+ Growth of  --   --    GRAM STAIN RESULT  No polys seen*  Rare Epithelial Cells*  Rare Gram positive cocci in pairs*  Rare Gram positive rods*  --   --        Past Medical History:   Diagnosis Date    Angina at rest (HCC)     Arthritis .    Back problem     Coronary artery disease     CPAP (continuous positive airway pressure) dependence     Diabetes (HCC)     Gall stones     Heart attack (HCC)     High blood pressure     Myocardial infarction (HCC)     Stomach problems     Stroke ([facility])     pt states she had 4 silent strokes     Present on Admission:   HTN (hypertension)   Generalized anxiety disorder   CAD in native artery   Type 2 diabetes mellitus (HCC)   Hyperlipidemia      Admitting Diagnosis: Enteritis [K52.9]  Pneumonia  [J18.9]  COPD exacerbation (HCC) [J44.1]  Acute CHF (congestive heart failure) (HCC) [I50.9]  Sepsis (HCC) [A41.9]  AMS (altered mental status) [R41.82]  Age/Sex: 69 y.o. female    Network Utilization Review Department  ATTENTION: Please call with any questions or concerns to [phone] and carefully listen to the prompts so that you are directed to the right person. All voicemails are confidential.   For Discharge needs, contact Care Management DC Support Team at [phone] opt. 2  Send all requests for admission clinical reviews, approved or denied determinations and any other requests to dedicated fax number below belonging to the campus where the patient is receiving treatment. List of dedicated fax numbers for the Facilities:  FACILITY NAME UR FAX NUMBER   ADMISSION DENIALS (Administrative/Medical Necessity) [phone]   DISCHARGE SUPPORT TEAM (NETWORK) [phone]   PARENT CHILD HEALTH (Maternity/NICU/Pediatrics) [phone]   [facility] [phone]   [facility] [phone]   [facility] [phone]   [facility] [phone]   [facility] [phone]   [facility] [phone]   [facility] [phone]   [facility] [phone]   [facility] [phone]   Counts include 234 beds at the [facility] [phone]   [facility] [phone]   [facility] [phone]

## 2025-05-21 NOTE — RESPIRATORY THERAPY NOTE
05/21/25 0712   Respiratory Protocol   Protocol Initiated? No   Protocol Selection Respiratory   Language Barrier? No   Medical & Social History Reviewed? Yes   Diagnostic Studies Reviewed? Yes   Physical Assessment Performed? Yes   Respiratory Plan No distress/Pulmonary history   Respiratory Assessment   Assessment Type Assess only   General Appearance Awake;Alert   Respiratory Pattern Normal   Chest Assessment Chest expansion symmetrical   Bilateral Breath Sounds Diminished   Resp Comments will cont w PRN txs   O2 Device nc   Additional Assessments   Pulse 70   Respirations 19   SpO2 95 %

## 2025-05-22 PROBLEM — K52.9 ENTERITIS: Status: RESOLVED | Noted: 2025-05-20 | Resolved: 2025-05-22

## 2025-05-22 LAB
ALBUMIN SERPL BCG-MCNC: 3.7 G/DL (ref 3.5–5)
ALP SERPL-CCNC: 63 U/L (ref 34–104)
ALT SERPL W P-5'-P-CCNC: 19 U/L (ref 7–52)
ANION GAP SERPL CALCULATED.3IONS-SCNC: 6 MMOL/L (ref 4–13)
AST SERPL W P-5'-P-CCNC: 22 U/L (ref 13–39)
BACTERIA SPT RESP CULT: ABNORMAL
BILIRUB SERPL-MCNC: 0.31 MG/DL (ref 0.2–1)
BUN SERPL-MCNC: 31 MG/DL (ref 5–25)
CALCIUM SERPL-MCNC: 8.9 MG/DL (ref 8.4–10.2)
CHLORIDE SERPL-SCNC: 100 MMOL/L (ref 96–108)
CO2 SERPL-SCNC: 30 MMOL/L (ref 21–32)
CREAT SERPL-MCNC: 0.73 MG/DL (ref 0.6–1.3)
ERYTHROCYTE [DISTWIDTH] IN BLOOD BY AUTOMATED COUNT: 12.3 % (ref 11.6–15.1)
GFR SERPL CREATININE-BSD FRML MDRD: 83 ML/MIN/1.73SQ M
GLUCOSE SERPL-MCNC: 116 MG/DL (ref 65–140)
GLUCOSE SERPL-MCNC: 116 MG/DL (ref 65–140)
GLUCOSE SERPL-MCNC: 137 MG/DL (ref 65–140)
GLUCOSE SERPL-MCNC: 140 MG/DL (ref 65–140)
GLUCOSE SERPL-MCNC: 151 MG/DL (ref 65–140)
GRAM STN SPEC: ABNORMAL
HCT VFR BLD AUTO: 38.5 % (ref 34.8–46.1)
HGB BLD-MCNC: 12.4 G/DL (ref 11.5–15.4)
MCH RBC QN AUTO: 30.6 PG (ref 26.8–34.3)
MCHC RBC AUTO-ENTMCNC: 32.2 G/DL (ref 31.4–37.4)
MCV RBC AUTO: 95 FL (ref 82–98)
PLATELET # BLD AUTO: 252 THOUSANDS/UL (ref 149–390)
PMV BLD AUTO: 9.2 FL (ref 8.9–12.7)
POTASSIUM SERPL-SCNC: 4.4 MMOL/L (ref 3.5–5.3)
PROT SERPL-MCNC: 6.9 G/DL (ref 6.4–8.4)
RBC # BLD AUTO: 4.05 MILLION/UL (ref 3.81–5.12)
SODIUM SERPL-SCNC: 136 MMOL/L (ref 135–147)
WBC # BLD AUTO: 23.68 THOUSAND/UL (ref 4.31–10.16)

## 2025-05-22 PROCEDURE — 80053 COMPREHEN METABOLIC PANEL: CPT | Performed by: FAMILY MEDICINE

## 2025-05-22 PROCEDURE — 94640 AIRWAY INHALATION TREATMENT: CPT

## 2025-05-22 PROCEDURE — 85027 COMPLETE CBC AUTOMATED: CPT | Performed by: FAMILY MEDICINE

## 2025-05-22 PROCEDURE — 99232 SBSQ HOSP IP/OBS MODERATE 35: CPT | Performed by: FAMILY MEDICINE

## 2025-05-22 PROCEDURE — 82948 REAGENT STRIP/BLOOD GLUCOSE: CPT

## 2025-05-22 PROCEDURE — 94760 N-INVAS EAR/PLS OXIMETRY 1: CPT

## 2025-05-22 PROCEDURE — 94664 DEMO&/EVAL PT USE INHALER: CPT

## 2025-05-22 RX ORDER — GUAIFENESIN 100 MG/5ML
200 SOLUTION ORAL EVERY 4 HOURS PRN
Status: DISCONTINUED | OUTPATIENT
Start: 2025-05-22 | End: 2025-05-24 | Stop reason: HOSPADM

## 2025-05-22 RX ORDER — POLYETHYLENE GLYCOL 3350 17 G/17G
17 POWDER, FOR SOLUTION ORAL ONCE
Status: DISCONTINUED | OUTPATIENT
Start: 2025-05-22 | End: 2025-05-22

## 2025-05-22 RX ORDER — BENZONATATE 100 MG/1
100 CAPSULE ORAL 3 TIMES DAILY PRN
Status: DISCONTINUED | OUTPATIENT
Start: 2025-05-22 | End: 2025-05-24 | Stop reason: HOSPADM

## 2025-05-22 RX ORDER — DOCUSATE SODIUM 100 MG/1
100 CAPSULE, LIQUID FILLED ORAL 2 TIMES DAILY
Status: DISCONTINUED | OUTPATIENT
Start: 2025-05-23 | End: 2025-05-24 | Stop reason: HOSPADM

## 2025-05-22 RX ORDER — METHYLPREDNISOLONE SODIUM SUCCINATE 40 MG/ML
40 INJECTION, POWDER, LYOPHILIZED, FOR SOLUTION INTRAMUSCULAR; INTRAVENOUS EVERY 12 HOURS SCHEDULED
Status: DISCONTINUED | OUTPATIENT
Start: 2025-05-22 | End: 2025-05-24

## 2025-05-22 RX ADMIN — ACETAMINOPHEN 650 MG: 325 TABLET, FILM COATED ORAL at 23:20

## 2025-05-22 RX ADMIN — ALPRAZOLAM 0.25 MG: 0.25 TABLET ORAL at 12:42

## 2025-05-22 RX ADMIN — GUAIFENESIN 100 MG: 200 SOLUTION ORAL at 02:23

## 2025-05-22 RX ADMIN — METOPROLOL SUCCINATE 50 MG: 50 TABLET, EXTENDED RELEASE ORAL at 08:03

## 2025-05-22 RX ADMIN — CEFTRIAXONE SODIUM 1000 MG: 10 INJECTION, POWDER, FOR SOLUTION INTRAVENOUS at 23:00

## 2025-05-22 RX ADMIN — METHYLPREDNISOLONE SODIUM SUCCINATE 40 MG: 40 INJECTION, POWDER, FOR SOLUTION INTRAMUSCULAR; INTRAVENOUS at 20:27

## 2025-05-22 RX ADMIN — AMLODIPINE BESYLATE 5 MG: 5 TABLET ORAL at 08:03

## 2025-05-22 RX ADMIN — ENOXAPARIN SODIUM 40 MG: 40 INJECTION SUBCUTANEOUS at 20:27

## 2025-05-22 RX ADMIN — GUAIFENESIN 200 MG: 200 SOLUTION ORAL at 23:00

## 2025-05-22 RX ADMIN — ALBUTEROL SULFATE 1 PUFF: 90 AEROSOL, METERED RESPIRATORY (INHALATION) at 12:40

## 2025-05-22 RX ADMIN — LOSARTAN POTASSIUM 50 MG: 50 TABLET, FILM COATED ORAL at 08:03

## 2025-05-22 RX ADMIN — CLOPIDOGREL 75 MG: 75 TABLET ORAL at 08:03

## 2025-05-22 RX ADMIN — IPRATROPIUM BROMIDE AND ALBUTEROL SULFATE 3 ML: 2.5; .5 SOLUTION RESPIRATORY (INHALATION) at 15:37

## 2025-05-22 RX ADMIN — BENZONATATE 100 MG: 100 CAPSULE ORAL at 18:56

## 2025-05-22 RX ADMIN — BENZONATATE 100 MG: 100 CAPSULE ORAL at 06:45

## 2025-05-22 RX ADMIN — ASPIRIN 81 MG 81 MG: 81 TABLET ORAL at 08:03

## 2025-05-22 RX ADMIN — INSULIN LISPRO 1 UNITS: 100 INJECTION, SOLUTION INTRAVENOUS; SUBCUTANEOUS at 11:11

## 2025-05-22 RX ADMIN — ZOLPIDEM TARTRATE 10 MG: 5 TABLET, COATED ORAL at 23:00

## 2025-05-22 RX ADMIN — ALBUTEROL SULFATE 1 PUFF: 90 AEROSOL, METERED RESPIRATORY (INHALATION) at 01:09

## 2025-05-22 RX ADMIN — MONTELUKAST 10 MG: 10 TABLET, FILM COATED ORAL at 08:03

## 2025-05-22 RX ADMIN — METHYLPREDNISOLONE SODIUM SUCCINATE 40 MG: 40 INJECTION, POWDER, FOR SOLUTION INTRAMUSCULAR; INTRAVENOUS at 06:11

## 2025-05-22 RX ADMIN — GUAIFENESIN 200 MG: 200 SOLUTION ORAL at 09:27

## 2025-05-22 RX ADMIN — ENOXAPARIN SODIUM 40 MG: 40 INJECTION SUBCUTANEOUS at 08:03

## 2025-05-22 RX ADMIN — ATORVASTATIN CALCIUM 80 MG: 40 TABLET, FILM COATED ORAL at 16:10

## 2025-05-22 NOTE — CASE MANAGEMENT
Case Management Progress Note    Patient name Cristine Snell  Location /-01 MRN [id number]  : 1955 Date 2025       LOS (days): 2  Geometric Mean LOS (GMLOS) (days): 4.9  Days to GMLOS:2.4        OBJECTIVE:        Current admission status: Inpatient  Preferred Pharmacy:   [id number] - [place] - [address]  [address]  [place] [address]  Phone: [phone] Fax: [phone]    Primary Care Provider: Jyothi Reed MD    Primary Insurance: [facility]  RE[facility]  Secondary Insurance:     PROGRESS NOTE:    Per rounding with SLIM, patient has been weaned to 2L nasal cannula and is anticipated to be discharged home with [facility] in 48 hrs. Patient has accepting home care agencies available and will pick a preference prior to discharge. CM department will continue to follow patient through discharge.

## 2025-05-22 NOTE — ASSESSMENT & PLAN NOTE
Diffuse wheezing in all lung fields noted on auscultation    Cw 40 mg IV Solu-Medrol , decrease frequency to q12 hrs today  Continue duoneb q6hrs  Cw prn guaifenesin

## 2025-05-22 NOTE — PLAN OF CARE
Problem: PAIN - ADULT  Goal: Verbalizes/displays adequate comfort level or baseline comfort level  Description: Interventions:  - Encourage patient to monitor pain and request assistance  - Assess pain using appropriate pain scale  - Administer analgesics as ordered based on type and severity of pain and evaluate response  - Implement non-pharmacological measures as appropriate and evaluate response  - Consider cultural and social influences on pain and pain management  - Notify physician/advanced practitioner if interventions unsuccessful or patient reports new pain  - Educate patient/family on pain management process including their role and importance of  reporting pain   - Provide non-pharmacologic/complimentary pain relief interventions  Outcome: Progressing     Problem: INFECTION - ADULT  Goal: Absence or prevention of progression during hospitalization  Description: INTERVENTIONS:  - Assess and monitor for signs and symptoms of infection  - Monitor lab/diagnostic results  - Monitor all insertion sites, i.e. indwelling lines, tubes, and drains  - Monitor endotracheal if appropriate and nasal secretions for changes in amount and color  - [place] appropriate cooling/warming therapies per order  - Administer medications as ordered  - Instruct and encourage patient and family to use good hand hygiene technique  - Identify and instruct in appropriate isolation precautions for identified infection/condition  Outcome: Progressing  Goal: Absence of fever/infection during neutropenic period  Description: INTERVENTIONS:  - Monitor WBC  - Perform strict hand hygiene  - Limit to healthy visitors only  - No plants, dried, fresh or silk flowers with butler in patient room  Outcome: Progressing     Problem: SAFETY ADULT  Goal: Patient will remain free of falls  Description: INTERVENTIONS:  - Educate patient/family on patient safety including physical limitations  - Instruct patient to call for assistance with activity   -  Consider consulting OT/PT to assist with strengthening/mobility based on AM PAC & JH-HLM score  - Consult OT/PT to assist with strengthening/mobility   - Keep Call bell within reach  - Keep bed low and locked with side rails adjusted as appropriate  - Keep care items and personal belongings within reach  - Initiate and maintain comfort rounds  - Make Fall Risk Sign visible to staff  - Offer Toileting every 2 Hours, in advance of need  - Initiate/Maintain bed alarm  - Obtain necessary fall risk management equipment: bed alarm   - Apply yellow socks and bracelet for high fall risk patients  - Consider moving patient to room near nurses station  Outcome: Progressing  Goal: Maintain or return to baseline ADL function  Description: INTERVENTIONS:  -  Assess patient's ability to carry out ADLs; assess patient's baseline for ADL function and identify physical deficits which impact ability to perform ADLs (bathing, care of mouth/teeth, toileting, grooming, dressing, etc.)  - Assess/evaluate cause of self-care deficits   - Assess range of motion  - Assess patient's mobility; develop plan if impaired  - Assess patient's need for assistive devices and provide as appropriate  - Encourage maximum independence but intervene and supervise when necessary  - Involve family in performance of ADLs  - Assess for home care needs following discharge   - Consider OT consult to assist with ADL evaluation and planning for discharge  - Provide patient education as appropriate  - Monitor functional capacity and physical performance, use of AM PAC & JH-HLM   - Monitor gait, balance and fatigue with ambulation    Outcome: Progressing  Goal: Maintains/Returns to pre admission functional level  Description: INTERVENTIONS:  - Perform AM-PAC 6 Click Basic Mobility/ Daily Activity assessment daily.  - Set and communicate daily mobility goal to care team and patient/family/caregiver.   - Collaborate with rehabilitation services on mobility goals if  consulted  - Perform Range of Motion 3 times a day.  - Reposition patient every 2 hours.  - Dangle patient 3 times a day  - Stand patient 3 times a day  - Ambulate patient 3 times a day  - Out of bed to chair 3 times a day   - Out of bed for meals 3 times a day  - Out of bed for toileting  - Record patient progress and toleration of activity level   Outcome: Progressing     Problem: DISCHARGE PLANNING  Goal: Discharge to home or other facility with appropriate resources  Description: INTERVENTIONS:  - Identify barriers to discharge w/patient and caregiver  - Arrange for needed discharge resources and transportation as appropriate  - Identify discharge learning needs (meds, wound care, etc.)  - Arrange for interpretive services to assist at discharge as needed  - Refer to Case Management Department for coordinating discharge planning if the patient needs post-hospital services based on physician/advanced practitioner order or complex needs related to functional status, cognitive ability, or social support system  Outcome: Progressing     Problem: Knowledge Deficit  Goal: Patient/family/caregiver demonstrates understanding of disease process, treatment plan, medications, and discharge instructions  Description: Complete learning assessment and assess knowledge base.  Interventions:  - Provide teaching at level of understanding  - Provide teaching via preferred learning methods  Outcome: Progressing

## 2025-05-22 NOTE — ASSESSMENT & PLAN NOTE
C/c : Reports ongoing shortness of breath/productive cough for 1 week.  Denies any chest pain/fevers.  Does report exposure to son-in-law with COVID.    CT PE abdomen/pelvis : Mild scattered pulmonary emphysematous changes. Alveolar opacities in the right middle and lower lobes. Scattered reticulonodular opacities are seen in the bilateral lungs, as described, suspicious for multifocal infection.   Continue IV ceftriaxone(day3)  Urine streptococcal antigen/Legionella negative  Sputum cultures gm + cocci in pairs/chains  Blood cultures neg so far  Flu RSV COVID-negative.

## 2025-05-22 NOTE — RESPIRATORY THERAPY NOTE
05/22/25 0721   Respiratory Protocol   Protocol Initiated? No   Protocol Selection Respiratory   Language Barrier? No   Medical & Social History Reviewed? Yes   Diagnostic Studies Reviewed? Yes   Physical Assessment Performed? Yes   Respiratory Plan No distress/Pulmonary history   Respiratory Assessment   Assessment Type Assess only   General Appearance Sleeping   Respiratory Pattern Normal   Chest Assessment Chest expansion symmetrical   Bilateral Breath Sounds Diminished;Expiratory wheezes   O2 Device nc   Additional Assessments   Pulse 77   Respirations 17   SpO2 93 %

## 2025-05-22 NOTE — ASSESSMENT & PLAN NOTE
Complaining of watery, nonbloody diarrhea x 1 week  Was nauseous yesterday but improved this a.m.  No abdominal pain    Denies recent antibiotic use, no recent travel  CT a/p revealing liquid stool in colon possibly related to mild enteritis  Advance to soft surgical diet  Only 1 bm x 24 hrs   resolved

## 2025-05-22 NOTE — QUICK NOTE
I have personally counseled the patient on medication indication, compliance, utilization and side effects. Patient may be discharged home with albuterol inhaler

## 2025-05-22 NOTE — ASSESSMENT & PLAN NOTE
Per ED report, patient was hypoxic to mid 80s on room air  Currently 95% on 2L o2  Due to pneumonia/underlying COPD  improving

## 2025-05-22 NOTE — PROGRESS NOTES
Progress Note - Hospitalist   Name: Cristine Snell 69 y.o. female I MRN: [id number]  Unit/Bed#: -01 I Date of Admission: 5/19/2025   Date of Service: 5/22/2025 I Hospital Day: 2    Assessment & Plan  Sepsis (HCC)  Met criteria due to tachycardia/leukocytosis with evidence of pneumonia on CT  Lactate 1 with initial procalcitonin 0.17  Flu RSV COVID-negative  Follow-up blood cultures neg so far  Monitor for any fevers, monitor white blood cell count(currently 30,000)  Continue IV ceftriaxone  IVF bolus deferred as per ED patient with hx of CHF. Will defer continuous IVF hydration to avoid fluid overload     Pneumonia  C/c : Reports ongoing shortness of breath/productive cough for 1 week.  Denies any chest pain/fevers.  Does report exposure to son-in-law with COVID.    CT PE abdomen/pelvis : Mild scattered pulmonary emphysematous changes. Alveolar opacities in the right middle and lower lobes. Scattered reticulonodular opacities are seen in the bilateral lungs, as described, suspicious for multifocal infection.   Continue IV ceftriaxone(day3)  Urine streptococcal antigen/Legionella negative  Sputum cultures gm + cocci in pairs/chains  Blood cultures neg so far  Flu RSV COVID-negative.  Acute respiratory failure with hypoxia (HCC)  Per ED report, patient was hypoxic to mid 80s on room air  Currently 95% on 2L o2  Due to pneumonia/underlying COPD  improving  Enteritis (Resolved: 5/22/2025)  Complaining of watery, nonbloody diarrhea x 1 week  Was nauseous yesterday but improved this a.m.  No abdominal pain    Denies recent antibiotic use, no recent travel  CT a/p revealing liquid stool in colon possibly related to mild enteritis  Advance to soft surgical diet  Only 1 bm x 24 hrs   resolved  Encephalopathy  Per ED report, family stateed patient was confused earlier on day of admission,  however has resolved by time of presentation to ED. Family denies history of confusion/sundowning in patient  CTh negative for acute  abnormality  Patient is AAO x 4  Delirium precautions initiated  HTN (hypertension)  BP stable since admission  Continue PTA amlodipine, losartan, metoprolol  Generalized anxiety disorder  Continue Home Xanax as needed  CAD in native artery  H/o CABG with stenting x 3  EKG revealing sinus tachycardia  bpm  Patient denies CP  Continue home ASA, atorvastatin, Plavix, losartan, metoprolol and SL nitro as needed    Type 2 diabetes mellitus (HCC)  Lab Results   Component Value Date    HGBA1C 6.5 (H) 03/10/2025   Typically diet controlled  SSI AC/at bedtime ordered  Monitor Accu-Cheks closely, hypoglycemia protocol  Hyperlipidemia  Continue atorvastatin  History of stroke  Per chart review patient with history of CVA x 3  Continue PTA ASA/Plavix  COPD (chronic obstructive pulmonary disease) (HCC)  Diffuse wheezing in all lung fields noted on auscultation    Cw 40 mg IV Solu-Medrol , decrease frequency to q12 hrs today  Continue duoneb q6hrs  Cw prn guaifenesin    VTE Pharmacologic Prophylaxis: VTE Score: 6 lovenox    Mobility:   Basic Mobility Inpatient Raw Score: 22  JH-HLM Goal: 7: Walk 25 feet or more  JH-HLM Achieved: 7: Walk 25 feet or more  JH-HLM Goal achieved. Continue to encourage appropriate mobility.    Patient Centered Rounds: I performed bedside rounds with nursing staff today.   Discussions with Specialists or Other Care Team Provider: cm    Education and Discussions with Family / Patient: patient    Current Length of Stay: 2 day(s)  Current Patient Status: Inpatient   Certification Statement: The patient will continue to require additional inpatient hospital stay due to iv abx  Discharge Plan: Anticipate discharge in 48 hrs to home.    Code Status: Level 1 - Full Code    Subjective   Overall much improved  Still with hacking cough, productive  No sob  No fevers      Temp:  [97.2 °F (36.2 °C)] 97.2 °F (36.2 °C)  HR:  [69-80] 76  BP: (139-176)/(66-82) 139/69  Resp:  [16-19] 18  SpO2:  [89 %-96 %] 95  %  O2 Device: Nasal cannula  Nasal Cannula O2 Flow Rate (L/min):  [2 L/min] 2 L/min    Body mass index is 44.57 kg/m².     Input and Output Summary (last 24 hours):     Intake/Output Summary (Last 24 hours) at 5/22/2025 1102  Last data filed at 5/22/2025 0901  Gross per 24 hour   Intake 250 ml   Output --   Net 250 ml       Physical Exam  Constitutional:       General: She is not in acute distress.     Appearance: She is obese.   HENT:      Mouth/Throat:      Mouth: Mucous membranes are moist.     Cardiovascular:      Rate and Rhythm: Normal rate and regular rhythm.   Pulmonary:      Effort: Pulmonary effort is normal.      Breath sounds: Wheezing present.   Abdominal:      General: Abdomen is flat. There is no distension.      Palpations: Abdomen is soft.      Tenderness: There is no abdominal tenderness.     Musculoskeletal:      Right lower leg: No edema.      Left lower leg: No edema.     Neurological:      General: No focal deficit present.      Mental Status: She is alert and oriented to person, place, and time.         Lines/Drains:        Lab Results: I have reviewed the following results:   Results from last 7 days   Lab Units 05/22/25  0500 05/21/25  0515   WBC Thousand/uL 23.68* 30.51*   HEMOGLOBIN g/dL 12.4 13.2   HEMATOCRIT % 38.5 41.9   PLATELETS Thousands/uL 252 272   SEGS PCT %  --  90*   LYMPHO PCT %  --  6*   MONO PCT %  --  3*   EOS PCT %  --  0     Results from last 7 days   Lab Units 05/22/25  0500   SODIUM mmol/L 136   POTASSIUM mmol/L 4.4   CHLORIDE mmol/L 100   CO2 mmol/L 30   BUN mg/dL 31*   CREATININE mg/dL 0.73   ANION GAP mmol/L 6   CALCIUM mg/dL 8.9   ALBUMIN g/dL 3.7   TOTAL BILIRUBIN mg/dL 0.31   ALK PHOS U/L 63   ALT U/L 19   AST U/L 22   GLUCOSE RANDOM mg/dL 137         Results from last 7 days   Lab Units 05/22/25  0649 05/21/25  2211 05/21/25  1614 05/21/25  1058 05/21/25  0728 05/20/25  2041 05/20/25  1628 05/20/25  1131 05/20/25  0725 05/20/25  0228   POC GLUCOSE mg/dl 140 132  120 126 163* 147* 136 167* 176* 182*         Results from last 7 days   Lab Units 05/21/25  0515 05/19/25  2322 05/19/25  2232   LACTIC ACID mmol/L  --   --  1.0   PROCALCITONIN ng/ml 0.16 0.17  --        Recent Cultures (last 7 days):   Results from last 7 days   Lab Units 05/20/25  0333 05/20/25  0327 05/19/25  2247 05/19/25  2232   BLOOD CULTURE   --   --  No Growth at 48 hrs. No Growth at 48 hrs.   SPUTUM CULTURE  3+ Growth of  --   --   --    GRAM STAIN RESULT  No polys seen*  Rare Epithelial Cells*  Rare Gram positive cocci in pairs*  Rare Gram positive rods*  --   --   --    LEGIONELLA URINARY ANTIGEN   --  Negative  --   --          Last 24 Hours Medication List:     Current Facility-Administered Medications:     acetaminophen (TYLENOL) tablet 650 mg, Q6H PRN    albuterol (PROVENTIL HFA,VENTOLIN HFA) inhaler 1 puff, Q6H PRN    ALPRAZolam (XANAX) tablet 0.25 mg, Daily PRN    amLODIPine (NORVASC) tablet 5 mg, Daily    aspirin chewable tablet 81 mg, Daily    atorvastatin (LIPITOR) tablet 80 mg, Daily With Dinner    benzonatate (TESSALON PERLES) capsule 100 mg, TID PRN    cefTRIAXone (ROCEPHIN) 1,000 mg in dextrose 5 % 50 mL IVPB, Q24H, Last Rate: 1,000 mg (05/21/25 2241)    clopidogrel (PLAVIX) tablet 75 mg, Daily    enoxaparin (LOVENOX) subcutaneous injection 40 mg, Q12H JAVI    guaiFENesin (ROBITUSSIN) oral liquid 200 mg, Q4H PRN    insulin lispro (HumALOG/ADMELOG) 100 units/mL subcutaneous injection 1-6 Units, TID AC **AND** Fingerstick Glucose (POCT), 4x Daily AC and at bedtime    insulin lispro (HumALOG/ADMELOG) 100 units/mL subcutaneous injection 1-6 Units, HS    ipratropium-albuterol (DUO-NEB) 0.5-2.5 mg/3 mL inhalation solution 3 mL, Q6H PRN    losartan (COZAAR) tablet 50 mg, Daily    methylPREDNISolone sodium succinate (Solu-MEDROL) injection 40 mg, Q12H JAVI    metoprolol succinate (TOPROL-XL) 24 hr tablet 50 mg, Daily    montelukast (SINGULAIR) tablet 10 mg, Daily    nitroglycerin (NITROSTAT) SL  tablet 0.4 mg, Q5 Min PRN    ondansetron (ZOFRAN) injection 4 mg, Q6H PRN    zolpidem (AMBIEN) tablet 10 mg, HS    Administrative Statements   Today, Patient Was Seen By: Rashid Zepeda MD      **Please Note: This note may have been constructed using a voice recognition system.**

## 2025-05-22 NOTE — PLAN OF CARE
Problem: PAIN - ADULT  Goal: Verbalizes/displays adequate comfort level or baseline comfort level  Description: Interventions:  - Encourage patient to monitor pain and request assistance  - Assess pain using appropriate pain scale  - Administer analgesics as ordered based on type and severity of pain and evaluate response  - Implement non-pharmacological measures as appropriate and evaluate response  - Consider cultural and social influences on pain and pain management  - Notify physician/advanced practitioner if interventions unsuccessful or patient reports new pain  - Educate patient/family on pain management process including their role and importance of  reporting pain   - Provide non-pharmacologic/complimentary pain relief interventions  Outcome: Progressing     Problem: INFECTION - ADULT  Goal: Absence or prevention of progression during hospitalization  Description: INTERVENTIONS:  - Assess and monitor for signs and symptoms of infection  - Monitor lab/diagnostic results  - Monitor all insertion sites, i.e. indwelling lines, tubes, and drains  - Monitor endotracheal if appropriate and nasal secretions for changes in amount and color  - [place] appropriate cooling/warming therapies per order  - Administer medications as ordered  - Instruct and encourage patient and family to use good hand hygiene technique  - Identify and instruct in appropriate isolation precautions for identified infection/condition  Outcome: Progressing  Goal: Absence of fever/infection during neutropenic period  Description: INTERVENTIONS:  - Monitor WBC  - Perform strict hand hygiene  - Limit to healthy visitors only  - No plants, dried, fresh or silk flowers with butler in patient room  Outcome: Progressing     Problem: DISCHARGE PLANNING  Goal: Discharge to home or other facility with appropriate resources  Description: INTERVENTIONS:  - Identify barriers to discharge w/patient and caregiver  - Arrange for needed discharge resources  and transportation as appropriate  - Identify discharge learning needs (meds, wound care, etc.)  - Arrange for interpretive services to assist at discharge as needed  - Refer to Case Management Department for coordinating discharge planning if the patient needs post-hospital services based on physician/advanced practitioner order or complex needs related to functional status, cognitive ability, or social support system  Outcome: Progressing     Problem: Knowledge Deficit  Goal: Patient/family/caregiver demonstrates understanding of disease process, treatment plan, medications, and discharge instructions  Description: Complete learning assessment and assess knowledge base.  Interventions:  - Provide teaching at level of understanding  - Provide teaching via preferred learning methods  Outcome: Progressing

## 2025-05-22 NOTE — ASSESSMENT & PLAN NOTE
Met criteria due to tachycardia/leukocytosis with evidence of pneumonia on CT  Lactate 1 with initial procalcitonin 0.17  Flu RSV COVID-negative  Follow-up blood cultures neg so far  Monitor for any fevers, monitor white blood cell count(currently 30,000)  Continue IV ceftriaxone  IVF bolus deferred as per ED patient with hx of CHF. Will defer continuous IVF hydration to avoid fluid overload

## 2025-05-23 LAB
ANION GAP SERPL CALCULATED.3IONS-SCNC: 6 MMOL/L (ref 4–13)
BASOPHILS # BLD MANUAL: 0 THOUSAND/UL (ref 0–0.1)
BASOPHILS NFR MAR MANUAL: 0 % (ref 0–1)
BUN SERPL-MCNC: 25 MG/DL (ref 5–25)
CALCIUM SERPL-MCNC: 8.9 MG/DL (ref 8.4–10.2)
CHLORIDE SERPL-SCNC: 98 MMOL/L (ref 96–108)
CO2 SERPL-SCNC: 32 MMOL/L (ref 21–32)
CREAT SERPL-MCNC: 0.76 MG/DL (ref 0.6–1.3)
EOSINOPHIL # BLD MANUAL: 0 THOUSAND/UL (ref 0–0.4)
EOSINOPHIL NFR BLD MANUAL: 0 % (ref 0–6)
ERYTHROCYTE [DISTWIDTH] IN BLOOD BY AUTOMATED COUNT: 12.3 % (ref 11.6–15.1)
GFR SERPL CREATININE-BSD FRML MDRD: 79 ML/MIN/1.73SQ M
GLUCOSE SERPL-MCNC: 117 MG/DL (ref 65–140)
GLUCOSE SERPL-MCNC: 126 MG/DL (ref 65–140)
GLUCOSE SERPL-MCNC: 129 MG/DL (ref 65–140)
GLUCOSE SERPL-MCNC: 147 MG/DL (ref 65–140)
GLUCOSE SERPL-MCNC: 153 MG/DL (ref 65–140)
HCT VFR BLD AUTO: 38.7 % (ref 34.8–46.1)
HGB BLD-MCNC: 12.7 G/DL (ref 11.5–15.4)
LYMPHOCYTES # BLD AUTO: 0.69 THOUSAND/UL (ref 0.6–4.47)
LYMPHOCYTES # BLD AUTO: 5 % (ref 14–44)
MCH RBC QN AUTO: 31.1 PG (ref 26.8–34.3)
MCHC RBC AUTO-ENTMCNC: 32.8 G/DL (ref 31.4–37.4)
MCV RBC AUTO: 95 FL (ref 82–98)
MONOCYTES # BLD AUTO: 0.55 THOUSAND/UL (ref 0–1.22)
MONOCYTES NFR BLD: 4 % (ref 4–12)
NEUTROPHILS # BLD MANUAL: 12.59 THOUSAND/UL (ref 1.85–7.62)
NEUTS SEG NFR BLD AUTO: 91 % (ref 43–75)
PLATELET # BLD AUTO: 254 THOUSANDS/UL (ref 149–390)
PLATELET BLD QL SMEAR: ADEQUATE
PMV BLD AUTO: 8.9 FL (ref 8.9–12.7)
POTASSIUM SERPL-SCNC: 4.5 MMOL/L (ref 3.5–5.3)
RBC # BLD AUTO: 4.09 MILLION/UL (ref 3.81–5.12)
RBC MORPH BLD: NORMAL
SODIUM SERPL-SCNC: 136 MMOL/L (ref 135–147)
WBC # BLD AUTO: 13.84 THOUSAND/UL (ref 4.31–10.16)

## 2025-05-23 PROCEDURE — 94664 DEMO&/EVAL PT USE INHALER: CPT

## 2025-05-23 PROCEDURE — 80048 BASIC METABOLIC PNL TOTAL CA: CPT | Performed by: FAMILY MEDICINE

## 2025-05-23 PROCEDURE — 99233 SBSQ HOSP IP/OBS HIGH 50: CPT | Performed by: STUDENT IN AN ORGANIZED HEALTH CARE EDUCATION/TRAINING PROGRAM

## 2025-05-23 PROCEDURE — 85027 COMPLETE CBC AUTOMATED: CPT | Performed by: FAMILY MEDICINE

## 2025-05-23 PROCEDURE — 85007 BL SMEAR W/DIFF WBC COUNT: CPT | Performed by: FAMILY MEDICINE

## 2025-05-23 PROCEDURE — 82948 REAGENT STRIP/BLOOD GLUCOSE: CPT

## 2025-05-23 RX ORDER — METHOCARBAMOL 500 MG/1
500 TABLET, FILM COATED ORAL EVERY 6 HOURS PRN
Status: DISCONTINUED | OUTPATIENT
Start: 2025-05-23 | End: 2025-05-24 | Stop reason: HOSPADM

## 2025-05-23 RX ORDER — HYDRALAZINE HYDROCHLORIDE 20 MG/ML
5 INJECTION INTRAMUSCULAR; INTRAVENOUS ONCE
Status: COMPLETED | OUTPATIENT
Start: 2025-05-23 | End: 2025-05-23

## 2025-05-23 RX ADMIN — ATORVASTATIN CALCIUM 80 MG: 40 TABLET, FILM COATED ORAL at 17:04

## 2025-05-23 RX ADMIN — GUAIFENESIN 200 MG: 200 SOLUTION ORAL at 03:28

## 2025-05-23 RX ADMIN — BENZONATATE 100 MG: 100 CAPSULE ORAL at 03:28

## 2025-05-23 RX ADMIN — LOSARTAN POTASSIUM 50 MG: 50 TABLET, FILM COATED ORAL at 08:28

## 2025-05-23 RX ADMIN — GUAIFENESIN 200 MG: 200 SOLUTION ORAL at 13:17

## 2025-05-23 RX ADMIN — MONTELUKAST 10 MG: 10 TABLET, FILM COATED ORAL at 08:28

## 2025-05-23 RX ADMIN — AMLODIPINE BESYLATE 5 MG: 5 TABLET ORAL at 08:28

## 2025-05-23 RX ADMIN — GUAIFENESIN 200 MG: 200 SOLUTION ORAL at 20:15

## 2025-05-23 RX ADMIN — CLOPIDOGREL 75 MG: 75 TABLET ORAL at 08:28

## 2025-05-23 RX ADMIN — CEFTRIAXONE SODIUM 1000 MG: 10 INJECTION, POWDER, FOR SOLUTION INTRAVENOUS at 22:15

## 2025-05-23 RX ADMIN — IPRATROPIUM BROMIDE AND ALBUTEROL SULFATE 3 ML: 2.5; .5 SOLUTION RESPIRATORY (INHALATION) at 16:44

## 2025-05-23 RX ADMIN — ENOXAPARIN SODIUM 40 MG: 40 INJECTION SUBCUTANEOUS at 08:28

## 2025-05-23 RX ADMIN — BENZONATATE 100 MG: 100 CAPSULE ORAL at 13:17

## 2025-05-23 RX ADMIN — METHYLPREDNISOLONE SODIUM SUCCINATE 40 MG: 40 INJECTION, POWDER, FOR SOLUTION INTRAMUSCULAR; INTRAVENOUS at 20:15

## 2025-05-23 RX ADMIN — ACETAMINOPHEN 650 MG: 325 TABLET, FILM COATED ORAL at 20:15

## 2025-05-23 RX ADMIN — ZOLPIDEM TARTRATE 10 MG: 5 TABLET, COATED ORAL at 21:52

## 2025-05-23 RX ADMIN — ASPIRIN 81 MG 81 MG: 81 TABLET ORAL at 08:28

## 2025-05-23 RX ADMIN — ENOXAPARIN SODIUM 40 MG: 40 INJECTION SUBCUTANEOUS at 20:16

## 2025-05-23 RX ADMIN — METHOCARBAMOL 500 MG: 500 TABLET ORAL at 13:17

## 2025-05-23 RX ADMIN — DOCUSATE SODIUM 100 MG: 100 CAPSULE, LIQUID FILLED ORAL at 08:28

## 2025-05-23 RX ADMIN — METHYLPREDNISOLONE SODIUM SUCCINATE 40 MG: 40 INJECTION, POWDER, FOR SOLUTION INTRAMUSCULAR; INTRAVENOUS at 08:07

## 2025-05-23 RX ADMIN — HYDRALAZINE HYDROCHLORIDE 5 MG: 20 INJECTION INTRAMUSCULAR; INTRAVENOUS at 22:25

## 2025-05-23 RX ADMIN — METOPROLOL SUCCINATE 50 MG: 50 TABLET, EXTENDED RELEASE ORAL at 08:28

## 2025-05-23 RX ADMIN — ALPRAZOLAM 0.25 MG: 0.25 TABLET ORAL at 13:17

## 2025-05-23 RX ADMIN — INSULIN LISPRO 1 UNITS: 100 INJECTION, SOLUTION INTRAVENOUS; SUBCUTANEOUS at 08:31

## 2025-05-23 RX ADMIN — BENZONATATE 100 MG: 100 CAPSULE ORAL at 20:15

## 2025-05-23 NOTE — RESPIRATORY THERAPY NOTE
"RT Protocol Note  Cristine Snell 69 y.o. female MRN: [id number]  Unit/Bed#: -01 Encounter: [id number]    Assessment    Principal Problem:    Sepsis (HCC)  Active Problems:    HTN (hypertension)    Pneumonia    Generalized anxiety disorder    CAD in native artery    Type 2 diabetes mellitus (HCC)    Hyperlipidemia    Acute respiratory failure with hypoxia (HCC)    Encephalopathy    History of stroke    COPD (chronic obstructive pulmonary disease) ([facility])      Home Pulmonary Medications:         Past Medical History[1]  Social History[2]    Subjective         Objective    Physical Exam:   Assessment Type: Assess only  General Appearance: Awake  Respiratory Pattern: Normal  Chest Assessment: Chest expansion symmetrical  Bilateral Breath Sounds: Diminished    Vitals:  Blood pressure (!) 178/92, pulse 72, temperature (!) 97.3 °F (36.3 °C), temperature source Oral, resp. rate 18, height 4' 11\" (1.499 m), weight 100 kg (220 lb 10.9 oz), SpO2 96%, not currently breastfeeding.          Imaging and other studies:     O2 Device: nc     Plan    Respiratory Plan: Home Bronchodilator Patient pathway        Resp Comments: pt with hx of copd, will continue with prn inhaler and neb        [1]   Past Medical History:  Diagnosis Date    Angina at rest (HCC)     Arthritis .    Back problem     Coronary artery disease     CPAP (continuous positive airway pressure) dependence     Diabetes (HCC)     Gall stones     Heart attack (HCC)     High blood pressure     Myocardial infarction (HCC)     Stomach problems     Stroke ([facility])     pt states she had 4 silent strokes   [2]   Social History  Socioeconomic History    Marital status: Single   Tobacco Use    Smoking status: Former    Smokeless tobacco: Never   Vaping Use    Vaping status: Never Used   Substance and Sexual Activity    Alcohol use: Not Currently    Drug use: No    Sexual activity: Not Currently     Birth control/protection: Surgical     Comment: MARTIR-LUIS     Social Drivers of " Health     Financial Resource Strain: Not At Risk (3/11/2025)    Received from [facility]    Financial Insecurity     In the last 12 months did you skip medications to save money?: No     In the last 12 months was there a time when you needed to see a doctor but could not because of cost?: No   Food Insecurity: Food Insecurity Present (2025)    Nursing - Inadequate Food Risk Classification     Ran Out of Food in the Last Year: Sometimes true   Transportation Needs: Unmet Transportation Needs (2025)    Nursing - Transportation Risk Classification     Lack of Transportation: Yes   Stress: Stress Concern Present (2024)    Received from [facility]    [place] [place] [employer] - Occupational Stress Questionnaire     Feeling of Stress : To some extent   Social Connections: Socially Integrated (3/11/2025)    Received from [facility]    Social Connection     Do you often feel lonely?: No   Intimate Partner Violence: Unknown (2025)    Nursing IPS     Physically Hurt by Someone: No     Hurt or Threatened by Someone: No   Housing Stability: Unknown (2025)    Nursing: Inadequate Housing Risk Classification     Unable to Pay for Housing in the Last Year: No     Has Housin

## 2025-05-23 NOTE — PLAN OF CARE
Problem: PAIN - ADULT  Goal: Verbalizes/displays adequate comfort level or baseline comfort level  Description: Interventions:  - Encourage patient to monitor pain and request assistance  - Assess pain using appropriate pain scale  - Administer analgesics as ordered based on type and severity of pain and evaluate response  - Implement non-pharmacological measures as appropriate and evaluate response  - Consider cultural and social influences on pain and pain management  - Notify physician/advanced practitioner if interventions unsuccessful or patient reports new pain  - Educate patient/family on pain management process including their role and importance of  reporting pain   - Provide non-pharmacologic/complimentary pain relief interventions  Outcome: Progressing     Problem: INFECTION - ADULT  Goal: Absence or prevention of progression during hospitalization  Description: INTERVENTIONS:  - Assess and monitor for signs and symptoms of infection  - Monitor lab/diagnostic results  - Monitor all insertion sites, i.e. indwelling lines, tubes, and drains  - Monitor endotracheal if appropriate and nasal secretions for changes in amount and color  - [place] appropriate cooling/warming therapies per order  - Administer medications as ordered  - Instruct and encourage patient and family to use good hand hygiene technique  - Identify and instruct in appropriate isolation precautions for identified infection/condition  Outcome: Progressing  Goal: Absence of fever/infection during neutropenic period  Description: INTERVENTIONS:  - Monitor WBC  - Perform strict hand hygiene  - Limit to healthy visitors only  - No plants, dried, fresh or silk flowers with butler in patient room  Outcome: Progressing     Problem: Knowledge Deficit  Goal: Patient/family/caregiver demonstrates understanding of disease process, treatment plan, medications, and discharge instructions  Description: Complete learning assessment and assess knowledge  base.  Interventions:  - Provide teaching at level of understanding  - Provide teaching via preferred learning methods  Outcome: Progressing

## 2025-05-23 NOTE — ASSESSMENT & PLAN NOTE
Diffuse wheezing in all lung fields noted on auscultation    Cw 40 mg IV Solu-Medrol , decreased frequency to q12 hrs   Continue duoneb q6hrs  Cw prn guaifenesin

## 2025-05-23 NOTE — ASSESSMENT & PLAN NOTE
C/c : Reports ongoing shortness of breath/productive cough for 1 week.  Denies any chest pain/fevers.  Does report exposure to son-in-law with COVID.    CT PE abdomen/pelvis : Mild scattered pulmonary emphysematous changes. Alveolar opacities in the right middle and lower lobes. Scattered reticulonodular opacities are seen in the bilateral lungs, as described, suspicious for multifocal infection.   Continue IV ceftriaxone (day 5)  Urine streptococcal antigen/Legionella negative  Sputum cultures gm + cocci in pairs/chains  Blood cultures neg so far  Flu RSV COVID-negative.

## 2025-05-23 NOTE — NURSING NOTE
Patient requesting new IV site due to being right handed and not wanting her IV in that hand. Attempted new IV with no success, charge RN attempted with no success. Informed patient we could reach out to vascular access to see if they were available to attempt, nobody is currently signed into role on Epic chat.

## 2025-05-23 NOTE — PROGRESS NOTES
Patient:    MRN:  [id number]    Aidin Request ID:  [id number]    Level of care reserved:  Home Health Agency    Partner Reserved:  [facility] [place] [address] [phone]    Clinical needs requested:    Geography searched:  [id number]    Start of Service:    Request sent:  2:39pm EDT on 5/21/2025 by Sylvie Clifford    Partner reserved:  2:22pm EDT on 5/23/2025 by Sylvie Clifford    Choice list shared:  1:28pm EDT on 5/23/2025 by Sylvie Clifford

## 2025-05-23 NOTE — CASE MANAGEMENT
Case Management Discharge Planning Note    Patient name Cristine Snell  Location /-01 MRN [id number]  : 1955 Date 2025       Current Admission Date: 2025  Current Admission Diagnosis:Sepsis (HCC)   Patient Active Problem List    Diagnosis Date Noted    Pneumonia 2025    Sepsis (HCC) 2025    Acute respiratory failure with hypoxia ([facility]) 2025    Encephalopathy 2025    History of stroke 2025    COPD (chronic obstructive pulmonary disease) ([facility]) 2025    HTN (hypertension) 2022    Chest pain 2022    Incarcerated incisional hernia 2022    Morbid obesity with body mass index (BMI) of 45.0 to 49.9 in adult ([facility]) 2022    History of transient ischemic attack (TIA) 10/02/2020    Hyperlipidemia 10/02/2020    CAD in native artery 2020    Restrictive lung disease 2019    Open wound 05/10/2019    Asymptomatic postmenopausal state 05/10/2019    Age-related osteoporosis without current pathological fracture 05/10/2019    Encounter for gynecological examination with abnormal finding 2018    Dermatitis 2018    Hematuria 2018    Pelvic pain 2018    Telogen effluvium 2018    Intertrigo 2018    Generalized anxiety disorder 01/10/2018    Mild persistent asthma without complication 01/10/2018    Presence of aortocoronary bypass graft 01/10/2018    Type 2 diabetes mellitus ([facility]) 01/10/2018      LOS (days): 3  Geometric Mean LOS (GMLOS) (days): 4.9  Days to GMLOS:1.3     OBJECTIVE:  Risk of Unplanned Readmission Score: 13         Current admission status: Inpatient   Preferred Pharmacy:   [id number] - [place] - [address]  [address]  [place] [address]  Phone: [phone] Fax: [phone]    Primary Care Provider: Jyothi Reed MD    Primary Insurance: [employer] [facility]  Secondary Insurance:     DISCHARGE DETAILS:    Discharge planning discussed  with:: Patient and Patient's Granddaughter  Freedom of Choice: Yes  Comments - Freedom of Choice: CM met with patient at bedside and reviewed DC plan. CM reviewed list of accepting home care agencies, and patient prefers [facility] due to past history with them. CM also gave her the phone number for [facility] and encouraged her to call them if still interested in this resource. Her granddaughter confirmed that she will be able to  any new medications from [facility] before 1 tomorrow afternoon and then provide transportation home. Patient and granddaughtter denied any additional questions or concerns at this time.  CM contacted family/caregiver?: Yes  Were Treatment Team discharge recommendations reviewed with patient/caregiver?: Yes  Did patient/caregiver verbalize understanding of patient care needs?: Yes  Were patient/caregiver advised of the risks associated with not following Treatment Team discharge recommendations?: Yes    Contacts  Patient Contacts: Sinan  Relationship to Patient:: Family  Contact Method: In Person  Reason/Outcome: Continuity of Care, Discharge Planning    Requested Home Health Care         Is the patient interested in HHC at discharge?: Yes  Home Health Discipline requested:: Physical Therapy, Occupational Therapy, Nursing  [facility] External Referral Reason (only applicable if external HHA name selected): Patient has established relationship with provider  Home Health Follow-Up Provider:: PCP  Home Health Services Needed:: Evaluate Functional Status and Safety, Gait/ADL Training, Strengthening/Theraputic Exercises to Improve Function, Heart Failure Management, COPD Management  Homebound Criteria Met:: Requires the Assistance of Another Person for Safe Ambulation or to Leave the Home, Uses an Assist Device (i.e. cane, walker, etc)  Supporting Clincal Findings:: Fatigues Easliy in Short Distances, Limited Endurance    DME Referral Provided  Referral made for  DME?: No    Other Referral/Resources/Interventions Provided:  Interventions: [facility]  Referral Comments: CM reserved [facility] in [place] and updated patient's AVS to reflect the same.    Would you like to participate in our [facility] service program?  : No - Declined    Treatment Team Recommendation: Home  Discharge Destination Plan:: Home with Home Health Care  Transport at Discharge : Family    IMM Given (Date):: 05/23/25  IMM Given to:: Patient (CM reviewed IMM With patient at bedside. She reported understanding of these rights and denied any questions or concerns at this time. Copy given. Original copy placed in medical records.)

## 2025-05-23 NOTE — PROGRESS NOTES
Progress Note - Hospitalist   Name: Cristine Snell 69 y.o. female I MRN: [id number]  Unit/Bed#: -01 I Date of Admission: 5/19/2025   Date of Service: 5/23/2025 I Hospital Day: 3    Assessment & Plan  Sepsis (HCC)  Met criteria due to tachycardia/leukocytosis with evidence of pneumonia on CT  Lactate 1 with initial procalcitonin 0.17  Flu RSV COVID-negative  Follow-up blood cultures neg so far  Monitor for any fevers, monitor white blood cell count(currently 30,000)  Continue IV ceftriaxone  IVF bolus deferred as per ED patient with hx of CHF. Will defer continuous IVF hydration to avoid fluid overload     Pneumonia  C/c : Reports ongoing shortness of breath/productive cough for 1 week.  Denies any chest pain/fevers.  Does report exposure to son-in-law with COVID.    CT PE abdomen/pelvis : Mild scattered pulmonary emphysematous changes. Alveolar opacities in the right middle and lower lobes. Scattered reticulonodular opacities are seen in the bilateral lungs, as described, suspicious for multifocal infection.   Continue IV ceftriaxone (day 5)  Urine streptococcal antigen/Legionella negative  Sputum cultures gm + cocci in pairs/chains  Blood cultures neg so far  Flu RSV COVID-negative.  Acute respiratory failure with hypoxia (HCC)  Per ED report, patient was hypoxic to mid 80s on room air  Currently 95% on 2L o2  Due to pneumonia/underlying COPD  improving  Encephalopathy  Per ED report, family stateed patient was confused earlier on day of admission,  however has resolved by time of presentation to ED. Family denies history of confusion/sundowning in patient  CTh negative for acute abnormality  Patient is AAO x 4  Delirium precautions initiated  HTN (hypertension)  BP stable since admission  Continue PTA amlodipine, losartan, metoprolol  Generalized anxiety disorder  Continue Home Xanax as needed  CAD in native artery  H/o CABG with stenting x 3  EKG revealing sinus tachycardia  bpm  Patient denies  CP  Continue home ASA, atorvastatin, Plavix, losartan, metoprolol and SL nitro as needed    Type 2 diabetes mellitus (HCC)  Lab Results   Component Value Date    HGBA1C 6.5 (H) 03/10/2025   Typically diet controlled  SSI AC/at bedtime ordered  Monitor Accu-Cheks closely, hypoglycemia protocol  Hyperlipidemia  Continue atorvastatin  History of stroke  Per chart review patient with history of CVA x 3  Continue PTA ASA/Plavix  COPD (chronic obstructive pulmonary disease) (HCC)  Diffuse wheezing in all lung fields noted on auscultation    Cw 40 mg IV Solu-Medrol , decreased frequency to q12 hrs   Continue duoneb q6hrs  Cw prn guaifenesin    VTE Pharmacologic Prophylaxis: VTE Score: 6 Moderate Risk (Score 3-4) - Pharmacological DVT Prophylaxis Ordered: enoxaparin (Lovenox).    Mobility:   Basic Mobility Inpatient Raw Score: 22  JH-HLM Goal: 7: Walk 25 feet or more  JH-HLM Achieved: 7: Walk 25 feet or more  JH-HLM Goal achieved. Continue to encourage appropriate mobility.    Patient Centered Rounds: I performed bedside rounds with nursing staff today.   Discussions with Specialists or Other Care Team Provider: none    Education and Discussions with Family / Patient: Patient declined call to .     Current Length of Stay: 3 day(s)  Current Patient Status: Inpatient   Certification Statement: The patient will continue to require additional inpatient hospital stay due to IV abx for PNA  Discharge Plan: Anticipate discharge in 24-48 hrs to home with home services.    Code Status: Level 1 - Full Code    Subjective   Patient reported slow improvement.  Still having some shortness of breath.  No chest pain.    Objective :  Temp:  [97.3 °F (36.3 °C)-97.8 °F (36.6 °C)] 97.3 °F (36.3 °C)  HR:  [68-81] 72  BP: (146-194)/(61-92) 178/92  Resp:  [18] 18  SpO2:  [93 %-96 %] 96 %  O2 Device: Nasal cannula  Nasal Cannula O2 Flow Rate (L/min):  [2 L/min] 2 L/min    Body mass index is 44.57 kg/m².     Input and Output Summary  (last 24 hours):     Intake/Output Summary (Last 24 hours) at 5/23/2025 1359  Last data filed at 5/23/2025 0733  Gross per 24 hour   Intake 720 ml   Output --   Net 720 ml       Physical Exam  No acute distress  Nonlabored respirations  Regular rate and rhythm  Nontender nondistended abdomen  Alert and oriented x 3    Lines/Drains:              Lab Results: I have reviewed the following results:   Results from last 7 days   Lab Units 05/23/25  0514 05/22/25  0500 05/21/25  0515   WBC Thousand/uL 13.84*   < > 30.51*   HEMOGLOBIN g/dL 12.7   < > 13.2   HEMATOCRIT % 38.7   < > 41.9   PLATELETS Thousands/uL 254   < > 272   SEGS PCT %  --   --  90*   LYMPHO PCT % 5*  --  6*   MONO PCT % 4  --  3*   EOS PCT % 0  --  0    < > = values in this interval not displayed.     Results from last 7 days   Lab Units 05/23/25  0514 05/22/25  0500   SODIUM mmol/L 136 136   POTASSIUM mmol/L 4.5 4.4   CHLORIDE mmol/L 98 100   CO2 mmol/L 32 30   BUN mg/dL 25 31*   CREATININE mg/dL 0.76 0.73   ANION GAP mmol/L 6 6   CALCIUM mg/dL 8.9 8.9   ALBUMIN g/dL  --  3.7   TOTAL BILIRUBIN mg/dL  --  0.31   ALK PHOS U/L  --  63   ALT U/L  --  19   AST U/L  --  22   GLUCOSE RANDOM mg/dL 147* 137         Results from last 7 days   Lab Units 05/23/25  1115 05/23/25  0550 05/22/25  2100 05/22/25  1600 05/22/25  1103 05/22/25  0649 05/21/25  2211 05/21/25  1614 05/21/25  1058 05/21/25  0728 05/20/25  2041 05/20/25  1628   POC GLUCOSE mg/dl 126 153* 116 116 151* 140 132 120 126 163* 147* 136         Results from last 7 days   Lab Units 05/21/25  0515 05/19/25  2322 05/19/25  2232   LACTIC ACID mmol/L  --   --  1.0   PROCALCITONIN ng/ml 0.16 0.17  --        Recent Cultures (last 7 days):   Results from last 7 days   Lab Units 05/20/25  0333 05/20/25  0327 05/19/25  2247 05/19/25  2232   BLOOD CULTURE   --   --  No Growth at 72 hrs. No Growth at 72 hrs.   SPUTUM CULTURE  3+ Growth of  --   --   --    GRAM STAIN RESULT  No polys seen*  Rare Epithelial  Cells*  Rare Gram positive cocci in pairs*  Rare Gram positive rods*  --   --   --    LEGIONELLA URINARY ANTIGEN   --  Negative  --   --        Imaging Results Review: No pertinent imaging studies reviewed.  Other Study Results Review: No additional pertinent studies reviewed.    Last 24 Hours Medication List:     Current Facility-Administered Medications:     acetaminophen (TYLENOL) tablet 650 mg, Q6H PRN    albuterol (PROVENTIL HFA,VENTOLIN HFA) inhaler 1 puff, Q6H PRN    ALPRAZolam (XANAX) tablet 0.25 mg, Daily PRN    amLODIPine (NORVASC) tablet 5 mg, Daily    aspirin chewable tablet 81 mg, Daily    atorvastatin (LIPITOR) tablet 80 mg, Daily With Dinner    benzonatate (TESSALON PERLES) capsule 100 mg, TID PRN    cefTRIAXone (ROCEPHIN) 1,000 mg in dextrose 5 % 50 mL IVPB, Q24H, Last Rate: 1,000 mg (05/22/25 2300)    clopidogrel (PLAVIX) tablet 75 mg, Daily    docusate sodium (COLACE) capsule 100 mg, BID    enoxaparin (LOVENOX) subcutaneous injection 40 mg, Q12H JAVI    guaiFENesin (ROBITUSSIN) oral liquid 200 mg, Q4H PRN    insulin lispro (HumALOG/ADMELOG) 100 units/mL subcutaneous injection 1-6 Units, TID AC **AND** Fingerstick Glucose (POCT), 4x Daily AC and at bedtime    insulin lispro (HumALOG/ADMELOG) 100 units/mL subcutaneous injection 1-6 Units, HS    ipratropium-albuterol (DUO-NEB) 0.5-2.5 mg/3 mL inhalation solution 3 mL, Q6H PRN    losartan (COZAAR) tablet 50 mg, Daily    methocarbamol (ROBAXIN) tablet 500 mg, Q6H PRN    methylPREDNISolone sodium succinate (Solu-MEDROL) injection 40 mg, Q12H JAVI    metoprolol succinate (TOPROL-XL) 24 hr tablet 50 mg, Daily    montelukast (SINGULAIR) tablet 10 mg, Daily    nitroglycerin (NITROSTAT) SL tablet 0.4 mg, Q5 Min PRN    ondansetron (ZOFRAN) injection 4 mg, Q6H PRN    zolpidem (AMBIEN) tablet 10 mg, HS    Administrative Statements   Today, Patient Was Seen By: Fazal Gomez MD      **Please Note: This note may have been constructed using a voice recognition  system.**

## 2025-05-24 VITALS
WEIGHT: 220.68 LBS | OXYGEN SATURATION: 94 % | DIASTOLIC BLOOD PRESSURE: 86 MMHG | RESPIRATION RATE: 17 BRPM | HEIGHT: 59 IN | TEMPERATURE: 98.5 F | SYSTOLIC BLOOD PRESSURE: 181 MMHG | BODY MASS INDEX: 44.49 KG/M2 | HEART RATE: 72 BPM

## 2025-05-24 LAB
ANION GAP SERPL CALCULATED.3IONS-SCNC: 9 MMOL/L (ref 4–13)
BASOPHILS # BLD AUTO: 0.03 THOUSANDS/ÂΜL (ref 0–0.1)
BASOPHILS NFR BLD AUTO: 0 % (ref 0–1)
BUN SERPL-MCNC: 20 MG/DL (ref 5–25)
CALCIUM SERPL-MCNC: 9 MG/DL (ref 8.4–10.2)
CHLORIDE SERPL-SCNC: 99 MMOL/L (ref 96–108)
CO2 SERPL-SCNC: 28 MMOL/L (ref 21–32)
CREAT SERPL-MCNC: 0.62 MG/DL (ref 0.6–1.3)
EOSINOPHIL # BLD AUTO: 0.06 THOUSAND/ÂΜL (ref 0–0.61)
EOSINOPHIL NFR BLD AUTO: 1 % (ref 0–6)
ERYTHROCYTE [DISTWIDTH] IN BLOOD BY AUTOMATED COUNT: 12.3 % (ref 11.6–15.1)
GFR SERPL CREATININE-BSD FRML MDRD: 91 ML/MIN/1.73SQ M
GLUCOSE SERPL-MCNC: 129 MG/DL (ref 65–140)
GLUCOSE SERPL-MCNC: 150 MG/DL (ref 65–140)
GLUCOSE SERPL-MCNC: 151 MG/DL (ref 65–140)
HCT VFR BLD AUTO: 41.4 % (ref 34.8–46.1)
HGB BLD-MCNC: 13.6 G/DL (ref 11.5–15.4)
IMM GRANULOCYTES # BLD AUTO: 0.2 THOUSAND/UL (ref 0–0.2)
IMM GRANULOCYTES NFR BLD AUTO: 2 % (ref 0–2)
LYMPHOCYTES # BLD AUTO: 1.35 THOUSANDS/ÂΜL (ref 0.6–4.47)
LYMPHOCYTES NFR BLD AUTO: 11 % (ref 14–44)
MAGNESIUM SERPL-MCNC: 2.3 MG/DL (ref 1.9–2.7)
MCH RBC QN AUTO: 30.5 PG (ref 26.8–34.3)
MCHC RBC AUTO-ENTMCNC: 32.9 G/DL (ref 31.4–37.4)
MCV RBC AUTO: 93 FL (ref 82–98)
MONOCYTES # BLD AUTO: 0.86 THOUSAND/ÂΜL (ref 0.17–1.22)
MONOCYTES NFR BLD AUTO: 7 % (ref 4–12)
NEUTROPHILS # BLD AUTO: 10.05 THOUSANDS/ÂΜL (ref 1.85–7.62)
NEUTS SEG NFR BLD AUTO: 79 % (ref 43–75)
NRBC BLD AUTO-RTO: 0 /100 WBCS
PLATELET # BLD AUTO: 290 THOUSANDS/UL (ref 149–390)
PMV BLD AUTO: 9 FL (ref 8.9–12.7)
POTASSIUM SERPL-SCNC: 4.1 MMOL/L (ref 3.5–5.3)
RBC # BLD AUTO: 4.46 MILLION/UL (ref 3.81–5.12)
SODIUM SERPL-SCNC: 136 MMOL/L (ref 135–147)
WBC # BLD AUTO: 12.55 THOUSAND/UL (ref 4.31–10.16)

## 2025-05-24 PROCEDURE — 82948 REAGENT STRIP/BLOOD GLUCOSE: CPT

## 2025-05-24 PROCEDURE — 99239 HOSP IP/OBS DSCHRG MGMT >30: CPT | Performed by: STUDENT IN AN ORGANIZED HEALTH CARE EDUCATION/TRAINING PROGRAM

## 2025-05-24 PROCEDURE — 80048 BASIC METABOLIC PNL TOTAL CA: CPT | Performed by: STUDENT IN AN ORGANIZED HEALTH CARE EDUCATION/TRAINING PROGRAM

## 2025-05-24 PROCEDURE — 94664 DEMO&/EVAL PT USE INHALER: CPT

## 2025-05-24 PROCEDURE — 83735 ASSAY OF MAGNESIUM: CPT | Performed by: STUDENT IN AN ORGANIZED HEALTH CARE EDUCATION/TRAINING PROGRAM

## 2025-05-24 PROCEDURE — 94640 AIRWAY INHALATION TREATMENT: CPT

## 2025-05-24 PROCEDURE — 94760 N-INVAS EAR/PLS OXIMETRY 1: CPT

## 2025-05-24 PROCEDURE — 85025 COMPLETE CBC W/AUTO DIFF WBC: CPT | Performed by: STUDENT IN AN ORGANIZED HEALTH CARE EDUCATION/TRAINING PROGRAM

## 2025-05-24 RX ORDER — METHOCARBAMOL 500 MG/1
500 TABLET, FILM COATED ORAL EVERY 6 HOURS PRN
Qty: 20 TABLET | Refills: 0 | Status: SHIPPED | OUTPATIENT
Start: 2025-05-24

## 2025-05-24 RX ORDER — CEFPODOXIME PROXETIL 200 MG/1
200 TABLET, FILM COATED ORAL 2 TIMES DAILY
Qty: 6 TABLET | Refills: 0 | Status: SHIPPED | OUTPATIENT
Start: 2025-05-24 | End: 2025-05-27

## 2025-05-24 RX ADMIN — GUAIFENESIN 200 MG: 200 SOLUTION ORAL at 06:01

## 2025-05-24 RX ADMIN — MONTELUKAST 10 MG: 10 TABLET, FILM COATED ORAL at 08:40

## 2025-05-24 RX ADMIN — BENZONATATE 100 MG: 100 CAPSULE ORAL at 06:01

## 2025-05-24 RX ADMIN — IPRATROPIUM BROMIDE AND ALBUTEROL SULFATE 3 ML: 2.5; .5 SOLUTION RESPIRATORY (INHALATION) at 08:01

## 2025-05-24 RX ADMIN — METHOCARBAMOL 500 MG: 500 TABLET ORAL at 12:02

## 2025-05-24 RX ADMIN — ENOXAPARIN SODIUM 40 MG: 40 INJECTION SUBCUTANEOUS at 08:40

## 2025-05-24 RX ADMIN — LOSARTAN POTASSIUM 50 MG: 50 TABLET, FILM COATED ORAL at 08:40

## 2025-05-24 RX ADMIN — DOCUSATE SODIUM 100 MG: 100 CAPSULE, LIQUID FILLED ORAL at 08:40

## 2025-05-24 RX ADMIN — ALPRAZOLAM 0.25 MG: 0.25 TABLET ORAL at 12:02

## 2025-05-24 RX ADMIN — ACETAMINOPHEN 650 MG: 325 TABLET, FILM COATED ORAL at 06:01

## 2025-05-24 RX ADMIN — ASPIRIN 81 MG 81 MG: 81 TABLET ORAL at 08:40

## 2025-05-24 RX ADMIN — INSULIN LISPRO 1 UNITS: 100 INJECTION, SOLUTION INTRAVENOUS; SUBCUTANEOUS at 08:41

## 2025-05-24 RX ADMIN — CLOPIDOGREL 75 MG: 75 TABLET ORAL at 08:40

## 2025-05-24 RX ADMIN — METOPROLOL SUCCINATE 50 MG: 50 TABLET, EXTENDED RELEASE ORAL at 08:40

## 2025-05-24 RX ADMIN — METHYLPREDNISOLONE SODIUM SUCCINATE 40 MG: 40 INJECTION, POWDER, FOR SOLUTION INTRAMUSCULAR; INTRAVENOUS at 08:33

## 2025-05-24 RX ADMIN — AMLODIPINE BESYLATE 5 MG: 5 TABLET ORAL at 08:40

## 2025-05-24 NOTE — DISCHARGE INSTR - AVS FIRST PAGE
Please complete 3 more days of oral antibiotic for treatment of pneumonia.   Please follow up with your PCP and Pulmonologist

## 2025-05-24 NOTE — ASSESSMENT & PLAN NOTE
Diffuse wheezing in all lung fields noted on auscultation    Compelted IV Solu-Medrol   Continue duoneb q6hrs  Cw prn guaifenesin

## 2025-05-24 NOTE — ASSESSMENT & PLAN NOTE
Per ED report, family stateed patient was confused earlier on day of admission,  however has resolved by time of presentation to ED. Family denies history of confusion/sundowning in patient  CTh negative for acute abnormality  Patient is AAO x 4 throughout her admission  Delirium precautions initiated

## 2025-05-24 NOTE — PLAN OF CARE
Problem: PAIN - ADULT  Goal: Verbalizes/displays adequate comfort level or baseline comfort level  Description: Interventions:  - Encourage patient to monitor pain and request assistance  - Assess pain using appropriate pain scale  - Administer analgesics as ordered based on type and severity of pain and evaluate response  - Implement non-pharmacological measures as appropriate and evaluate response  - Consider cultural and social influences on pain and pain management  - Notify physician/advanced practitioner if interventions unsuccessful or patient reports new pain  - Educate patient/family on pain management process including their role and importance of  reporting pain   - Provide non-pharmacologic/complimentary pain relief interventions  Outcome: Progressing     Problem: INFECTION - ADULT  Goal: Absence or prevention of progression during hospitalization  Description: INTERVENTIONS:  - Assess and monitor for signs and symptoms of infection  - Monitor lab/diagnostic results  - Monitor all insertion sites, i.e. indwelling lines, tubes, and drains  - Monitor endotracheal if appropriate and nasal secretions for changes in amount and color  - [place] appropriate cooling/warming therapies per order  - Administer medications as ordered  - Instruct and encourage patient and family to use good hand hygiene technique  - Identify and instruct in appropriate isolation precautions for identified infection/condition  Outcome: Progressing  Goal: Absence of fever/infection during neutropenic period  Description: INTERVENTIONS:  - Monitor WBC  - Perform strict hand hygiene  - Limit to healthy visitors only  - No plants, dried, fresh or silk flowers with butler in patient room  Outcome: Progressing       Problem: DISCHARGE PLANNING  Goal: Discharge to home or other facility with appropriate resources  Description: INTERVENTIONS:  - Identify barriers to discharge w/patient and caregiver  - Arrange for needed discharge resources  and transportation as appropriate  - Identify discharge learning needs (meds, wound care, etc.)  - Arrange for interpretive services to assist at discharge as needed  - Refer to Case Management Department for coordinating discharge planning if the patient needs post-hospital services based on physician/advanced practitioner order or complex needs related to functional status, cognitive ability, or social support system  Outcome: Progressing     Problem: Knowledge Deficit  Goal: Patient/family/caregiver demonstrates understanding of disease process, treatment plan, medications, and discharge instructions  Description: Complete learning assessment and assess knowledge base.  Interventions:  - Provide teaching at level of understanding  - Provide teaching via preferred learning methods  Outcome: Progressing   Goal: Maintain or return to baseline ADL function  Description: INTERVENTIONS:  -  Assess patient's ability to carry out ADLs; assess patient's baseline for ADL function and identify physical deficits which impact ability to perform ADLs (bathing, care of mouth/teeth, toileting, grooming, dressing, etc.)  - Assess/evaluate cause of self-care deficits   - Assess range of motion  - Assess patient's mobility; develop plan if impaired  - Assess patient's need for assistive devices and provide as appropriate  - Encourage maximum independence but intervene and supervise when necessary  - Involve family in performance of ADLs  - Assess for home care needs following discharge   - Consider OT consult to assist with ADL evaluation and planning for discharge  - Provide patient education as appropriate  - Monitor functional capacity and physical performance, use of AM PAC & JH-HLM   - Monitor gait, balance and fatigue with ambulation    Outcome: Progressing

## 2025-05-24 NOTE — DISCHARGE SUMMARY
Discharge Summary - Hospitalist   Name: Cristine Snell 69 y.o. female I MRN: [id number]  Unit/Bed#: -01 I Date of Admission: 5/19/2025   Date of Service: 5/24/2025 I Hospital Day: 4     Assessment & Plan  Sepsis (HCC)  Resolved     Pneumonia  C/c : Reports ongoing shortness of breath/productive cough for 1 week.  Denies any chest pain/fevers.  Does report exposure to son-in-law with COVID.    CT PE abdomen/pelvis : Mild scattered pulmonary emphysematous changes. Alveolar opacities in the right middle and lower lobes. Scattered reticulonodular opacities are seen in the bilateral lungs, as described, suspicious for multifocal infection.   Received IV ceftriaxone in the hospital and will be discharged on oral cefpodoxime to complete course  Urine streptococcal antigen/Legionella negative  Sputum cultures gm + cocci in pairs/chains  Blood cultures neg so far  Flu RSV COVID-negative.  Complete abx course with oral cefpodoxime   Acute respiratory failure with hypoxia (HCC)  Per ED report, patient was hypoxic to mid 80s on room air  Currently 95% on 2L o2  Due to pneumonia/underlying COPD  improving  Encephalopathy  Per ED report, family stateed patient was confused earlier on day of admission,  however has resolved by time of presentation to ED. Family denies history of confusion/sundowning in patient  CTh negative for acute abnormality  Patient is AAO x 4 throughout her admission  Delirium precautions initiated  HTN (hypertension)  BP stable since admission  Continue PTA amlodipine, losartan, metoprolol  Generalized anxiety disorder  Continue Home Xanax as needed  CAD in native artery  H/o CABG with stenting x 3  EKG revealing sinus tachycardia  bpm  Patient denies CP  Continue home ASA, atorvastatin, Plavix, losartan, metoprolol and SL nitro as needed    Type 2 diabetes mellitus (HCC)  Lab Results   Component Value Date    HGBA1C 6.5 (H) 03/10/2025   Typically diet controlled  SSI AC/at bedtime  ordered  Monitor Accu-Cheks closely, hypoglycemia protocol  Hyperlipidemia  Continue atorvastatin  History of stroke  Per chart review patient with history of CVA x 3  Continue PTA ASA/Plavix  COPD (chronic obstructive pulmonary disease) (HCC)  Diffuse wheezing in all lung fields noted on auscultation    Compelted IV Solu-Medrol   Continue duoneb q6hrs  Cw prn guaifenesin     Medical Problems       Resolved Problems  Date Reviewed: 6/30/2024          Resolved    Enteritis 5/22/2025     Resolved by  Rashid Zepeda MD        Discharging Physician / Practitioner: Fazal Gomez MD  PCP: Jyothi Reed MD  Admission Date:   Admission Orders (From admission, onward)       Ordered        05/20/25 0011  INPATIENT ADMISSION  Once                          Discharge Date: 05/24/25    Medication Changes for Discharge & Rationale:   Oral abx  See after visit summary for reconciled discharge medications provided to patient and/or family.     Consultations During Hospital Stay:  none    Procedures Performed:   none    Significant Findings / Test Results:   CT -multifocal pneumonia      Hospital Course:   Cristine Snell is a 69 y.o. female patient who originally presented to the hospital on 5/19/2025 due to altered mental status and shortness of breath.  She was found to have multifocal pneumonia on CT scan and started on IV antibiotics.  She was also treated with IV steroids for possible COPD exacerbation.  Patient was improved with these above therapies and discharged on oral antibiotics to complete her course.  She will follow-up with her outpatient pulmonologist and PCP.    No notes on file      Please see above list of diagnoses and related plan for additional information.     Discharge Day Visit / Exam:   * Please refer to separate progress note for these details *    Discussion with Family: Patient declined call to .     Discharge instructions/Information to patient and family:   See after visit summary for  information provided to patient and family.      Provisions for Follow-Up Care:  See after visit summary for information related to follow-up care and any pertinent home health orders.      Mobility at time of Discharge:   Basic Mobility Inpatient Raw Score: 22  JH-HLM Goal: 7: Walk 25 feet or more  JH-HLM Achieved: 7: Walk 25 feet or more  HLM Goal achieved. Continue to encourage appropriate mobility.     Disposition:   Home    Planned Readmission: none    Administrative Statements   Discharge Statement:  I have spent a total time of 40+ minutes in caring for this patient on the day of the visit/encounter. >30 minutes of time was spent on: Instructions for management, Risk factor reductions, Impressions, and Reviewing / ordering tests, medicine, procedures  .    **Please Note: This note may have been constructed using a voice recognition system**

## 2025-05-24 NOTE — RESPIRATORY THERAPY NOTE
05/24/25 0717   Respiratory Protocol   Protocol Initiated? No   Protocol Selection Respiratory   Language Barrier? No   Medical & Social History Reviewed? Yes   Diagnostic Studies Reviewed? Yes   Physical Assessment Performed? Yes   Respiratory Plan Home Bronchodilator Patient pathway   Respiratory Assessment   Assessment Type Assess only   General Appearance Sleeping   Respiratory Pattern Normal   Chest Assessment Chest expansion symmetrical   Bilateral Breath Sounds Diminished   Resp Comments will cont w txs as PRN as per pt req   O2 Device nc   Additional Assessments   Pulse 72   Respirations 17   SpO2 94 %

## 2025-05-24 NOTE — ASSESSMENT & PLAN NOTE
C/c : Reports ongoing shortness of breath/productive cough for 1 week.  Denies any chest pain/fevers.  Does report exposure to son-in-law with COVID.    CT PE abdomen/pelvis : Mild scattered pulmonary emphysematous changes. Alveolar opacities in the right middle and lower lobes. Scattered reticulonodular opacities are seen in the bilateral lungs, as described, suspicious for multifocal infection.   Received IV ceftriaxone in the hospital and will be discharged on oral cefpodoxime to complete course  Urine streptococcal antigen/Legionella negative  Sputum cultures gm + cocci in pairs/chains  Blood cultures neg so far  Flu RSV COVID-negative.  Complete abx course with oral cefpodoxime

## 2025-05-24 NOTE — PLAN OF CARE
Problem: PAIN - ADULT  Goal: Verbalizes/displays adequate comfort level or baseline comfort level  Description: Interventions:  - Encourage patient to monitor pain and request assistance  - Assess pain using appropriate pain scale  - Administer analgesics as ordered based on type and severity of pain and evaluate response  - Implement non-pharmacological measures as appropriate and evaluate response  - Consider cultural and social influences on pain and pain management  - Notify physician/advanced practitioner if interventions unsuccessful or patient reports new pain  - Educate patient/family on pain management process including their role and importance of  reporting pain   - Provide non-pharmacologic/complimentary pain relief interventions  Outcome: Progressing     Problem: INFECTION - ADULT  Goal: Absence or prevention of progression during hospitalization  Description: INTERVENTIONS:  - Assess and monitor for signs and symptoms of infection  - Monitor lab/diagnostic results  - Monitor all insertion sites, i.e. indwelling lines, tubes, and drains  - Monitor endotracheal if appropriate and nasal secretions for changes in amount and color  - [place] appropriate cooling/warming therapies per order  - Administer medications as ordered  - Instruct and encourage patient and family to use good hand hygiene technique  - Identify and instruct in appropriate isolation precautions for identified infection/condition  Outcome: Progressing     Problem: SAFETY ADULT  Goal: Patient will remain free of falls  Description: INTERVENTIONS:  - Educate patient/family on patient safety including physical limitations  - Instruct patient to call for assistance with activity   - Consider consulting OT/PT to assist with strengthening/mobility based on AM PAC & JH-HLM score  - Consult OT/PT to assist with strengthening/mobility   - Keep Call bell within reach  - Keep bed low and locked with side rails adjusted as appropriate  - Keep  care items and personal belongings within reach  - Initiate and maintain comfort rounds  - Make Fall Risk Sign visible to staff  - Offer Toileting every 2 Hours, in advance of need  - Initiate/Maintain alarm  - Obtain necessary fall risk management equipment:   - Apply yellow socks and bracelet for high fall risk patients  - Consider moving patient to room near nurses station  Outcome: Progressing    Continue with plan of care treating patient PNA with IV abx, assess, monitor and treat blood pressure and patient glucose.

## 2025-05-25 LAB
BACTERIA BLD CULT: NORMAL
BACTERIA BLD CULT: NORMAL

## 2025-06-19 PROBLEM — J18.9 PNEUMONIA: Status: RESOLVED | Noted: 2025-05-20 | Resolved: 2025-06-19

## 2025-06-19 PROBLEM — A41.9 SEPSIS (HCC): Status: RESOLVED | Noted: 2025-05-20 | Resolved: 2025-06-19

## 2025-08-13 ENCOUNTER — TELEPHONE (OUTPATIENT)
Dept: OTHER | Facility: OTHER | Age: 70
End: 2025-08-13